# Patient Record
Sex: MALE | Race: WHITE | NOT HISPANIC OR LATINO
[De-identification: names, ages, dates, MRNs, and addresses within clinical notes are randomized per-mention and may not be internally consistent; named-entity substitution may affect disease eponyms.]

---

## 2017-01-13 ENCOUNTER — APPOINTMENT (OUTPATIENT)
Dept: PULMONOLOGY | Facility: CLINIC | Age: 77
End: 2017-01-13

## 2017-01-13 VITALS — HEART RATE: 56 BPM | OXYGEN SATURATION: 98 % | SYSTOLIC BLOOD PRESSURE: 135 MMHG | DIASTOLIC BLOOD PRESSURE: 80 MMHG

## 2017-01-13 VITALS — SYSTOLIC BLOOD PRESSURE: 110 MMHG | DIASTOLIC BLOOD PRESSURE: 80 MMHG

## 2017-07-08 ENCOUNTER — RX RENEWAL (OUTPATIENT)
Age: 77
End: 2017-07-08

## 2017-08-01 ENCOUNTER — RX RENEWAL (OUTPATIENT)
Age: 77
End: 2017-08-01

## 2017-09-05 DIAGNOSIS — L02.32 FURUNCLE OF BUTTOCK: ICD-10-CM

## 2017-09-08 ENCOUNTER — APPOINTMENT (OUTPATIENT)
Dept: PULMONOLOGY | Facility: CLINIC | Age: 77
End: 2017-09-08
Payer: MEDICARE

## 2017-09-08 VITALS
RESPIRATION RATE: 17 BRPM | HEART RATE: 62 BPM | HEIGHT: 68 IN | BODY MASS INDEX: 22.73 KG/M2 | OXYGEN SATURATION: 99 % | WEIGHT: 150 LBS | SYSTOLIC BLOOD PRESSURE: 110 MMHG | DIASTOLIC BLOOD PRESSURE: 80 MMHG

## 2017-09-08 PROCEDURE — 94010 BREATHING CAPACITY TEST: CPT

## 2017-09-08 PROCEDURE — 99214 OFFICE O/P EST MOD 30 MIN: CPT | Mod: 25

## 2017-09-08 RX ORDER — CLINDAMYCIN HYDROCHLORIDE 150 MG/1
150 CAPSULE ORAL EVERY 6 HOURS
Qty: 8 | Refills: 0 | Status: DISCONTINUED | COMMUNITY
Start: 2017-09-05 | End: 2017-09-08

## 2017-09-08 RX ORDER — CLINDAMYCIN HYDROCHLORIDE 300 MG/1
300 CAPSULE ORAL
Qty: 14 | Refills: 0 | Status: DISCONTINUED | COMMUNITY
Start: 2017-05-31

## 2017-09-08 RX ORDER — PHENAZOPYRIDINE HYDROCHLORIDE 200 MG/1
200 TABLET ORAL
Qty: 14 | Refills: 0 | Status: DISCONTINUED | COMMUNITY
Start: 2017-05-31

## 2018-01-01 ENCOUNTER — RX RENEWAL (OUTPATIENT)
Age: 78
End: 2018-01-01

## 2018-03-08 ENCOUNTER — APPOINTMENT (OUTPATIENT)
Dept: PULMONOLOGY | Facility: CLINIC | Age: 78
End: 2018-03-08
Payer: MEDICARE

## 2018-03-08 VITALS
HEART RATE: 68 BPM | WEIGHT: 150 LBS | BODY MASS INDEX: 22.73 KG/M2 | DIASTOLIC BLOOD PRESSURE: 80 MMHG | HEIGHT: 68 IN | SYSTOLIC BLOOD PRESSURE: 120 MMHG | OXYGEN SATURATION: 96 % | RESPIRATION RATE: 17 BRPM

## 2018-03-08 PROCEDURE — 94010 BREATHING CAPACITY TEST: CPT

## 2018-03-08 PROCEDURE — 99214 OFFICE O/P EST MOD 30 MIN: CPT | Mod: 25

## 2018-05-04 ENCOUNTER — RX RENEWAL (OUTPATIENT)
Age: 78
End: 2018-05-04

## 2018-10-25 ENCOUNTER — NON-APPOINTMENT (OUTPATIENT)
Age: 78
End: 2018-10-25

## 2018-10-25 ENCOUNTER — APPOINTMENT (OUTPATIENT)
Dept: PULMONOLOGY | Facility: CLINIC | Age: 78
End: 2018-10-25
Payer: MEDICARE

## 2018-10-25 VITALS
BODY MASS INDEX: 22.73 KG/M2 | OXYGEN SATURATION: 96 % | SYSTOLIC BLOOD PRESSURE: 130 MMHG | HEIGHT: 68 IN | DIASTOLIC BLOOD PRESSURE: 78 MMHG | RESPIRATION RATE: 17 BRPM | WEIGHT: 150 LBS | HEART RATE: 76 BPM

## 2018-10-25 PROCEDURE — 94010 BREATHING CAPACITY TEST: CPT

## 2018-10-25 PROCEDURE — 99214 OFFICE O/P EST MOD 30 MIN: CPT | Mod: 25

## 2018-10-25 NOTE — REVIEW OF SYSTEMS
[Negative] : Sleep Disorder [Ear Disturbance] : ear disturbance [As Noted in HPI] : as noted in HPI [Cough] : cough

## 2018-10-25 NOTE — HISTORY OF PRESENT ILLNESS
[FreeTextEntry1] : Mr. Davenport is a 78 year old male presenting to the office for a follow up visit for LPRD, hoarseness, SOHAIL, postnasal drip and RADs. His chief complaint is a residual cough. \par -He notes he started having chest congestion about 3 weeks ago\par -He states the illness began with "his eyelids sweating" which usually happens before he falls ill\par -He states he was sick with a URI with chills and fever and it lasted a few days, but was suffering with a residual cough\par -He states he has been having ringing in his ear since the illness and it has not resolved\par -He states he has never had the ringing in his ear in the past\par -He notes he has been following up with his GI specialist Dr. Huffman and is now taking Prilosec and Zantac\par -He denies palpitations, shortness of breath, orthopnea, nasal congestion, rhinitis, heartburn, reflux,

## 2018-10-25 NOTE — ADDENDUM
[FreeTextEntry1] : Documented by Concepcion Hernández acting as a scribe for Dr. Turner Sanchez on 10/25/2018.\par \par All medical record entries made by the Scribe were at my, Dr. Turner Sanchez's, direction and personally dictated by me on 10//25/2018. I have reviewed the chart and agree that the record accurately reflects my personal performance of the history, physical exam, assessment and plan. I have also personally directed, reviewed, and agree with the discharge instructions.

## 2018-10-25 NOTE — REASON FOR VISIT
[Follow-Up] : a follow-up visit [FreeTextEntry1] : LPRD, hoarseness, SOHAIL, postnasal drip and RADs

## 2018-10-25 NOTE — PROCEDURE
[FreeTextEntry1] : PFT-spi reveals normal flows with FEV1 of  2.75 ,which is   100% of predicted, normal flow volume loop

## 2018-10-25 NOTE — ASSESSMENT
[FreeTextEntry1] : Mr. Davenport has a history of RADs (flair), allergies, GERD and OSAS.  He is recovering from a URI and currently struggling with tinnitus.\par \par problem 1: RADs/mild intermittent asthma\par -quiet at this point in time\par -no therapy at this point time \par -Asthma is  believed to be caused by inherited (genetic) and environmental factor, but its exact cause is unknown. Asthma may be triggered by allergens, lung infections, or irritants in the air. Asthma triggers are different for each person \par -Inhaler technique reviewed as well as oral hygiene techniques reviewed with patient. Avoidance of cold air, extremes of temperature, rescue inhaler should be used before exercise. Order of medication reviewed with patient. Recommended use of a cool mist humidifier in the bedroom. \par \par problem 2: allergic rhinitis \par -continue to use Qnasl 1 sniff each nostril BID\par -continue to use Claritin OTC\par -Environmental measures for allergies were encouraged including mattress and pillow cover, air purifier, and environmental controls.\par \par problem 3: GERD\par -continue to use Prilosec 20 mg before breakfast, transition to 10mg per day \par -continue to use Zantac 300 mg before bed\par -continue to follow up with Dr. Huffman\par -Things to avoid including overeating, spicy foods, tight clothing, eating within three hours of bed, this list is not all inclusive. \par -For treatment of reflux, possible options discussed including diet control, H2 blockers, PPIs, as well as coating motility agents discussed as treatment options. Timing of meals and proximity of last meal to sleep were discussed. If symptoms persist, a formal gastrointestinal evaluation is needed.\par \par \par problem 4: OSAS\par -he is being recommended to use the CPAP machine  and has been compliant and tolerating it well. \par -he is recommended to use Oxy-Aid by [employer] in the interim\par -Discussed the risks/associations with coronary artery disease, atrial fibrillation, arrhythmia, memory loss, issues with concentration, stroke risk, hypertension, nocturia, chronic reflux/Terrazas’s esophagus some but not all inclusive. Treatment options discussed including CPAP/BiPAP machine, oral appliance, ProVent therapy, Oxy-Aid by [employer], new technologies, or positional sleep.\par \par problem 5: fatigue and use of supplements\par -continue baby aspirin QD\par -continue Co-Q 10 200 mg QD\par -continue L- Carnitine 500 mg BID\par \par problem 6: dysphonia\par -recommended to take Dolce Voce\par \par problem 7: tinnitus\par -recommended to try Quietus\par \par problem 8: health maintenance \par -refused 2018 flu shot \par -recommended strep pneumonia vaccines: Prevnar-13 vaccine (up to date), followed by Pneumo vaccine 23 one year following\par -recommended early intervention for URIs\par -recommended regular osteoporosis evaluations\par -recommended early dermatological evaluations\par -recommended after the age of 50 to the age of 70, colonoscopy every 5 years \par \par \par F/U in 6 months\par He is encouraged to call with any changes, concerns, or questions

## 2018-11-11 ENCOUNTER — EMERGENCY (EMERGENCY)
Facility: HOSPITAL | Age: 78
LOS: 1 days | Discharge: HOME | End: 2018-11-11
Attending: EMERGENCY MEDICINE
Payer: MEDICARE

## 2018-11-11 VITALS
RESPIRATION RATE: 20 BRPM | OXYGEN SATURATION: 98 % | HEIGHT: 68 IN | HEART RATE: 89 BPM | SYSTOLIC BLOOD PRESSURE: 166 MMHG | WEIGHT: 154.98 LBS | DIASTOLIC BLOOD PRESSURE: 95 MMHG | TEMPERATURE: 98 F

## 2018-11-11 PROCEDURE — 99282 EMERGENCY DEPT VISIT SF MDM: CPT

## 2018-11-11 PROCEDURE — 99282 EMERGENCY DEPT VISIT SF MDM: CPT | Mod: GC

## 2018-11-11 NOTE — ED PROVIDER NOTE - MEDICAL DECISION MAKING DETAILS
ddx cerumen impaction vs TM perf. Ear exam shows little cerumen in the L and pt notes the clogged feeling of the L ear has dissipated after the ear exam. Pt to f/u with ENT.

## 2018-11-11 NOTE — ED PROVIDER NOTE - ATTENDING CONTRIBUTION TO CARE
Attending MD Fair: I personally have seen and examined this patient.  Resident note reviewed and agree on plan of care and except where noted.  See below for details.    78M with no reported PMH/PSH presents to the ED with L ear fullness.  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 Dima Bingham.  Reports audiology test was "good" and hearing was "symmetric".  Reports that since that visit has had a marked decreased in tinnitus and now reports it is "almost nothing".  Reports that he came in today because he felt a L ear fullness, reports yesterday felt something "drop" in his ear and thought it was a piece of cerumen left over from his cerumen removal at the time of his ENT visit.  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  Denies fevers, chills, dizziness, weakness. Denies change in vision, headache, neck pain.  Denies facial pain, nasal congestion.  Denies chest pain, shortness of breath, palpitations. Denies abdominal pain, nausea, vomiting, diarrhea, blood in stools. On exam, NAD, CN 2-12 grossly intact, head NCAT, PERRL, TMs clear bilaterally, entire TM visualized, no fluid or blood noted behind the TM, no bulging, minimal cerumen (almost none) bilaterally, FROM at neck, no cervical or auricular LAD, no tenderness to midline palpation, no stepoffs along length of spine, lungs CTAB with good inspiratory effort, +S1S2, no m/r/g, abdomen soft, NT, moving all extremities, ambulating with steady gait; A/P: 78M with L ear fullness, reports felt mildly improved after ear exam here, will follow up with ENT in 24-48hrs. Follow up instructions given, importance of follow up emphasized, return to ED parameters reviewed and patient verbalized understanding.  All questions answered, all concerns addressed.

## 2018-11-11 NOTE — ED PROVIDER NOTE - NS ED ROS FT
ROS: denies HA, weakness, dizziness, fevers/chills, nausea/vomiting, chest pain, SOB, diaphoresis, abdominal pain, diarrhea, joint pain, neuro deficits, dysuria/hematuria, rash    +L ear clogged

## 2018-11-11 NOTE — ED PROVIDER NOTE - PHYSICAL EXAMINATION
Gen: Well appearing, NAD  Head: NCAT  HEENT: PERRL, MMM, normal conjunctiva, anicteric, neck supple  Lung: CTAB, no adventitious sounds  CV: RRR, no murmurs, rubs or gallops  Abd: soft, NTND, no rebound or guarding, no CVAT  MSK: No edema, no visible deformities  Neuro: No focal neurologic deficits. CN II-XII grossly intact. 5/5 strength and normal sensation in all extremities.  Skin: Warm and dry, no evidence of rash  Psych: normal mood and affect

## 2018-11-11 NOTE — ED PROVIDER NOTE - PROGRESS NOTE DETAILS
ear exam - very little cerumen in the L ear, less than the R. Great visualization of TM on both sides. will dc with f/u ENT

## 2018-11-11 NOTE — ED PROVIDER NOTE - OBJECTIVE STATEMENT
77yo M with hx of recurrent clogged L ear presents with L ear clogged. No discomfort but cannot hear as well as he would like to go to the convention tomorrow. denies cp/sob/abd pain/f/c/cough/facial pain.

## 2018-12-01 ENCOUNTER — RX RENEWAL (OUTPATIENT)
Age: 78
End: 2018-12-01

## 2019-01-22 ENCOUNTER — NON-APPOINTMENT (OUTPATIENT)
Age: 79
End: 2019-01-22

## 2019-01-22 ENCOUNTER — APPOINTMENT (OUTPATIENT)
Dept: CARDIOLOGY | Facility: CLINIC | Age: 79
End: 2019-01-22
Payer: MEDICARE

## 2019-01-22 VITALS
DIASTOLIC BLOOD PRESSURE: 85 MMHG | RESPIRATION RATE: 17 BRPM | WEIGHT: 155 LBS | SYSTOLIC BLOOD PRESSURE: 142 MMHG | HEART RATE: 73 BPM | HEIGHT: 68 IN | BODY MASS INDEX: 23.49 KG/M2

## 2019-01-22 PROCEDURE — 93000 ELECTROCARDIOGRAM COMPLETE: CPT

## 2019-01-22 PROCEDURE — 99204 OFFICE O/P NEW MOD 45 MIN: CPT

## 2019-01-28 ENCOUNTER — APPOINTMENT (OUTPATIENT)
Dept: CARDIOLOGY | Facility: CLINIC | Age: 79
End: 2019-01-28
Payer: MEDICARE

## 2019-01-28 VITALS
WEIGHT: 155 LBS | RESPIRATION RATE: 15 BRPM | DIASTOLIC BLOOD PRESSURE: 80 MMHG | HEIGHT: 68 IN | HEART RATE: 68 BPM | BODY MASS INDEX: 23.49 KG/M2 | SYSTOLIC BLOOD PRESSURE: 127 MMHG

## 2019-01-28 PROCEDURE — ZZZZZ: CPT

## 2019-01-28 PROCEDURE — 99213 OFFICE O/P EST LOW 20 MIN: CPT | Mod: 25

## 2019-01-28 PROCEDURE — 93015 CV STRESS TEST SUPVJ I&R: CPT

## 2019-01-28 PROCEDURE — 93306 TTE W/DOPPLER COMPLETE: CPT

## 2019-01-28 NOTE — PHYSICAL EXAM
[General Appearance - Well Developed] : well developed [Normal Appearance] : normal appearance [Well Groomed] : well groomed [General Appearance - Well Nourished] : well nourished [No Deformities] : no deformities [General Appearance - In No Acute Distress] : no acute distress [Normal Conjunctiva] : the conjunctiva exhibited no abnormalities [Normal Oral Mucosa] : normal oral mucosa [Normal Oropharynx] : normal oropharynx [Normal Jugular Venous A Waves Present] : normal jugular venous A waves present [Normal Jugular Venous V Waves Present] : normal jugular venous V waves present [No Jugular Venous Merino A Waves] : no jugular venous merino A waves [Respiration, Rhythm And Depth] : normal respiratory rhythm and effort [Exaggerated Use Of Accessory Muscles For Inspiration] : no accessory muscle use [Auscultation Breath Sounds / Voice Sounds] : lungs were clear to auscultation bilaterally [Bowel Sounds] : normal bowel sounds [Abdomen Soft] : soft [Abnormal Walk] : normal gait [Nail Clubbing] : no clubbing of the fingernails [Cyanosis, Localized] : no localized cyanosis [Skin Color & Pigmentation] : normal skin color and pigmentation [Skin Turgor] : normal skin turgor [] : no rash [Oriented To Time, Place, And Person] : oriented to person, place, and time [Impaired Insight] : insight and judgment were intact [Affect] : the affect was normal [Mood] : the mood was normal [5th Left ICS - MCL] : palpated at the 5th LICS in the midclavicular line [Normal] : normal [No Precordial Heave] : no precordial heave was noted [Normal Rate] : normal [Rhythm Regular] : regular [Normal S1] : normal S1 [Normal S2] : normal S2 [I] : a grade 1 [2+] : left 2+ [No Pitting Edema] : no pitting edema present

## 2019-01-28 NOTE — DISCUSSION/SUMMARY
[FreeTextEntry1] : This is a 78-year-old male with past medical history significant for hypertension, murmur, who comes in for cardiac follow up evaluation. \par The patient had a normal exercise stress test January 28, 2019. He will have an echo Doppler \par examination to evaluate his murmur, left ventricular function, chamber size, and rule out hypertrophy.\par He reports that his palpitations have subsided with his cessation and coffee intake. He will continue to remain off coffee and maintain adequate hydration. He does exercise on a regular basis.\par \par The patient is clear from a cardiac standpoint for colonoscopy pending medical clearance.\par EKG done January 22, 2019 demonstrated normal sinus rhythm rate 73 beats per minute and is otherwise unremarkable. She reports having a normal exercise stress test approximately 6 years ago. I've asked him to get me a copy of those results.\par \par The patient understands that aerobic exercises must be increased to 40 minutes 4 times per week. A detailed discussion of lifestyle modification was done today. The patient has a good understanding of the diagnosis, and treatment plan. Lifestyle modification was also outlined.

## 2019-01-28 NOTE — REASON FOR VISIT
[Follow-Up - Clinic] : a clinic follow-up of [Dyspnea] : dyspnea [Hypertension] : hypertension [Palpitations] : palpitations [FreeTextEntry2] : clearance for colonscopy 01/31/19 [FreeTextEntry1] : murmur

## 2019-01-29 ENCOUNTER — TRANSCRIPTION ENCOUNTER (OUTPATIENT)
Age: 79
End: 2019-01-29

## 2019-02-04 ENCOUNTER — MEDICATION RENEWAL (OUTPATIENT)
Age: 79
End: 2019-02-04

## 2019-04-08 ENCOUNTER — TRANSCRIPTION ENCOUNTER (OUTPATIENT)
Age: 79
End: 2019-04-08

## 2019-04-25 ENCOUNTER — APPOINTMENT (OUTPATIENT)
Dept: PULMONOLOGY | Facility: CLINIC | Age: 79
End: 2019-04-25
Payer: MEDICARE

## 2019-04-25 ENCOUNTER — NON-APPOINTMENT (OUTPATIENT)
Age: 79
End: 2019-04-25

## 2019-04-25 VITALS
HEART RATE: 68 BPM | HEIGHT: 68 IN | WEIGHT: 155 LBS | SYSTOLIC BLOOD PRESSURE: 110 MMHG | BODY MASS INDEX: 23.49 KG/M2 | RESPIRATION RATE: 17 BRPM | OXYGEN SATURATION: 96 % | DIASTOLIC BLOOD PRESSURE: 80 MMHG

## 2019-04-25 PROCEDURE — 94010 BREATHING CAPACITY TEST: CPT

## 2019-04-25 PROCEDURE — 99214 OFFICE O/P EST MOD 30 MIN: CPT | Mod: 25

## 2019-04-25 PROCEDURE — 95012 NITRIC OXIDE EXP GAS DETER: CPT

## 2019-04-25 NOTE — REVIEW OF SYSTEMS
[Negative] : Sleep Disorder [As Noted in HPI] : as noted in HPI [Postnasal Drip] : postnasal drip [FreeTextEntry2] : globus, tinnitus

## 2019-04-25 NOTE — PHYSICAL EXAM
[Well Groomed] : well groomed [General Appearance - Well Developed] : well developed [Normal Appearance] : normal appearance [General Appearance - In No Acute Distress] : no acute distress [General Appearance - Well Nourished] : well nourished [No Deformities] : no deformities [Eyelids - No Xanthelasma] : the eyelids demonstrated no xanthelasmas [Normal Conjunctiva] : the conjunctiva exhibited no abnormalities [II] : II [Normal Oropharynx] : normal oropharynx [Neck Appearance] : the appearance of the neck was normal [Neck Cervical Mass (___cm)] : no neck mass was observed [Jugular Venous Distention Increased] : there was no jugular-venous distention [Thyroid Diffuse Enlargement] : the thyroid was not enlarged [Heart Rate And Rhythm] : heart rate and rhythm were normal [Thyroid Nodule] : there were no palpable thyroid nodules [Heart Sounds] : normal S1 and S2 [Murmurs] : no murmurs present [Respiration, Rhythm And Depth] : normal respiratory rhythm and effort [Exaggerated Use Of Accessory Muscles For Inspiration] : no accessory muscle use [Auscultation Breath Sounds / Voice Sounds] : lungs were clear to auscultation bilaterally [Abdomen Soft] : soft [Abdomen Mass (___ Cm)] : no abdominal mass palpated [Abdomen Tenderness] : non-tender [Gait - Sufficient For Exercise Testing] : the gait was sufficient for exercise testing [Abnormal Walk] : normal gait [Petechial Hemorrhages (___cm)] : no petechial hemorrhages [Nail Clubbing] : no clubbing of the fingernails [Cyanosis, Localized] : no localized cyanosis [Skin Color & Pigmentation] : normal skin color and pigmentation [No Venous Stasis] : no venous stasis [] : no rash [Skin Lesions] : no skin lesions [No Skin Ulcers] : no skin ulcer [No Xanthoma] : no  xanthoma was observed [Sensation] : the sensory exam was normal to light touch and pinprick [Deep Tendon Reflexes (DTR)] : deep tendon reflexes were 2+ and symmetric [No Focal Deficits] : no focal deficits [Affect] : the affect was normal [Impaired Insight] : insight and judgment were intact [Oriented To Time, Place, And Person] : oriented to person, place, and time [FreeTextEntry1] : right lower extremity wound

## 2019-04-25 NOTE — ADDENDUM
[FreeTextEntry1] : Documented by Hossein Carter acting as a scribe for Dr. Turner Sanchez on 04/25/2019 \par \par All medical record entries made by the Scribe were at my, Dr. Turner Sanchez's, direction and personally dictated by me on 04/25/2019  . I have reviewed the chart and agree that the record accurately reflects my personal performance of the history, physical exam, procedure, assessment and plan. I have also personally directed, reviewed, and agree with the discharge instructions. \par \par

## 2019-04-25 NOTE — HISTORY OF PRESENT ILLNESS
[FreeTextEntry1] : Mr. Davenport is a 78 year old male presenting to the office for a follow up visit for LPRD, hoarseness, SOHAIL, postnasal drip and RADs. His chief complaint is post nasal drip / tinnitus \par -he reports he is feeling well generally\par -he notes his energy levels are good overall, rating an 8 out of 10\par -he states he is sleeping well\par -he reports he exercises with a  a few times a week\par -he notes he has a consistent and persistent post nasal drip which is causing a globus sensation\par -he states the tinnitus still persists and is worse upon awaking and improves throughout the day \par -he reports his GERD is controlled with taking Prilosec 20 mg every other day\par -he notes he stopped Omeprazole for a short period of time but his GERD symptoms returned \par -he denies any chest pain, chest pressure, diarrhea, constipation, dysphagia, dizziness, sour taste in the mouth, heartburn, reflux, itchy eyes, itchy ears, leg swelling, leg pain, myalgias or arthralgias\par

## 2019-04-25 NOTE — PROCEDURE
[FreeTextEntry1] : PFT revealed normal flows, with a FEV1 of 2.56  L, which is 94% of predicted, with a normal flow volume loop\par \par FENO was less than 5 ; a normal value being less than 25\par Fractional exhaled nitric oxide (FENO) is regarded as a simple, noninvasive method for assessing eosinophilic airway inflammation. Produced by a variety of cells within the lung, nitric oxide (NO) concentrations are generally low in healthy individuals. However, high concentrations of NO appear to be involved in nonspecific host defense mechanisms and chronic inflammatory diseases such as asthma. The American Thoracic Society (ATS) therefore has recommended using FENO to aid in the diagnosis and monitoring of eosinophilic airway inflammation and asthma, and for identifying steroid responsive individuals whose chronic respiratory symptoms may be caused by airway inflammation. \par  \par Echo (1.28.19) revealed mild to moderate MR. Thickened MV and AV. RVSP was 37.

## 2019-04-25 NOTE — ASSESSMENT
[FreeTextEntry1] : Mr. Davenport has a history of RADs, allergies, GERD and OSAS.  His top issues are mild allergies and tinnitus \par \par problem 1: RADs/mild intermittent asthma\par -quiet at this point in time\par -no therapy at this point time \par -Asthma is  believed to be caused by inherited (genetic) and environmental factor, but its exact cause is unknown. Asthma may be triggered by allergens, lung infections, or irritants in the air. Asthma triggers are different for each person \par -Inhaler technique reviewed as well as oral hygiene techniques reviewed with patient. Avoidance of cold air, extremes of temperature, rescue inhaler should be used before exercise. Order of medication reviewed with patient. Recommended use of a cool mist humidifier in the bedroom. \par \par problem 2: allergic rhinitis \par -continue to use Qnasl or Flonase 1 sniff each nostril BID\par -continue to use Claritin OTC\par -Environmental measures for allergies were encouraged including mattress and pillow cover, air purifier, and environmental controls.\par \par problem 3: GERD\par -continue to use Prilosec 20 mg QOD, transition to 10 mg QD\par -continue to use Zantac 300 mg before bed\par -continue to follow up with Dr. Huffman\par -Things to avoid including overeating, spicy foods, tight clothing, eating within three hours of bed, this list is not all inclusive. \par -For treatment of reflux, possible options discussed including diet control, H2 blockers, PPIs, as well as coating motility agents discussed as treatment options. Timing of meals and proximity of last meal to sleep were discussed. If symptoms persist, a formal gastrointestinal evaluation is needed.\par \par \par problem 4: OSAS\par -he is being recommended to use the CPAP machine  and has been compliant and tolerating it well. \par -he is recommended to use Oxy-Aid by [employer] in the interim\par -Discussed the risks/associations with coronary artery disease, atrial fibrillation, arrhythmia, memory loss, issues with concentration, stroke risk, hypertension, nocturia, chronic reflux/Terrazas’s esophagus some but not all inclusive. Treatment options discussed including CPAP/BiPAP machine, oral appliance, ProVent therapy, Oxy-Aid by [employer], new technologies, or positional sleep.\par \par problem 5: fatigue and use of supplements\par -continue baby aspirin QD\par -continue Co-Q 10 200 mg QD\par -continue L- Carnitine 500 mg BID\par \par problem 6: dysphonia\par -recommended to take Dolce Voce\par \par problem 7: tinnitus\par -recommended to try Quietus\par -recommended Breath-right strips QHS\par \par problem 8: health maintenance \par -recommended Topricin cream for Derm \par -refused 2018 flu shot \par -recommended strep pneumonia vaccines: Prevnar-13 vaccine (up to date), followed by Pneumo vaccine 23 one year following\par -recommended early intervention for URIs\par -recommended regular osteoporosis evaluations\par -recommended early dermatological evaluations\par -recommended after the age of 50 to the age of 70, colonoscopy every 5 years \par \par \par F/U in 6 months\par He is encouraged to call with any changes, concerns, or questions

## 2019-05-06 ENCOUNTER — APPOINTMENT (OUTPATIENT)
Dept: ORTHOPEDIC SURGERY | Facility: CLINIC | Age: 79
End: 2019-05-06
Payer: MEDICARE

## 2019-05-06 VITALS
SYSTOLIC BLOOD PRESSURE: 140 MMHG | DIASTOLIC BLOOD PRESSURE: 84 MMHG | HEART RATE: 69 BPM | WEIGHT: 155 LBS | BODY MASS INDEX: 23.49 KG/M2 | HEIGHT: 68 IN

## 2019-05-06 DIAGNOSIS — Z78.9 OTHER SPECIFIED HEALTH STATUS: ICD-10-CM

## 2019-05-06 DIAGNOSIS — M67.911 UNSPECIFIED DISORDER OF SYNOVIUM AND TENDON, RIGHT SHOULDER: ICD-10-CM

## 2019-05-06 DIAGNOSIS — Z60.2 PROBLEMS RELATED TO LIVING ALONE: ICD-10-CM

## 2019-05-06 PROCEDURE — 99203 OFFICE O/P NEW LOW 30 MIN: CPT

## 2019-05-06 PROCEDURE — 73030 X-RAY EXAM OF SHOULDER: CPT | Mod: 50

## 2019-05-06 SDOH — SOCIAL STABILITY - SOCIAL INSECURITY: PROBLEMS RELATED TO LIVING ALONE: Z60.2

## 2019-05-07 PROBLEM — M67.911 DISORDER OF RIGHT ROTATOR CUFF: Status: ACTIVE | Noted: 2019-05-07

## 2019-05-07 NOTE — DISCUSSION/SUMMARY
[de-identified] : 78-year-old male with bilateral shoulder pain\par \par Patient presents with acute on chronic condition to the right shoulder, also reports some overuse pain to the left shoulder. I discussed that the most likely source of the right shoulder discomfort is the rotator cuff and can be a result of rotator cuff tendon dysfunction (including inflammatory tendonitis vs. internal structural damage), subdeltoid/subacromial bursitis, or impingement of the rotator cuff at the acromion. Other contributing sources of pain may also be the acromioclavicular joint or long head of the biceps tendon. Condition is typically caused by overhead repetitive activity or as a result of minor injury.\par \par The patient's clinical exam is consistent with this diagnosis given weakness on external rotation without significant pain. 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 Nonsurgical treatment is typically first-line therapy that may take weeks to months to resolve symptoms; includes rest from overhead activities, NSAIDs, home exercise program versus physical therapy to restore normal strength/ROM/function of the shoulder, and possible corticosteroid injection. Also discussed role of arthroscopic surgical intervention when nonsurgical treatment does not adequately relieve pain/inflammation.\par \par Recommendations: Conservative modalities as above (overhead activity rest/avoidance, ice, NSAIDs, strengthening and stretching program). Recommend external rotation strengthening and conditioning through his .\par \par Followup: If symptoms persist, potential need for advanced imaging to rule out rotator cuff injury

## 2019-05-07 NOTE — CONSULT LETTER
[Dear  ___] : Dear  [unfilled], [Consult Letter:] : I had the pleasure of evaluating your patient, [unfilled]. [Please see my note below.] : Please see my note below. [Consult Closing:] : Thank you very much for allowing me to participate in the care of this patient.  If you have any questions, please do not hesitate to contact me. [Sincerely,] : Sincerely, [FreeTextEntry3] : Varinder Melton MD\par ______________________________________________\par [facility]: Sports Medicine\par [address], [address], [place] [address]\par (t) [phone]\par (f) [phone]                                                                  \par

## 2019-05-07 NOTE — HISTORY OF PRESENT ILLNESS
[de-identified] : CONSULTATION REPORT\par Referred by Turner Sanchez for evaluation of bilateral shoulder pain; R>L\par \par 78 year old RHD male presents today with bilateral shoulder pain x 2-3  months. He fell off a exercise ball 2 months ago and landed on his right elbow. He has been doing home exercises which has been helpful.  Pain is described to be sharp,  intermittent  brought on when laying on side. Localizes the pain to the posterior aspect of the shoulder at the triceps and posterior joint. Denies significant pain to the left shoulder, reports overuse type discomfort. He is not taking pain medication. Denies numbness or tingling. R>L. \par \par The patient's past medical history, past surgical history, medications and allergies were reviewed by me today with the patient and documented accordingly. In addition, the patient's family and social history, which were noncontributory to this visit, were reviewed also.

## 2019-05-07 NOTE — PHYSICAL EXAM
[de-identified] : Oriented to time, place, person\par Mood: Normal\par Affect: Normal\par Appearance: Healthy, well appearing, no acute distress.\par Gait: Normal\par Assistive Devices: None\par \par Right shoulder exam\par \par Inspection: No malalignment, No defects, No atrophy\par Skin: No masses, No lesions\par Neck: Negative Spurlings, full ROM, no pain with ROM\par AROM: FF to 180, abduction to 90, ER to 70, IR to mid thoracic\par PROM: Full\par Painful arc ROM: None\par Tenderness: No bicipital tenderness, mild tenderness to the greater tuberosity/RTC insertion, no anterior shoulder/lesser tuberosity tenderness\par Strength: 4/5 ER, 5/5 IR in adduction, 5/5 supraspinatus testing, positive O'Briens test\par AC Joint: No ttp/pain with cross arm testing\par Biceps: Speed Negative, Yergusons Negative\par Impingement test: Mild Rebollar, Positive Neer \par Stability: Negative apprehension, negative anterior/posterior load and shift\par Vasc: 2+ radial pulse\par Neuro: AIN, PIN, Ulnar nerve in tact to motor\par Sensation: In tact to light touch throughout\par \par Left shoulder exam\par \par Inspection: No malalignment, No defects, No atrophy\par Skin: No masses, No lesions\par Neck: Negative Spurlings, full ROM, no pain with ROM\par AROM: FF to 180, abduction to 90, ER to 70, IR to mid thoracic\par PROM: Full\par Painful arc ROM: None\par Tenderness: No bicipital tenderness, no tenderness to the greater tuberosity/RTC insertion, no anterior shoulder/lesser tuberosity tenderness\par Strength: 5/5 ER, 5/5 IR in adduction, 5/5 supraspinatus testing, positive O'Briens test\par AC Joint: No ttp/pain with cross arm testing\par Biceps: Speed Negative, Yergusons Negative\par Impingement test: Mild Rebollar, negative Neer \par Stability: Negative apprehension, negative anterior/posterior load and shift\par Vasc: 2+ radial pulse\par Neuro: AIN, PIN, Ulnar nerve in tact to motor\par Sensation: In tact to light touch throughout [de-identified] : \par The following radiographs were ordered and read by me during this patients visit. I reviewed each radiograph in detail with the patient and discussed the findings as highlighted below. \par \par 3 views of the right shoulder were obtained today that show no acute fracture or dislocation. There is no glenohumeral and moderate AC joint degenerative change seen. Type I acromion. There is no significant malalignment. No significant other obvious osseous abnormality, otherwise unremarkable.\par \par 3 views of the left shoulder were obtained today that show no acute fracture or dislocation. There is no glenohumeral and moderate AC joint degenerative change seen. Type I acromion. There is no significant malalignment. No significant other obvious osseous abnormality, otherwise unremarkable.

## 2019-06-10 ENCOUNTER — TRANSCRIPTION ENCOUNTER (OUTPATIENT)
Age: 79
End: 2019-06-10

## 2019-07-29 ENCOUNTER — RX RENEWAL (OUTPATIENT)
Age: 79
End: 2019-07-29

## 2019-08-15 ENCOUNTER — TRANSCRIPTION ENCOUNTER (OUTPATIENT)
Age: 79
End: 2019-08-15

## 2019-09-17 ENCOUNTER — APPOINTMENT (OUTPATIENT)
Dept: CARDIOLOGY | Facility: CLINIC | Age: 79
End: 2019-09-17
Payer: MEDICARE

## 2019-09-17 ENCOUNTER — NON-APPOINTMENT (OUTPATIENT)
Age: 79
End: 2019-09-17

## 2019-09-17 VITALS
DIASTOLIC BLOOD PRESSURE: 82 MMHG | BODY MASS INDEX: 23.34 KG/M2 | HEIGHT: 68 IN | WEIGHT: 154 LBS | RESPIRATION RATE: 16 BRPM | SYSTOLIC BLOOD PRESSURE: 136 MMHG | HEART RATE: 66 BPM

## 2019-09-17 PROCEDURE — 99213 OFFICE O/P EST LOW 20 MIN: CPT

## 2019-09-17 PROCEDURE — 93000 ELECTROCARDIOGRAM COMPLETE: CPT

## 2019-09-18 RX ORDER — BUDESONIDE AND FORMOTEROL FUMARATE DIHYDRATE 160; 4.5 UG/1; UG/1
160-4.5 AEROSOL RESPIRATORY (INHALATION) TWICE DAILY
Qty: 1 | Refills: 3 | Status: DISCONTINUED | COMMUNITY
Start: 2019-06-10 | End: 2019-09-18

## 2019-10-02 PROBLEM — Z60.2 PERSON LIVING ALONE: Status: ACTIVE | Noted: 2019-05-06

## 2019-10-24 ENCOUNTER — NON-APPOINTMENT (OUTPATIENT)
Age: 79
End: 2019-10-24

## 2019-10-24 ENCOUNTER — APPOINTMENT (OUTPATIENT)
Dept: PULMONOLOGY | Facility: CLINIC | Age: 79
End: 2019-10-24
Payer: MEDICARE

## 2019-10-24 VITALS
OXYGEN SATURATION: 96 % | RESPIRATION RATE: 17 BRPM | HEART RATE: 67 BPM | DIASTOLIC BLOOD PRESSURE: 80 MMHG | HEIGHT: 68 IN | WEIGHT: 153 LBS | BODY MASS INDEX: 23.19 KG/M2 | SYSTOLIC BLOOD PRESSURE: 118 MMHG

## 2019-10-24 PROCEDURE — 94010 BREATHING CAPACITY TEST: CPT

## 2019-10-24 PROCEDURE — 95012 NITRIC OXIDE EXP GAS DETER: CPT

## 2019-10-24 PROCEDURE — 99214 OFFICE O/P EST MOD 30 MIN: CPT | Mod: 25

## 2019-10-29 ENCOUNTER — RX CHANGE (OUTPATIENT)
Age: 79
End: 2019-10-29

## 2019-10-30 ENCOUNTER — MEDICATION RENEWAL (OUTPATIENT)
Age: 79
End: 2019-10-30

## 2019-12-05 ENCOUNTER — RX RENEWAL (OUTPATIENT)
Age: 79
End: 2019-12-05

## 2019-12-08 ENCOUNTER — TRANSCRIPTION ENCOUNTER (OUTPATIENT)
Age: 79
End: 2019-12-08

## 2019-12-09 ENCOUNTER — APPOINTMENT (OUTPATIENT)
Dept: ORTHOPEDIC SURGERY | Facility: CLINIC | Age: 79
End: 2019-12-09
Payer: MEDICARE

## 2019-12-09 VITALS
HEIGHT: 68 IN | WEIGHT: 155 LBS | SYSTOLIC BLOOD PRESSURE: 137 MMHG | DIASTOLIC BLOOD PRESSURE: 99 MMHG | BODY MASS INDEX: 23.49 KG/M2 | HEART RATE: 77 BPM

## 2019-12-09 DIAGNOSIS — M65.311 TRIGGER THUMB, RIGHT THUMB: ICD-10-CM

## 2019-12-09 DIAGNOSIS — Z86.79 PERSONAL HISTORY OF OTHER DISEASES OF THE CIRCULATORY SYSTEM: ICD-10-CM

## 2019-12-09 PROCEDURE — 99214 OFFICE O/P EST MOD 30 MIN: CPT | Mod: 25

## 2019-12-09 PROCEDURE — 20550 NJX 1 TENDON SHEATH/LIGAMENT: CPT | Mod: F5

## 2019-12-20 ENCOUNTER — RX RENEWAL (OUTPATIENT)
Age: 79
End: 2019-12-20

## 2019-12-30 ENCOUNTER — APPOINTMENT (OUTPATIENT)
Dept: ORTHOPEDIC SURGERY | Facility: CLINIC | Age: 79
End: 2019-12-30

## 2020-01-01 PROBLEM — M65.311 TRIGGER FINGER OF RIGHT THUMB: Status: ACTIVE | Noted: 2020-01-01

## 2020-01-28 NOTE — PROCEDURE
[FreeTextEntry1] : PFT revealed normal flows, with a FEV1 of 2.48L, which is 92% of predicted, with a normal flow volume loop\par \par FENO was 21; a normal value being less than 25\par Fractional exhaled nitric oxide (FENO) is regarded as a simple, noninvasive method for assessing eosinophilic airway inflammation. Produced by a variety of cells within the lung, nitric oxide (NO) concentrations are generally low in healthy individuals. However, high concentrations of NO appear to be involved in nonspecific host defense mechanisms and chronic inflammatory diseases such as asthma. The American Thoracic Society (ATS) therefore has recommended using FENO to aid in the diagnosis and monitoring of eosinophilic airway inflammation and asthma, and for identifying steroid responsive individuals whose chronic respiratory symptoms may be caused by airway inflammation.

## 2020-01-28 NOTE — ADDENDUM
[FreeTextEntry1] : *******************Amended by Eliseo Balderrama on 1/28/2020*********************\par \par Documented by Eliseo Balderrama acting as a scribe for Dr. Turner Sanchez on 10/24/2019.\par \par All medical record entries made by the Scribe were at my, Dr. Turner Sanchez's, direction and personally dictated by me on 10/24/2019. I have reviewed the chart and agree that the record accurately reflects my personal performance of the history, physical exam, assessment and plan. I have also personally directed, reviewed, and agree with the discharge instructions.

## 2020-01-28 NOTE — PHYSICAL EXAM
[General Appearance - Well Developed] : well developed [Normal Appearance] : normal appearance [Well Groomed] : well groomed [General Appearance - Well Nourished] : well nourished [No Deformities] : no deformities [General Appearance - In No Acute Distress] : no acute distress [Normal Conjunctiva] : the conjunctiva exhibited no abnormalities [Eyelids - No Xanthelasma] : the eyelids demonstrated no xanthelasmas [Normal Oropharynx] : normal oropharynx [Neck Appearance] : the appearance of the neck was normal [Jugular Venous Distention Increased] : there was no jugular-venous distention [Neck Cervical Mass (___cm)] : no neck mass was observed [Heart Rate And Rhythm] : heart rate and rhythm were normal [Thyroid Diffuse Enlargement] : the thyroid was not enlarged [Thyroid Nodule] : there were no palpable thyroid nodules [Heart Sounds] : normal S1 and S2 [Murmurs] : no murmurs present [Respiration, Rhythm And Depth] : normal respiratory rhythm and effort [Auscultation Breath Sounds / Voice Sounds] : lungs were clear to auscultation bilaterally [Exaggerated Use Of Accessory Muscles For Inspiration] : no accessory muscle use [Abdomen Soft] : soft [Abdomen Tenderness] : non-tender [Abdomen Mass (___ Cm)] : no abdominal mass palpated [Abnormal Walk] : normal gait [Gait - Sufficient For Exercise Testing] : the gait was sufficient for exercise testing [Cyanosis, Localized] : no localized cyanosis [Petechial Hemorrhages (___cm)] : no petechial hemorrhages [Nail Clubbing] : no clubbing of the fingernails [Skin Color & Pigmentation] : normal skin color and pigmentation [No Skin Ulcers] : no skin ulcer [Skin Lesions] : no skin lesions [] : no rash [No Venous Stasis] : no venous stasis [No Xanthoma] : no  xanthoma was observed [Deep Tendon Reflexes (DTR)] : deep tendon reflexes were 2+ and symmetric [Sensation] : the sensory exam was normal to light touch and pinprick [Oriented To Time, Place, And Person] : oriented to person, place, and time [No Focal Deficits] : no focal deficits [Impaired Insight] : insight and judgment were intact [Affect] : the affect was normal [III] : III [FreeTextEntry1] : right lower extremity wound

## 2020-01-28 NOTE — REVIEW OF SYSTEMS
[Negative] : Sleep Disorder [As Noted in HPI] : as noted in HPI [Dyspnea] : no dyspnea [Chest Discomfort] : no chest discomfort [Orthopnea] : no orthopnea [Edema] : ~T edema was not present [Constipation] : no constipation [Dysphagia] : no dysphagia [Diarrhea] : no diarrhea [Dizziness] : no dizziness [Difficulty Initiating Sleep] : no difficulty falling asleep [Difficulty Maintaining Sleep] : no difficulty maintaining sleep

## 2020-01-28 NOTE — HISTORY OF PRESENT ILLNESS
[FreeTextEntry1] : Mr. Davenport is a 79 year old male presenting to the office for a follow up visit for LPRD, hoarseness, SOHAIL, postnasal drip and RADs. His chief complaint is aging.\par -he reports feeling generally well\par -he states his energy level is good\par -he reports he exercises with a personal trainers 2x per week\par -he states he is using his CPAP regularly, for about 5-6 hours per night\par -he denies taking any new medications, vitamins, or supplements, except for being taken of some medications\par -he notes his weight is stable\par -he reports having seen Dr. Melton, and gave him exercises for his shoulders, and now his pain has resolved\par -he states his allergies are not active\par -he reports he has reduced his amount of Prilosec, down to 20 mg every other day\par -he states he had an endoscopy\par - Patient is using and benefiting from his CPAP\par -he denies any SOB, orthopnea, chest pain, chest pressure, diarrhea, constipation, dysphagia, dizziness, sour taste in the mouth, leg swelling, leg pain, heartburn, reflux, myalgias or arthralgias.

## 2020-01-28 NOTE — ASSESSMENT
[FreeTextEntry1] : Mr. Davenport has a history of RADs, allergies, GERD and OSAS.  His top issues are mild shoulder issues.\par \par problem 1: RADs/mild intermittent asthma (quiet)\par -quiet at this point in time\par -no therapy at this point time \par -Asthma is  believed to be caused by inherited (genetic) and environmental factor, but its exact cause is unknown. Asthma may be triggered by allergens, lung infections, or irritants in the air. Asthma triggers are different for each person \par -Inhaler technique reviewed as well as oral hygiene techniques reviewed with patient. Avoidance of cold air, extremes of temperature, rescue inhaler should be used before exercise. Order of medication reviewed with patient. Recommended use of a cool mist humidifier in the bedroom. \par \par problem 2: allergic rhinitis \par -continue to use Qnasl or Flonase 1 sniff each nostril BID\par -continue to use Claritin OTC\par -Environmental measures for allergies were encouraged including mattress and pillow cover, air purifier, and environmental controls.\par \par problem 3: GERD\par -continue to use Prilosec 20 mg QOD, transition to 10 mg QD\par -Add Pepcid 40 mg QHS \par -continue to follow up with Dr. Huffman\par -Things to avoid including overeating, spicy foods, tight clothing, eating within three hours of bed, this list is not all inclusive. \par -For treatment of reflux, possible options discussed including diet control, H2 blockers, PPIs, as well as coating motility agents discussed as treatment options. Timing of meals and proximity of last meal to sleep were discussed. If symptoms persist, a formal gastrointestinal evaluation is needed.\par \par \par problem 4: OSAS\par - Patient is using and benefiting from his CPAP\par -he is being recommended to continue use of the CPAP machine  and has been compliant and tolerating it well. \par -he is recommended to use Oxy-Aid by [employer] in the interim\par -Discussed the risks/associations with coronary artery disease, atrial fibrillation, arrhythmia, memory loss, issues with concentration, stroke risk, hypertension, nocturia, chronic reflux/Terrazas’s esophagus some but not all inclusive. Treatment options discussed including CPAP/BiPAP machine, oral appliance, ProVent therapy, Oxy-Aid by [employer], new technologies, or positional sleep.\par \par problem 5: fatigue and use of supplements\par -continue baby aspirin QD\par -continue Co-Q 10 200 mg QD\par -continue L- Carnitine 500 mg BID\par \par problem 6: dysphonia\par -recommended to take Dolce Voce\par \par problem 7: tinnitus\par -recommended to try Quietus\par -recommended Breath-right strips QHS\par \par problem 8: health maintenance \par -recommended Topricin cream for Derm \par -refused 2018 flu shot \par -recommended strep pneumonia vaccines: Prevnar-13 vaccine (up to date), followed by Pneumo vaccine 23 one year following\par -recommended early intervention for URIs\par -recommended regular osteoporosis evaluations\par -recommended early dermatological evaluations\par -recommended after the age of 50 to the age of 70, colonoscopy every 5 years \par \par \par F/U in 6 months\par He is encouraged to call with any changes, concerns, or questions

## 2020-02-12 ENCOUNTER — APPOINTMENT (OUTPATIENT)
Dept: CARDIOLOGY | Facility: CLINIC | Age: 80
End: 2020-02-12
Payer: MEDICARE

## 2020-02-12 VITALS
SYSTOLIC BLOOD PRESSURE: 123 MMHG | DIASTOLIC BLOOD PRESSURE: 82 MMHG | HEART RATE: 70 BPM | BODY MASS INDEX: 23.64 KG/M2 | WEIGHT: 156 LBS | RESPIRATION RATE: 15 BRPM | HEIGHT: 68 IN

## 2020-02-12 PROCEDURE — 93015 CV STRESS TEST SUPVJ I&R: CPT

## 2020-02-12 PROCEDURE — 99213 OFFICE O/P EST LOW 20 MIN: CPT | Mod: 25

## 2020-02-12 RX ORDER — RANITIDINE 300 MG/1
300 TABLET ORAL
Qty: 90 | Refills: 1 | Status: COMPLETED | COMMUNITY
Start: 2019-10-30 | End: 2020-02-12

## 2020-03-18 ENCOUNTER — APPOINTMENT (OUTPATIENT)
Dept: CARDIOLOGY | Facility: CLINIC | Age: 80
End: 2020-03-18
Payer: MEDICARE

## 2020-03-18 PROCEDURE — 93880 EXTRACRANIAL BILAT STUDY: CPT

## 2020-03-18 PROCEDURE — 93306 TTE W/DOPPLER COMPLETE: CPT

## 2020-04-01 ENCOUNTER — TRANSCRIPTION ENCOUNTER (OUTPATIENT)
Age: 80
End: 2020-04-01

## 2020-04-23 ENCOUNTER — APPOINTMENT (OUTPATIENT)
Dept: PULMONOLOGY | Facility: CLINIC | Age: 80
End: 2020-04-23
Payer: MEDICARE

## 2020-04-23 ENCOUNTER — APPOINTMENT (OUTPATIENT)
Dept: ALLERGY | Facility: CLINIC | Age: 80
End: 2020-04-23
Payer: MEDICARE

## 2020-04-23 ENCOUNTER — LABORATORY RESULT (OUTPATIENT)
Age: 80
End: 2020-04-23

## 2020-04-23 VITALS
SYSTOLIC BLOOD PRESSURE: 130 MMHG | HEART RATE: 68 BPM | BODY MASS INDEX: 23.49 KG/M2 | RESPIRATION RATE: 16 BRPM | DIASTOLIC BLOOD PRESSURE: 89 MMHG | WEIGHT: 155 LBS | OXYGEN SATURATION: 95 % | HEIGHT: 68 IN

## 2020-04-23 VITALS
DIASTOLIC BLOOD PRESSURE: 80 MMHG | HEIGHT: 68 IN | RESPIRATION RATE: 17 BRPM | BODY MASS INDEX: 23.49 KG/M2 | OXYGEN SATURATION: 98 % | WEIGHT: 155 LBS | SYSTOLIC BLOOD PRESSURE: 120 MMHG | HEART RATE: 65 BPM | TEMPERATURE: 98.7 F

## 2020-04-23 DIAGNOSIS — L29.9 PRURITUS, UNSPECIFIED: ICD-10-CM

## 2020-04-23 LAB
24R-OH-CALCIDIOL SERPL-MCNC: 31.2 PG/ML
25(OH)D3 SERPL-MCNC: 42.5 NG/ML
A ALTERNATA IGE QN: <0.1 KUA/L
A FUMIGATUS IGE QN: <0.1 KUA/L
BASOPHILS # BLD AUTO: 0.07 K/UL
BASOPHILS NFR BLD AUTO: 1.1 %
C ALBICANS IGE QN: 0.36 KUA/L
C HERBARUM IGE QN: <0.1 KUA/L
CAT DANDER IGE QN: <0.1 KUA/L
CLAM IGE QN: 0.3 KUA/L
CODFISH IGE QN: <0.1 KUA/L
COMMON RAGWEED IGE QN: <0.1 KUA/L
CORN IGE QN: <0.1 KUA/L
COW MILK IGE QN: 0.15 KUA/L
D FARINAE IGE QN: 0.12 KUA/L
D PTERONYSS IGE QN: 0.14 KUA/L
DEPRECATED A ALTERNATA IGE RAST QL: 0
DEPRECATED A FUMIGATUS IGE RAST QL: 0
DEPRECATED C ALBICANS IGE RAST QL: 1
DEPRECATED C HERBARUM IGE RAST QL: 0
DEPRECATED CAT DANDER IGE RAST QL: 0
DEPRECATED CLAM IGE RAST QL: NORMAL
DEPRECATED CODFISH IGE RAST QL: 0
DEPRECATED COMMON RAGWEED IGE RAST QL: 0
DEPRECATED CORN IGE RAST QL: 0
DEPRECATED COW MILK IGE RAST QL: NORMAL
DEPRECATED D FARINAE IGE RAST QL: NORMAL
DEPRECATED D PTERONYSS IGE RAST QL: NORMAL
DEPRECATED DOG DANDER IGE RAST QL: 0
DEPRECATED EGG WHITE IGE RAST QL: 0
DEPRECATED M RACEMOSUS IGE RAST QL: 0
DEPRECATED PEANUT IGE RAST QL: 0
DEPRECATED ROACH IGE RAST QL: 0
DEPRECATED SCALLOP IGE RAST QL: <0.1 KUA/L
DEPRECATED SESAME SEED IGE RAST QL: 0
DEPRECATED SHRIMP IGE RAST QL: 0
DEPRECATED SOYBEAN IGE RAST QL: 0
DEPRECATED TIMOTHY IGE RAST QL: 0
DEPRECATED WALNUT IGE RAST QL: 0
DEPRECATED WHEAT IGE RAST QL: 0
DEPRECATED WHITE OAK IGE RAST QL: 0
DOG DANDER IGE QN: <0.1 KUA/L
EGG WHITE IGE QN: <0.1 KUA/L
EOSINOPHIL # BLD AUTO: 0.19 K/UL
EOSINOPHIL NFR BLD AUTO: 3.1 %
HCT VFR BLD CALC: 48.5 %
HGB BLD-MCNC: 15.7 G/DL
IMM GRANULOCYTES NFR BLD AUTO: 1.1 %
LYMPHOCYTES # BLD AUTO: 1.55 K/UL
LYMPHOCYTES NFR BLD AUTO: 25.1 %
M RACEMOSUS IGE QN: <0.1 KUA/L
MAN DIFF?: NORMAL
MCHC RBC-ENTMCNC: 30.1 PG
MCHC RBC-ENTMCNC: 32.4 GM/DL
MCV RBC AUTO: 92.9 FL
MONOCYTES # BLD AUTO: 0.65 K/UL
MONOCYTES NFR BLD AUTO: 10.5 %
NEUTROPHILS # BLD AUTO: 3.65 K/UL
NEUTROPHILS NFR BLD AUTO: 59.1 %
PEANUT IGE QN: <0.1 KUA/L
PLATELET # BLD AUTO: 220 K/UL
RBC # BLD: 5.22 M/UL
RBC # FLD: 13.6 %
ROACH IGE QN: <0.1 KUA/L
SCALLOP IGE QN: 0
SCALLOP IGE QN: <0.1 KUA/L
SESAME SEED IGE QN: <0.1 KUA/L
SOYBEAN IGE QN: <0.1 KUA/L
TIMOTHY IGE QN: <0.1 KUA/L
TOTAL IGE SMQN RAST: 193 KU/L
WALNUT IGE QN: <0.1 KUA/L
WBC # FLD AUTO: 6.18 K/UL
WHEAT IGE QN: <0.1 KUA/L
WHITE OAK IGE QN: <0.1 KUA/L

## 2020-04-23 PROCEDURE — 99214 OFFICE O/P EST MOD 30 MIN: CPT | Mod: 25

## 2020-04-23 PROCEDURE — 99203 OFFICE O/P NEW LOW 30 MIN: CPT

## 2020-04-23 NOTE — HISTORY OF PRESENT ILLNESS
[FreeTextEntry1] : Mr. Davenport is a 79 year old male presenting to the office for a follow up visit for LPRD, hoarseness, SOHAIL, postnasal drip and RADs. His chief complaint is rash on back. \par - he notes he has had a rash on his back for the last six weeks, he has a sore on his lower right hip. \par - he has been using a cream and prednisone for 5 days. The third day he developed a rash as a result of the prednisone then he completed the 5 days last Saturday night. The rash was cleared in the first two days but then came back the third day. He got prednisone from Urgent care. He has not been to his dermatologist yet since the rash came back. \par - He notes at night he senses a sour taste in his mouth. \par - he notes he was given a 90 day prescription of 40 mg of Pepcid, he does not take 40 though, he takes 80 mg before he goes to bed and every other morning he takes 40 mg of Prilosec\par - he notes occasionally he gets a surface itch on his right eye. \par - He notes hes using the CPAP, he notes the new machine is better than the old. \par - denies taking any new medications, vitamins, or supplements.\par - hes not sure if his detergent or soaps could be affecting his rash. \par - \par -he denies any headaches, nausea, vomiting, fever, chills, sweats, chest pain, chest pressure, diarrhea, constipation, dysphagia, dizziness, leg swelling, leg pain, heartburn, reflux.\par

## 2020-04-23 NOTE — ASSESSMENT
[FreeTextEntry1] : Mr. Davenport has a history of RADs, allergies, GERD and OSAS. He presents to the office today for a follow up.  His top issue is ?dermatologic vs ?allergic \par \par problem 1: RADs/mild intermittent asthma (quiet)\par -quiet at this point in time\par -no therapy at this point time \par -Asthma is  believed to be caused by inherited (genetic) and environmental factor, but its exact cause is unknown. Asthma may be triggered by allergens, lung infections, or irritants in the air. Asthma triggers are different for each person \par -Inhaler technique reviewed as well as oral hygiene techniques reviewed with patient. Avoidance of cold air, extremes of temperature, rescue inhaler should be used before exercise. Order of medication reviewed with patient. Recommended use of a cool mist humidifier in the bedroom. \par \par problem 2: allergic rhinitis \par -continue to use Qnasl or Flonase 1 sniff each nostril BID\par -continue to use Claritin OTC\par -Environmental measures for allergies were encouraged including mattress and pillow cover, air purifier, and environmental controls.\par \par problem 3: GERD\par -continue to use Prilosec 20 mg QOD, transition to 10 mg QD if able \par -continue Pepcid 40 mg QHS \par -continue to follow up with Dr. Huffman\par -Things to avoid including overeating, spicy foods, tight clothing, eating within three hours of bed, this list is not all inclusive. \par -For treatment of reflux, possible options discussed including diet control, H2 blockers, PPIs, as well as coating motility agents discussed as treatment options. Timing of meals and proximity of last meal to sleep were discussed. If symptoms persist, a formal gastrointestinal evaluation is needed.\par \par \par problem 4: OSAS\par - Patient is using and benefiting from his CPAP\par -he is being recommended to continue use of the CPAP machine  and has been compliant and tolerating it well. \par -he is recommended to use Oxy-Aid by [employer] in the interim\par -Discussed the risks/associations with coronary artery disease, atrial fibrillation, arrhythmia, memory loss, issues with concentration, stroke risk, hypertension, nocturia, chronic reflux/Terrazas’s esophagus some but not all inclusive. Treatment options discussed including CPAP/BiPAP machine, oral appliance, ProVent therapy, Oxy-Aid by [employer], new technologies, or positional sleep.\par \par problem 5: fatigue and use of supplements\par -continue baby aspirin QD\par -continue Co-Q 10 200 mg QD\par -continue L- Carnitine 500 mg BID\par \par problem 6: dysphonia\par -recommended to take Dolce Voce\par \par problem 7: tinnitus\par -recommended to try Quietus\par -recommended Breath-right strips QHS\par \par problem 8: health maintenance \par - rash / itch - Allergic profile - Complete blood work to include: IgE level, eosinophil level, vitamin D level, food IgE level, and asthma profile\par - Recommended to see Dermatologist - Dr. Mitch Boxer \par -recommended Topricin cream for Derm \par -refused 2018 flu shot - recommended to get flu shot this year \par -recommended strep pneumonia vaccines: Prevnar-13 vaccine (up to date), followed by Pneumo vaccine 23 one year following\par -recommended early intervention for URIs\par -recommended regular osteoporosis evaluations\par -recommended early dermatological evaluations\par -recommended after the age of 50 to the age of 70, colonoscopy every 5 years \par \par \par F/U in 4 months \par He is encouraged to call with any changes, concerns, or questions

## 2020-04-23 NOTE — ADDENDUM
[FreeTextEntry1] : Documented by Arminda Chang acting as a scribe for Dr. Turner Sanchez on 04/23/2020.\par \par All medical record entries made by the Scribe were at my, Dr. Turner Sanchez's, direction and personally dictated by me on 04/23/2020. I have reviewed the chart and agree that the record accurately reflects my personal performance of the history, physical exam, assessment and plan. I have also personally directed, reviewed, and agree with the discharge instructions.\par

## 2020-04-23 NOTE — PHYSICAL EXAM
[Normal Appearance] : normal appearance [Well Groomed] : well groomed [General Appearance - Well Developed] : well developed [No Deformities] : no deformities [General Appearance - Well Nourished] : well nourished [Normal Conjunctiva] : the conjunctiva exhibited no abnormalities [General Appearance - In No Acute Distress] : no acute distress [Eyelids - No Xanthelasma] : the eyelids demonstrated no xanthelasmas [Normal Oropharynx] : normal oropharynx [Neck Appearance] : the appearance of the neck was normal [Jugular Venous Distention Increased] : there was no jugular-venous distention [Thyroid Diffuse Enlargement] : the thyroid was not enlarged [Neck Cervical Mass (___cm)] : no neck mass was observed [Thyroid Nodule] : there were no palpable thyroid nodules [Heart Rate And Rhythm] : heart rate and rhythm were normal [Heart Sounds] : normal S1 and S2 [Murmurs] : no murmurs present [Respiration, Rhythm And Depth] : normal respiratory rhythm and effort [Exaggerated Use Of Accessory Muscles For Inspiration] : no accessory muscle use [Auscultation Breath Sounds / Voice Sounds] : lungs were clear to auscultation bilaterally [Abdomen Soft] : soft [Abdomen Tenderness] : non-tender [Abdomen Mass (___ Cm)] : no abdominal mass palpated [Abnormal Walk] : normal gait [Gait - Sufficient For Exercise Testing] : the gait was sufficient for exercise testing [Nail Clubbing] : no clubbing of the fingernails [Cyanosis, Localized] : no localized cyanosis [Skin Color & Pigmentation] : normal skin color and pigmentation [Petechial Hemorrhages (___cm)] : no petechial hemorrhages [No Venous Stasis] : no venous stasis [Skin Lesions] : no skin lesions [] : no rash [No Xanthoma] : no  xanthoma was observed [No Skin Ulcers] : no skin ulcer [No Focal Deficits] : no focal deficits [Deep Tendon Reflexes (DTR)] : deep tendon reflexes were 2+ and symmetric [Sensation] : the sensory exam was normal to light touch and pinprick [Oriented To Time, Place, And Person] : oriented to person, place, and time [Impaired Insight] : insight and judgment were intact [Affect] : the affect was normal [II] : II [TextBox_2] : sore on lower right hip [FreeTextEntry1] : right lower extremity wound

## 2020-04-26 ENCOUNTER — RX RENEWAL (OUTPATIENT)
Age: 80
End: 2020-04-26

## 2020-04-27 ENCOUNTER — TRANSCRIPTION ENCOUNTER (OUTPATIENT)
Age: 80
End: 2020-04-27

## 2020-04-27 LAB
ALTERN TENCAPG(M6): 5.7 MCG/ML
ASPER FUMCAPG(M3): 26.3 MCG/ML
AUREOBASCAPG(M12): 3.2 MCG/ML
MICROPOLYCAPG(M22): <2 MCG/ML
PENIC CHRYCAPG(M1): 14.1 MCG/ML
PHOMA BETAE IGG: 6.8 MCG/ML
THERMOCAPG(M23): 27.4 MCG/ML
TRICHODERMA VIRIDE IGG: 2.6 MCG/ML

## 2020-05-04 NOTE — HISTORY OF PRESENT ILLNESS
[Venom Reactions] : venom reactions [Eczematous rashes] : eczematous rashes [Asthma] : asthma [Food Allergies] : food allergies [de-identified] : Patient with rash on back ongoing x 2 months.   He had seen dermatologist for itching of his back and no treatment was given to patient.  He was seen at Urgent Care about 3 weeks ago and he was prescribed triamcinolone and prednisone x 5 days - back improved and he noted a rash on his left upper arm.   The itchy recurred despite prednisone.  He continued to use the topical triamcinolone cream - stopped x 10 days.   This AM he had lab work up from Dr. Sanchez.  \par \par Patient states that itching varies in intensity and moves around.   \par \par Patient switching to famotidine 40 mg x 2 at bedtime.    He also takes omeprazole 20 mg QOD.   Rampril x 11 years and Synthroid x 5 years

## 2020-05-04 NOTE — SOCIAL HISTORY
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

## 2020-05-04 NOTE — PHYSICAL EXAM
[Alert] : alert [Well Nourished] : well nourished [Healthy Appearance] : healthy appearance [No Acute Distress] : no acute distress [Well Developed] : well developed [No Discharge] : no discharge [Sclera Not Icteric] : sclera not icteric [Normal Lips/Tongue] : the lips and tongue were normal [Normal Tonsils] : normal tonsils [No Oral Lesions or Ulcers] : no oral lesions or ulcers [No Neck Mass] : no neck mass was observed [No LAD] : no lymphadenopathy [Normal Rate and Effort] : normal respiratory rhythm and effort [Supple] : the neck was supple [Normal Palpation] : palpation of the chest revealed no abnormalities [No Crackles] : no crackles [Bilateral Audible Breath Sounds] : bilateral audible breath sounds [Normal Rate] : heart rate was normal  [Normal S1, S2] : normal S1 and S2 [No murmur] : no murmur [Regular Rhythm] : with a regular rhythm [Normal Cervical Lymph Nodes] : cervical [No clubbing] : no clubbing [No Joint Swelling or Erythema] : no joint swelling or erythema [No Edema] : no edema [No Cyanosis] : no cyanosis [Normal Mood] : mood was normal [Normal Affect] : affect was normal [Alert, Awake, Oriented as Age-Appropriate] : alert, awake, oriented as age appropriate [de-identified] : mild erythema on back

## 2020-05-04 NOTE — ASSESSMENT
[FreeTextEntry1] : Erythematous patches with pruritus - possible contact dermatitis:\par \par Continue triamcinolone cream BID to TID to back\par Allegra 180 mg QD\par Cerave moisturizer after bathing\par RV prn symptoms.

## 2020-05-04 NOTE — CONSULT LETTER
[Dear  ___] : Dear  [unfilled], [Thank you for referring [unfilled] for consultation for _____] : Thank you for referring [unfilled] for consultation for [unfilled] [Please see my note below.] : Please see my note below. [FreeTextEntry3] : Mitchell B. Boxer, M.D., FAAAAI\par [facility]\par \par Department of Allergy-Immunology\par [facility] \par [address] [place] [address]\par [place] [address]\par Tel:   [phone]\par Fax:  [phone]\par Email: [email or web address]  [Sincerely,] : Sincerely,

## 2020-05-12 ENCOUNTER — APPOINTMENT (OUTPATIENT)
Dept: ALLERGY | Facility: CLINIC | Age: 80
End: 2020-05-12
Payer: MEDICARE

## 2020-05-12 PROCEDURE — 99212 OFFICE O/P EST SF 10 MIN: CPT | Mod: 95

## 2020-05-12 NOTE — ASSESSMENT
[FreeTextEntry1] : Contact allergic reaction has resolved.   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   For his sporadic facial flush he was advised to take Allegra 180 mg QD and to contact me if his symptoms become problematic. \par \par This visit was provided via telehealth using real time 2-way audio visual technology.  The patient, Nitin Davenport, was located at home,at the time of the visit.   The provider, Mitchell Boxer, M.D., was located at the office, [address] [place] [address], at the time of the visit.\par \par The patient, Nitin Davenport and physician, Mitchell Boxer, M.D., participated in the telehealth encounter.\par \par Verbal consent obtained by  from patient.\par \par The majority of time (>50%) was spent on counseling and coordination of care with this patient and/or family member.  The approximate physician face to face time was 10 minutes for the diagnosis of contact pruritus - flushing. \par \par

## 2020-05-12 NOTE — HISTORY OF PRESENT ILLNESS
[Home] : at home, [unfilled] , at the time of the visit. [Medical Office: ([facility])___] : at the medical office located in  [Patient] : the patient [FreeTextEntry2] : Nitin Davenport [Asthma] : asthma [Allergic Rhinitis] : allergic rhinitis [Eczematous rashes] : eczematous rashes [Venom Reactions] : venom reactions [de-identified] : Patient has improved dramatically with triamcinolone cream and Allegra - off medication he noted a slight itch on his back - resolved with Eucerin cream.   He expressed concern about his allergy testing results - minimally reactive ImmunoCAP to mold on allergy testing.   Patient notes an occasional flushed sensation to his face and he expressed concern about etiology.    [Food Allergies] : food allergies

## 2020-05-12 NOTE — REASON FOR VISIT
[Routine Follow-Up] : a routine follow-up visit for [FreeTextEntry3] : patient called desires to review additional lab work by pulmonary MD. and update his rash

## 2020-05-12 NOTE — PHYSICAL EXAM
[Alert] : alert [Well Nourished] : well nourished [Healthy Appearance] : healthy appearance [No Acute Distress] : no acute distress [Well Developed] : well developed [Normal Voice/Communication] : normal voice communication [Wheezing] : no wheezing was heard [Normal Mood] : mood was normal [Normal Affect] : affect was normal [Judgment and Insight Age Appropriate] : judgement and insight is age appropriate [Alert, Awake, Oriented as Age-Appropriate] : alert, awake, oriented as age appropriate [de-identified] : no audible wheeze

## 2020-09-02 ENCOUNTER — APPOINTMENT (OUTPATIENT)
Dept: CARDIOLOGY | Facility: CLINIC | Age: 80
End: 2020-09-02
Payer: MEDICARE

## 2020-09-02 ENCOUNTER — NON-APPOINTMENT (OUTPATIENT)
Age: 80
End: 2020-09-02

## 2020-09-02 VITALS
BODY MASS INDEX: 22.58 KG/M2 | HEART RATE: 62 BPM | SYSTOLIC BLOOD PRESSURE: 118 MMHG | DIASTOLIC BLOOD PRESSURE: 74 MMHG | RESPIRATION RATE: 16 BRPM | WEIGHT: 149 LBS | HEIGHT: 68 IN

## 2020-09-02 PROCEDURE — 99213 OFFICE O/P EST LOW 20 MIN: CPT

## 2020-09-02 PROCEDURE — 93000 ELECTROCARDIOGRAM COMPLETE: CPT

## 2020-09-02 RX ORDER — FAMOTIDINE 40 MG/1
40 TABLET, FILM COATED ORAL TWICE DAILY
Qty: 180 | Refills: 1 | Status: DISCONTINUED | COMMUNITY
Start: 2019-12-20 | End: 2020-09-02

## 2020-09-09 ENCOUNTER — APPOINTMENT (OUTPATIENT)
Dept: PULMONOLOGY | Facility: CLINIC | Age: 80
End: 2020-09-09
Payer: MEDICARE

## 2020-09-09 VITALS
HEART RATE: 69 BPM | OXYGEN SATURATION: 97 % | WEIGHT: 149 LBS | RESPIRATION RATE: 17 BRPM | SYSTOLIC BLOOD PRESSURE: 120 MMHG | HEIGHT: 68 IN | DIASTOLIC BLOOD PRESSURE: 80 MMHG | BODY MASS INDEX: 22.58 KG/M2 | TEMPERATURE: 97.1 F

## 2020-09-09 PROCEDURE — 95012 NITRIC OXIDE EXP GAS DETER: CPT

## 2020-09-09 PROCEDURE — 99214 OFFICE O/P EST MOD 30 MIN: CPT | Mod: 25

## 2020-09-09 NOTE — HISTORY OF PRESENT ILLNESS
[FreeTextEntry1] : Mr. Davenport is a 80 year old male presenting to the office for a follow up visit for LPRD, hoarseness, SOHAIL, postnasal drip and RADs. His chief complaint is \par - he's wonder if he can have other options for treating his acid reflux, he has been taking 80 mg of Pepcid, he reduced it to 60 mg, but he notes it's not controlling it well for him. \par - he notes in the morning he feels like something from his stomach is going up his esophageus\par - periodically he gets flushed, this does not happen at any time of day in particular. It just comes and then goes. \par - he notes a week ago today he saw , he's wondering what his blood test results are. \par - his rash from the last time in he was seen in April, has gone away now with the help of the cream his doctor prescribed him. \par - no wheezing , coughing \par - no interest in flu shot. \par - no SOB \par - he notes he purposely lost about 7 lbs, he has been stretching and exercising \par - He  denies any visual issues, headaches, nausea, vomiting, fever, chills, sweats, chest pains, chest pressure, diarrhea, constipation, dysphagia, myalgia, dizziness, leg swelling, leg pain, itchy eyes, itchy ears, heartburn, reflux, or sour taste in the mouth.

## 2020-09-09 NOTE — ADDENDUM
[FreeTextEntry1] : Documented by Arminda Chang acting as a scribe for Dr. Turner Sanchez on 09/09/2020 \par \par All medical record entries made by the Scribe were at my, Dr. Turner Sanchez's, direction and personally dictated by me on 09/09/2020 . I have reviewed the chart and agree that the record accurately reflects my personal performance of the history, physical exam, assessment and plan. I have also personally directed, reviewed, and agree with the discharge instructions.

## 2020-09-09 NOTE — PHYSICAL EXAM
[General Appearance - Well Developed] : well developed [Normal Appearance] : normal appearance [Well Groomed] : well groomed [No Deformities] : no deformities [General Appearance - Well Nourished] : well nourished [General Appearance - In No Acute Distress] : no acute distress [Normal Conjunctiva] : the conjunctiva exhibited no abnormalities [Normal Oropharynx] : normal oropharynx [Eyelids - No Xanthelasma] : the eyelids demonstrated no xanthelasmas [II] : II [Neck Appearance] : the appearance of the neck was normal [Neck Cervical Mass (___cm)] : no neck mass was observed [Thyroid Diffuse Enlargement] : the thyroid was not enlarged [Jugular Venous Distention Increased] : there was no jugular-venous distention [Murmurs] : no murmurs present [Heart Rate And Rhythm] : heart rate and rhythm were normal [Thyroid Nodule] : there were no palpable thyroid nodules [Heart Sounds] : normal S1 and S2 [Respiration, Rhythm And Depth] : normal respiratory rhythm and effort [Exaggerated Use Of Accessory Muscles For Inspiration] : no accessory muscle use [Abdomen Tenderness] : non-tender [Auscultation Breath Sounds / Voice Sounds] : lungs were clear to auscultation bilaterally [Abdomen Soft] : soft [Abnormal Walk] : normal gait [Gait - Sufficient For Exercise Testing] : the gait was sufficient for exercise testing [Abdomen Mass (___ Cm)] : no abdominal mass palpated [Petechial Hemorrhages (___cm)] : no petechial hemorrhages [Cyanosis, Localized] : no localized cyanosis [Nail Clubbing] : no clubbing of the fingernails [] : no rash [Skin Color & Pigmentation] : normal skin color and pigmentation [Skin Lesions] : no skin lesions [No Xanthoma] : no  xanthoma was observed [No Venous Stasis] : no venous stasis [No Skin Ulcers] : no skin ulcer [Deep Tendon Reflexes (DTR)] : deep tendon reflexes were 2+ and symmetric [Sensation] : the sensory exam was normal to light touch and pinprick [No Focal Deficits] : no focal deficits [Oriented To Time, Place, And Person] : oriented to person, place, and time [Affect] : the affect was normal [Impaired Insight] : insight and judgment were intact [TextBox_2] : sore on lower right hip [FreeTextEntry1] : right lower extremity wound

## 2020-09-09 NOTE — ASSESSMENT
[FreeTextEntry1] : Mr. Davenport is 80 y.o M who has a history of RADs, allergies, GERD and OSAS, BPH.  He presents to the office today for a follow up.  His top issue is ?dermatologic vs ?allergic , + IgE\par \par problem 1: RADs/mild intermittent asthma (quiet)\par -quiet at this point in time\par -no therapy at this point time \par -Asthma is  believed to be caused by inherited (genetic) and environmental factor, but its exact cause is unknown. Asthma may be triggered by allergens, lung infections, or irritants in the air. Asthma triggers are different for each person \par -Inhaler technique reviewed as well as oral hygiene techniques reviewed with patient. Avoidance of cold air, extremes of temperature, rescue inhaler should be used before exercise. Order of medication reviewed with patient. Recommended use of a cool mist humidifier in the bedroom. \par \par Problem 1A: Elevated IgE \par - candidate for Xolair \par -Xolair is a recombinant DNA- derived humanized IgG1K monoclonal antibody that selectively binds ot human immunoglobulin E (IgE). Xolair is produced by a Chinese hamster ovary cell suspension culture in nutrient medium containing the antibiotic gentamicin. Gentamicin is not detectable in the final product. Xolair is a sterile, white, preservative free, lyophilized powder contained in a single use vial that is reconstituted with sterile water for suspension. Side effects include: wheezing, tightness of the chest, trouble breathing, hives, skin rash, feeling anxious or light-headed, fainting, warmth or tingling under skin, or swelling of face, lips, or tongue \par \par problem 2: allergic rhinitis \par -continue to use Qnasl or Flonase 1 sniff each nostril BID\par -continue to use Claritin OTC\par   s/p blood work to include: IgE level (+), eosinophil level (-), vitamin D level (-), food IgE level (+), and asthma profile (+)\par -Environmental measures for allergies were encouraged including mattress and pillow cover, air purifier, and environmental controls.\par \par problem 3: GERD\par -continue to use Prilosec 20 mg QOD, transition to 10 mg QD if able \par -change Pepcid to Tagamet 800 mg (before bed)\par -continue to follow up with Dr. Huffman\par -Things to avoid including overeating, spicy foods, tight clothing, eating within three hours of bed, this list is not all inclusive. \par -For treatment of reflux, possible options discussed including diet control, H2 blockers, PPIs, as well as coating motility agents discussed as treatment options. Timing of meals and proximity of last meal to sleep were discussed. If symptoms persist, a formal gastrointestinal evaluation is needed.\par \par \par problem 4: OSAS\par - Patient is using and benefiting from his CPAP\par -he is being recommended to continue use of the CPAP machine  and has been compliant and tolerating it well. \par -he is recommended to use Oxy-Aid by [employer] in the interim\par -Discussed the risks/associations with coronary artery disease, atrial fibrillation, arrhythmia, memory loss, issues with concentration, stroke risk, hypertension, nocturia, chronic reflux/Terrazas’s esophagus some but not all inclusive. Treatment options discussed including CPAP/BiPAP machine, oral appliance, ProVent therapy, Oxy-Aid by [facility], new technologies, or positional sleep.\par \par problem 5: fatigue and use of supplements\par -continue baby aspirin QD\par -continue Co-Q 10 200 mg QD\par -continue L- Carnitine 500 mg BID\par \par problem 6: dysphonia\par -recommended to take Dolce Voce\par \par problem 7: tinnitus\par -recommended to try Quietus\par -recommended Breath-right strips QHS\par \par Problem 8: Health Maintenance/COVID19 Precautions:\par - Clean your hands often. Wash your hands often with soap and water for at least 20 seconds, especially after blowing your nose, coughing, or sneezing, or having been in a public place.\par - If soap and water are not available, use a hand  that contains at least 60% alcohol.\par - To the extent possible, avoid touching high-touch surfaces in public places - elevator buttons, door handles, handrails, handshaking with people, etc. Use a tissue or your sleeve to cover your hand or finger if you must touch something.\par - Wash your hands after touching surfaces in public places.\par Immune Support Recommendations:\par -OTC Vitamin C 500mg BID \par -OTC Quercetin 250-500mg BID \par -OTC Zinc 75-100mg per day \par -OTC Melatonin 1or 2mg a night \par -OTC Vitamin D 1-4000mg per day \par -OTC Tonic Water 8oz per day\par \par \par problem 9: health maintenance \par - rash / itch - Allergic profile - s/p blood work to include: IgE level, eosinophil level, vitamin D level, food IgE level, and asthma profile\par - Recommended to see Dermatologist - Dr. Mitch Boxer \par -recommended Topricin cream for Derm \par -refused 2018 flu shot - recommended to get flu shot this year \par -recommended strep pneumonia vaccines: Prevnar-13 vaccine (up to date), followed by Pneumo vaccine 23 one year following\par -recommended early intervention for URIs\par -recommended regular osteoporosis evaluations\par -recommended early dermatological evaluations\par -recommended after the age of 50 to the age of 70, colonoscopy every 5 years \par \par \par F/U in 4 months \par He is encouraged to call with any changes, concerns, or questions

## 2020-09-09 NOTE — PROCEDURE
[FreeTextEntry1] :  FENO was 16 ; normal value being less than 25\par Fractional exhaled nitric oxide (FENO) is regarded as a simple, noninvasive method for assessing eosinophilic airway inflammation. Produced by a variety of cells within the lung, nitric oxide (NO) concentrations are generally low in healthy individuals. However, high concentrations of NO appear to be involved in nonspecific host defense mechanisms and chronic inflammatory diseases such as asthma. The American Thor[employer] Society (ATS) therefore has recommended using FENO to aid in the diagnosis and monitoring of eosinophilic airway inflammation and asthma, and for identifying steroid responsive individuals whose chronic respiratory symptoms may be airway inflammation.\par

## 2020-09-11 RX ORDER — FAMOTIDINE 40 MG/1
40 TABLET, FILM COATED ORAL
Qty: 90 | Refills: 1 | Status: DISCONTINUED | COMMUNITY
Start: 2019-10-24 | End: 2020-09-11

## 2020-10-17 ENCOUNTER — RX RENEWAL (OUTPATIENT)
Age: 80
End: 2020-10-17

## 2020-10-19 ENCOUNTER — TRANSCRIPTION ENCOUNTER (OUTPATIENT)
Age: 80
End: 2020-10-19

## 2020-12-15 ENCOUNTER — RX RENEWAL (OUTPATIENT)
Age: 80
End: 2020-12-15

## 2020-12-16 ENCOUNTER — APPOINTMENT (OUTPATIENT)
Dept: CARDIOLOGY | Facility: CLINIC | Age: 80
End: 2020-12-16
Payer: MEDICARE

## 2020-12-16 VITALS
HEART RATE: 88 BPM | SYSTOLIC BLOOD PRESSURE: 124 MMHG | DIASTOLIC BLOOD PRESSURE: 78 MMHG | BODY MASS INDEX: 23.79 KG/M2 | RESPIRATION RATE: 15 BRPM | HEIGHT: 68 IN | WEIGHT: 157 LBS | TEMPERATURE: 97.7 F

## 2020-12-16 PROCEDURE — 99213 OFFICE O/P EST LOW 20 MIN: CPT

## 2020-12-22 ENCOUNTER — APPOINTMENT (OUTPATIENT)
Dept: ORTHOPEDIC SURGERY | Facility: CLINIC | Age: 80
End: 2020-12-22
Payer: MEDICARE

## 2020-12-22 VITALS
DIASTOLIC BLOOD PRESSURE: 93 MMHG | HEART RATE: 79 BPM | HEIGHT: 68 IN | BODY MASS INDEX: 24.1 KG/M2 | WEIGHT: 159 LBS | SYSTOLIC BLOOD PRESSURE: 148 MMHG

## 2020-12-22 VITALS — TEMPERATURE: 97.2 F

## 2020-12-22 DIAGNOSIS — M25.552 PAIN IN RIGHT HIP: ICD-10-CM

## 2020-12-22 DIAGNOSIS — M25.551 PAIN IN RIGHT HIP: ICD-10-CM

## 2020-12-22 PROCEDURE — 73523 X-RAY EXAM HIPS BI 5/> VIEWS: CPT

## 2020-12-22 PROCEDURE — 99214 OFFICE O/P EST MOD 30 MIN: CPT

## 2020-12-22 NOTE — DISCUSSION/SUMMARY
[de-identified] : This patient has mild bilateral hip osteoarthritis.  The patient is not an appropriate candidate for surgical intervention at this time. An extensive discussion was conducted on the natural history of the disease and the variety of surgical and non-surgical options available to the patient including, but not limited to non-steroidal anti-inflammatory medications, steroid injections, physical therapy, maintenance of ideal body weight, and reduction of activity.  He will try a home exercise program as well as take over-the-counter NSAIDs as needed pain.  The patient will schedule an appointment as needed.\par

## 2020-12-22 NOTE — PHYSICAL EXAM
[de-identified] : Patient is well nourished, well-developed, in no acute distress, with appropriate mood and affect. The patient is oriented to time, place, and person. Respirations are even and unlabored. Gait evaluation does not reveal a limp. There is no inguinal adenopathy.  The right limb is well-perfused and showed 2+ dp/pt pulses, without skin lesions, shows a grossly normal motor and sensory examination. Examination of the hip shows no skin lesions. Hip motion is full and painless from 0-90 degrees extension to flexion, 20 degrees adduction and 20 degrees abduction, and 15 degrees internal and 30 degrees external rotation. FADIR is mildly positive and IFRAH is negative. Stinchfield test is negative. The left limb is well-perfused and showed 2+ dp/pt pulses, without skin lesions, shows a grossly normal motor and sensory examination. Examination of the hip shows no skin lesions. Hip motion is full and painless from 0-90 degrees extension to flexion, 20 degrees adduction and 20 degrees abduction, and 15 degrees internal and 30 degrees external rotation. FADIR is positive and IFRAH is positive. Stinchfield test is positive.  Leg lengths are approximately equal. Both hips are stable and muscle strength is normal with good strength with resisted abduction and adduction. Pedal pulses are palpable. [de-identified] : AP and lateral x-rays of the bilateral hip, pelvis, and femur were ordered and taken in the office and demonstrate mild degenerative joint disease of the hip with joint space narrowing, osteophyte formation, and subchondral sclerosis.\par

## 2020-12-22 NOTE — HISTORY OF PRESENT ILLNESS
[de-identified] : This is very nice 80-year-old gentleman experiencing bilateral hip and groin and thigh pain, which is mild in intensity.  The activities are not limited of daily living.  He does only have pain when he aggressively stretches and works out his muscles around the hips.  Walking tolerance is not limited.  Does not take any medications for the pain.  Stretching helps.  Does his own home exercise program and does not do formal physical therapy.  Does not walk with a cane or walker.  The patient denies any radiation of the pain to the feet and it is not associated with numbness, tingling, or weakness.

## 2021-01-01 ENCOUNTER — TRANSCRIPTION ENCOUNTER (OUTPATIENT)
Age: 81
End: 2021-01-01

## 2021-02-02 ENCOUNTER — LABORATORY RESULT (OUTPATIENT)
Age: 81
End: 2021-02-02

## 2021-02-05 ENCOUNTER — APPOINTMENT (OUTPATIENT)
Dept: PULMONOLOGY | Facility: CLINIC | Age: 81
End: 2021-02-05
Payer: MEDICARE

## 2021-02-05 ENCOUNTER — NON-APPOINTMENT (OUTPATIENT)
Age: 81
End: 2021-02-05

## 2021-02-05 VITALS
TEMPERATURE: 97.2 F | HEART RATE: 71 BPM | WEIGHT: 155 LBS | HEIGHT: 68 IN | SYSTOLIC BLOOD PRESSURE: 110 MMHG | OXYGEN SATURATION: 98 % | BODY MASS INDEX: 23.49 KG/M2 | RESPIRATION RATE: 15 BRPM | DIASTOLIC BLOOD PRESSURE: 70 MMHG

## 2021-02-05 PROCEDURE — 95012 NITRIC OXIDE EXP GAS DETER: CPT

## 2021-02-05 PROCEDURE — 94010 BREATHING CAPACITY TEST: CPT

## 2021-02-05 PROCEDURE — 99214 OFFICE O/P EST MOD 30 MIN: CPT | Mod: 25

## 2021-02-05 NOTE — HISTORY OF PRESENT ILLNESS
[FreeTextEntry1] : Mr. Davenport is a 80 year old male presenting to the office for a follow up visit for LPRD, hoarseness, SOHAIL, postnasal drip and RADs. His chief complaint is \par -He notes feeling SOB 2 months ago, which lasted 1 month and has resolved since \par - He notes having issues since Zantac got off the market, but then feeling good 2 weeks when being off of it \par -He notes his weight is stable \par -He notes exercising, working out with a  3x per week \par -He notes taking medication that has Tumeric for a week and feeling good in terms of his muscle aches \par -He note an appointment with Dr. Melton next week \par -He notes having shoulder issues, but is getting better after taking Tumeric \par -He notes his reflux is controlled\par -He notes sour taste in the mouth occasionally \par -He notes now taking Tumeric and Quercitin, otherwise no new medications, vitamins or supplements \par \par - denies any headaches, nausea, vomiting, fever, chills, sweats, chest pain, chest pressure, diarrhea, constipation, dysphagia, dizziness, leg swelling, leg pain, itchy eyes, itchy ears, heartburn, reflux

## 2021-02-05 NOTE — PHYSICAL EXAM
[General Appearance - Well Developed] : well developed [Normal Appearance] : normal appearance [Well Groomed] : well groomed [General Appearance - Well Nourished] : well nourished [No Deformities] : no deformities [General Appearance - In No Acute Distress] : no acute distress [Normal Conjunctiva] : the conjunctiva exhibited no abnormalities [Eyelids - No Xanthelasma] : the eyelids demonstrated no xanthelasmas [Normal Oropharynx] : normal oropharynx [Neck Appearance] : the appearance of the neck was normal [Neck Cervical Mass (___cm)] : no neck mass was observed [Jugular Venous Distention Increased] : there was no jugular-venous distention [Thyroid Diffuse Enlargement] : the thyroid was not enlarged [Thyroid Nodule] : there were no palpable thyroid nodules [Heart Rate And Rhythm] : heart rate and rhythm were normal [Heart Sounds] : normal S1 and S2 [Murmurs] : no murmurs present [Respiration, Rhythm And Depth] : normal respiratory rhythm and effort [Exaggerated Use Of Accessory Muscles For Inspiration] : no accessory muscle use [Auscultation Breath Sounds / Voice Sounds] : lungs were clear to auscultation bilaterally [Abdomen Soft] : soft [Abdomen Tenderness] : non-tender [Abdomen Mass (___ Cm)] : no abdominal mass palpated [Abnormal Walk] : normal gait [Gait - Sufficient For Exercise Testing] : the gait was sufficient for exercise testing [Nail Clubbing] : no clubbing of the fingernails [Cyanosis, Localized] : no localized cyanosis [Petechial Hemorrhages (___cm)] : no petechial hemorrhages [Skin Color & Pigmentation] : normal skin color and pigmentation [] : no rash [No Venous Stasis] : no venous stasis [Skin Lesions] : no skin lesions [No Skin Ulcers] : no skin ulcer [No Xanthoma] : no  xanthoma was observed [Deep Tendon Reflexes (DTR)] : deep tendon reflexes were 2+ and symmetric [Sensation] : the sensory exam was normal to light touch and pinprick [No Focal Deficits] : no focal deficits [Oriented To Time, Place, And Person] : oriented to person, place, and time [Impaired Insight] : insight and judgment were intact [Affect] : the affect was normal [III] : III [TextBox_2] : sore on lower right hip [FreeTextEntry1] : right lower extremity wound

## 2021-02-05 NOTE — ASSESSMENT
[FreeTextEntry1] : Mr. Davenport is 80 y.o M who has a history of RADs, allergies, GERD and OSAS, BPH, (+) IgE, Asthma.  He presents to the office today for a follow up.  His top issue is orthopedic \par \par problem 1: RADs/mild intermittent asthma (quiet)\par -quiet at this point in time\par -no therapy at this point time \par -Asthma is  believed to be caused by inherited (genetic) and environmental factor, but its exact cause is unknown. Asthma may be triggered by allergens, lung infections, or irritants in the air. Asthma triggers are different for each person \par -Inhaler technique reviewed as well as oral hygiene techniques reviewed with patient. Avoidance of cold air, extremes of temperature, rescue inhaler should be used before exercise. Order of medication reviewed with patient. Recommended use of a cool mist humidifier in the bedroom. \par \par Problem 1A: Elevated IgE \par - candidate for Xolair \par -Xolair is a recombinant DNA- derived humanized IgG1K monoclonal antibody that selectively binds ot human immunoglobulin E (IgE). Xolair is produced by a Chinese hamster ovary cell suspension culture in nutrient medium containing the antibiotic gentamicin. Gentamicin is not detectable in the final product. Xolair is a sterile, white, preservative free, lyophilized powder contained in a single use vial that is reconstituted with sterile water for suspension. Side effects include: wheezing, tightness of the chest, trouble breathing, hives, skin rash, feeling anxious or light-headed, fainting, warmth or tingling under skin, or swelling of face, lips, or tongue \par \par problem 2: allergic rhinitis \par -continue to use Qnasl or Flonase 1 sniff each nostril BID\par -continue to use Claritin OTC\par   s/p blood work to include: IgE level (+), eosinophil level (-), vitamin D level (-), food IgE level (+), and asthma profile (+)\par -Environmental measures for allergies were encouraged including mattress and pillow cover, air purifier, and environmental controls.\par \par problem 3: GERD\par -continue to use Prilosec 20 mg QOD, transition to 10 mg QD if able \par -change Pepcid to Tagamet 800 mg (before bed)\par -continue to follow up with Dr. Huffman\par -Things to avoid including overeating, spicy foods, tight clothing, eating within three hours of bed, this list is not all inclusive. \par -For treatment of reflux, possible options discussed including diet control, H2 blockers, PPIs, as well as coating motility agents discussed as treatment options. Timing of meals and proximity of last meal to sleep were discussed. If symptoms persist, a formal gastrointestinal evaluation is needed.\par \par \par problem 4: OSAS\par - Patient is using and benefiting from his CPAP\par -he is being recommended to continue use of the CPAP machine  and has been compliant and tolerating it well. \par -he is recommended to use Oxy-Aid by [facility] in the interim\par -Discussed the risks/associations with coronary artery disease, atrial fibrillation, arrhythmia, memory loss, issues with concentration, stroke risk, hypertension, nocturia, chronic reflux/Terrazas’s esophagus some but not all inclusive. Treatment options discussed including CPAP/BiPAP machine, oral appliance, ProVent therapy, Oxy-Aid by [facility], new technologies, or positional sleep.\par \par problem 5: fatigue and use of supplements\par -continue baby aspirin QD\par -continue Co-Q 10 200 mg QD\par -continue L- Carnitine 500 mg BID\par \par problem 6: dysphonia\par -recommended to take Dolce Voce\par \par problem 7: tinnitus\par -recommended to try Quietus\par -recommended Breath-right strips QHS\par \par Problem 8: Health Maintenance/COVID19 Precautions:\par -Recommended Supplements:\par -SPM\par -Tumeric\par -Topricin\par \par \par - Clean your hands often. Wash your hands often with soap and water for at least 20 seconds, especially after blowing your nose, coughing, or sneezing, or having been in a public place.\par - If soap and water are not available, use a hand  that contains at least 60% alcohol.\par - To the extent possible, avoid touching high-touch surfaces in public places - elevator buttons, door handles, handrails, handshaking with people, etc. Use a tissue or your sleeve to cover your hand or finger if you must touch something.\par - Wash your hands after touching surfaces in public places.\par Immune Support Recommendations:\par -OTC Vitamin C 500mg BID \par -OTC Quercetin 250-500mg BID \par -OTC Zinc 75-100mg per day \par -OTC Melatonin 1or 2mg a night \par -OTC Vitamin D 1-4000mg per day \par -OTC Tonic Water 8oz per day\par \par \par problem 9: health maintenance \par - rash / itch - Allergic profile - s/p blood work to include: IgE level, eosinophil level, vitamin D level, food IgE level, and asthma profile\par - Recommended to see Dermatologist - Dr. Mitch Boxer \par -recommended Topricin cream for Derm \par -refused 2018 flu shot - recommended to get flu shot this year \par -recommended strep pneumonia vaccines: Prevnar-13 vaccine (up to date), followed by Pneumo vaccine 23 one year following\par -recommended early intervention for URIs\par -recommended regular osteoporosis evaluations\par -recommended early dermatological evaluations\par -recommended after the age of 50 to the age of 70, colonoscopy every 5 years \par \par \par F/U in 4 months \par He is encouraged to call with any changes, concerns, or questions

## 2021-02-05 NOTE — PROCEDURE
[FreeTextEntry1] : FENO was 9; a normal value being less than 25\par Fractional exhaled nitric oxide (FENO) is regarded as a simple, noninvasive method for assessing eosinophilic airway inflammation. Produced by a variety of cells within the lung, nitric oxide (NO) concentrations are generally low in healthy individuals. However, high concentrations of NO appear to be involved in nonspecific host defense mechanisms and chronic inflammatory diseases such as asthma. The American Thoracic Society (ATS) therefore has recommended using FENO to aid in the diagnosis and monitoring of eosinophilic airway inflammation and asthma, and for identifying steroid responsive individuals whose chronic respiratory symptoms may be caused by airway inflammation. \par \par PFT revealed normal flows, with a FEV1 of 2.7L, which is 102% of predicted, with a normal flow volume loop

## 2021-02-05 NOTE — ADDENDUM
[FreeTextEntry1] : Documented by Abner Vega acting as a scribe for Dr. Turner Sanchez on (02/05/2021).\par \par All medical record entries made by the Scribe were at my, Dr. Turner Sanchez's, direction and personally dictated by me on (01/21/2021). I have reviewed the chart and agree that the record accurately reflects my personal performance of the history, physical exam, assessment and plan. I have also personally directed, reviewed, and agree with the discharge instructions. \par

## 2021-02-08 ENCOUNTER — APPOINTMENT (OUTPATIENT)
Dept: ORTHOPEDIC SURGERY | Facility: CLINIC | Age: 81
End: 2021-02-08
Payer: MEDICARE

## 2021-02-08 DIAGNOSIS — M25.511 PAIN IN RIGHT SHOULDER: ICD-10-CM

## 2021-02-08 DIAGNOSIS — M25.512 PAIN IN RIGHT SHOULDER: ICD-10-CM

## 2021-02-08 PROCEDURE — 73030 X-RAY EXAM OF SHOULDER: CPT | Mod: LT

## 2021-02-08 PROCEDURE — 99213 OFFICE O/P EST LOW 20 MIN: CPT

## 2021-02-08 NOTE — PHYSICAL EXAM
[de-identified] : Oriented to time, place, person\par Mood: Normal\par Affect: Normal\par Appearance: Healthy, well appearing, no acute distress.\par Gait: Normal\par Assistive Devices: None\par \par Left shoulder exam:\par \par Inspection: No malalignment, No defects, No atrophy\par Skin: No masses, No lesions\par Neck: Negative Spurling, full ROM, no pain with ROM\par AROM: FF to 180, abduction to 90, ER to 70, IR to mid lumbar\par Painful arc ROM: abduction, FF\par Tenderness: No bicipital tenderness, no tenderness to greater tuberosity/RTC insertion, no anterior shoulder/lesser tuberosity tenderness\par Strength: 5/5 ER, 5/5 IR in adduction, 4-/5 supraspinatus testing, negative Tampa's test\par AC joint: No TTP/pain with cross arm testing\par Biceps: Speed Negative, Yergason Negative \par Impingement test: + Rebollar, Negative Neer\par Vasc: 2+ radial pulse \par Stability: Stable \par Neuro: AIN, PIN, Ulnar nerve intact to motor\par Sensation: Intact to light touch throughout  [de-identified] : The following radiographs were ordered and read by me during this patients visit. I reviewed each radiograph in detail with the patient and discussed the findings as highlighted below.\par \par 3 views of left shoulder were obtained today, 02/08/2021, that show no acute fracture or dislocation. There is trace glenohumeral and moderate AC joint degenerative change seen. Type II acromion. There is no significant malalignment. No significant other obvious osseous abnormality, otherwise unremarkable.

## 2021-02-08 NOTE — HISTORY OF PRESENT ILLNESS
[de-identified] : 80 year old RHD male seen before for bilateral shoulder pain presents today with left shoulder pain x 2 months. His shoulder started hurting after stretching at the gym. Acupuncture treatment has helped significantly with the pain. The pain is brought on with  forward flexion. Advil is not helpful.  Bengay and massaging was not helpful. Reports radiation of pain in to his elbow. Denies numbness or tingling. Patient goes to the gym often and weight lifts. Dr. Larose note reviewed from 12.22.20.

## 2021-02-08 NOTE — ADDENDUM
[FreeTextEntry1] : This note was written by Donna Sweeney on 02/08/2021 acting solely as a scribe for Dr. Varinder Melton.\par \par All medical record entries made by the Scribe were at my, Dr. Varinder Melton, direction and personally dictated by me on 02/08/2021. I have personally reviewed the chart and agree that the record accurately reflects my personal performance of the history, physical exam, assessment and plan.

## 2021-02-08 NOTE — DISCUSSION/SUMMARY
[de-identified] : 79 y/o male with left shoulder pain. \par \par Clinically, patient is weak in forward flexion test that may be consistent with rotator cuff dysfunction.  Patient reports an event where he developed pain within the shoulder that may be consistent with mild injury.  I also discussed his age, and that he may have some pre-existing rotator cuff dysfunction.\par \par Discussed short-term and long-term outcomes as well as the goal of treatment to reduce pain and restore function. Nonsurgical treatment is typically first-line therapy that may take weeks to months to resolve symptoms; includes rest from overhead activities, NSAIDs, home exercise program versus physical therapy to restore normal strength/ROM/function of the shoulder, and possible corticosteroid injection. Also discussed the role of arthroscopic surgical intervention when nonsurgical treatment does not adequately relieve pain/inflammation. \par \par Recommendations: HEP given. Conservative modalities as above (overhead activity rest/activity avoidance until less symptomatic, ice, NSAIDs, home exercise strengthening and stretching program). \par \par Followup: If symptoms progress or persist for additional treatment as needed

## 2021-02-24 ENCOUNTER — RX RENEWAL (OUTPATIENT)
Age: 81
End: 2021-02-24

## 2021-04-21 ENCOUNTER — NON-APPOINTMENT (OUTPATIENT)
Age: 81
End: 2021-04-21

## 2021-04-21 ENCOUNTER — APPOINTMENT (OUTPATIENT)
Dept: CARDIOLOGY | Facility: CLINIC | Age: 81
End: 2021-04-21
Payer: MEDICARE

## 2021-04-21 VITALS
HEIGHT: 68 IN | OXYGEN SATURATION: 98 % | BODY MASS INDEX: 23.64 KG/M2 | WEIGHT: 156 LBS | RESPIRATION RATE: 16 BRPM | HEART RATE: 61 BPM | DIASTOLIC BLOOD PRESSURE: 80 MMHG | TEMPERATURE: 98 F | SYSTOLIC BLOOD PRESSURE: 128 MMHG

## 2021-04-21 PROCEDURE — 99214 OFFICE O/P EST MOD 30 MIN: CPT

## 2021-04-21 PROCEDURE — 93000 ELECTROCARDIOGRAM COMPLETE: CPT

## 2021-04-21 NOTE — ASSESSMENT
[FreeTextEntry1] : Pt is a 80-year-old male with PMHx significant for hypertension, murmur, who comes in for cardiac follow up evaluation. Pt is well at the current time. Pt denies chest pain, palpitations, SOB, DEYR, dizziness, and syncope. Pt is compliant with all medications.

## 2021-04-21 NOTE — DISCUSSION/SUMMARY
[FreeTextEntry1] : This is a 80-year-old male with past medical history significant for mitral valve prolapse with moderate mitral valve regurgitation, hypertension, murmur, dyspepsia, reactive airway disease, who comes in for cardiac follow up evaluation.  The patient denies chest pain, palpitations, dizziness or syncope.\par The patient remained stable from a cardiac standpoint.\par Electrocardiogram done April 21, 2021 demonstrated normal sinus rhythm rate 60 bpm is otherwise unremarkable.\par The patient had blood work done March 13, 2021 which demonstrated cholesterol 160, HDL of 56, triglycerides of 37, LDL calculated of 97 mg/dL, hemoglobin A1c of 5.6.\par The patient had an echo Doppler examination done March 18, 2020 which demonstrated bileaflet mitral valve prolapse and moderate mitral valve regurgitation, thickened aortic valve leaflets with mild tricuspid and mild pulmonic valve regurgitation.  There was satisfactory left ventricular function with an ejection fraction 64%.\par Patient will have new blood work done with his primary care physician.  He is currently hemodynamically stable and asymptomatic from a cardiac standpoint.\par He had a normal exercise stress test March 18, 2020.\par \par The patient understands that aerobic exercises must be increased to 40 minutes 4 times per week. A detailed discussion of lifestyle modification was done today. The patient has a good understanding of the diagnosis, and treatment plan. Lifestyle modification was also outlined.

## 2021-04-21 NOTE — REASON FOR VISIT
[Follow-Up - Clinic] : a clinic follow-up of [Dyspnea] : dyspnea [Hypertension] : hypertension [Palpitations] : palpitations [FreeTextEntry1] : This is a 80-year-old male with past medical history significant for hypertension, murmur, dyspepsia, reactive airway disease, who comes in for cardiac follow up evaluation.  The patient denies chest pain, palpitations, dizziness or syncope.\par \par pmh\par He had one episode of shortness of breath in the morning while waking up which was transient in nature.  He had another episode of "shortness of breath" after having a hot cup of coffee.  He has no exertional symptoms.  These 2 episodes are suggestive of dyspepsia/GERD.  I have instructed the patient to contact his gastroenterologist regarding this issue.\par He had a normal exercise stress test March 18, 2020.

## 2021-06-06 LAB — SARS-COV-2 N GENE NPH QL NAA+PROBE: NOT DETECTED

## 2021-06-08 ENCOUNTER — APPOINTMENT (OUTPATIENT)
Dept: PULMONOLOGY | Facility: CLINIC | Age: 81
End: 2021-06-08
Payer: MEDICARE

## 2021-06-08 ENCOUNTER — NON-APPOINTMENT (OUTPATIENT)
Age: 81
End: 2021-06-08

## 2021-06-08 VITALS
WEIGHT: 156 LBS | HEIGHT: 68 IN | TEMPERATURE: 97.5 F | HEART RATE: 43 BPM | OXYGEN SATURATION: 98 % | RESPIRATION RATE: 16 BRPM | DIASTOLIC BLOOD PRESSURE: 78 MMHG | BODY MASS INDEX: 23.64 KG/M2 | SYSTOLIC BLOOD PRESSURE: 118 MMHG

## 2021-06-08 PROCEDURE — 99214 OFFICE O/P EST MOD 30 MIN: CPT | Mod: 25

## 2021-06-08 PROCEDURE — 94010 BREATHING CAPACITY TEST: CPT

## 2021-06-08 PROCEDURE — 95012 NITRIC OXIDE EXP GAS DETER: CPT

## 2021-06-08 NOTE — ADDENDUM
[FreeTextEntry1] : Documented by Abner Vega acting as a scribe for Dr. Turner Sanchez on (06/08/2021).\par \par All medical record entries made by the Scribe were at my, Dr. Turner Sanchez's, direction and personally dictated by me on (06/08/2021). I have reviewed the chart and agree that the record accurately reflects my personal performance of the history, physical exam, assessment and plan. I have also personally directed, reviewed, and agree with the discharge instructions.\par

## 2021-06-08 NOTE — ASSESSMENT
[FreeTextEntry1] : Mr. Davenport is 80 y.o M who has a history of RADs, allergies, GERD and OSAS, BPH, (+) IgE, Asthma.  He presents to the office today for a follow up.  His top issue is throat issues\par \par problem 1: RADs/mild intermittent asthma (quiet)\par -quiet at this point in time\par -no therapy at this point time \par -Asthma is  believed to be caused by inherited (genetic) and environmental factor, but its exact cause is unknown. Asthma may be triggered by allergens, lung infections, or irritants in the air. Asthma triggers are different for each person \par -Inhaler technique reviewed as well as oral hygiene techniques reviewed with patient. Avoidance of cold air, extremes of temperature, rescue inhaler should be used before exercise. Order of medication reviewed with patient. Recommended use of a cool mist humidifier in the bedroom. \par \par Problem 1A: Elevated IgE \par - candidate for Xolair \par -Xolair is a recombinant DNA- derived humanized IgG1K monoclonal antibody that selectively binds ot human immunoglobulin E (IgE). Xolair is produced by a Chinese hamster ovary cell suspension culture in nutrient medium containing the antibiotic gentamicin. Gentamicin is not detectable in the final product. Xolair is a sterile, white, preservative free, lyophilized powder contained in a single use vial that is reconstituted with sterile water for suspension. Side effects include: wheezing, tightness of the chest, trouble breathing, hives, skin rash, feeling anxious or light-headed, fainting, warmth or tingling under skin, or swelling of face, lips, or tongue \par \par problem 2: allergic rhinitis \par -continue to use Qnasl or Flonase 1 sniff each nostril BID\par -continue to use Claritin OTC\par -s/p blood work to include: IgE level (+), eosinophil level (-), vitamin D level (-), food IgE level (+), and asthma profile (+)\par -Environmental measures for allergies were encouraged including mattress and pillow cover, air purifier, and environmental controls.\par \par problem 3: GERD\par -continue to use Prilosec 20 mg QOD, transition to 10 mg QD if able \par - continue Pepcid AC 60 mg QHS\par  -Add DGL pre-meal qHS \par -Recommend Alkalol Gargle \par -continue to follow up with Dr. Huffman\par -Things to avoid including overeating, spicy foods, tight clothing, eating within three hours of bed, this list is not all inclusive. \par -For treatment of reflux, possible options discussed including diet control, H2 blockers, PPIs, as well as coating motility agents discussed as treatment options. Timing of meals and proximity of last meal to sleep were discussed. If symptoms persist, a formal gastrointestinal evaluation is needed.\par \par \par problem 4: OSAS\par - Patient is using and benefiting from his CPAP\par -he is being recommended to continue use of the CPAP machine  and has been compliant and tolerating it well. \par -he is recommended to use Oxy-Aid by [employer] in the interim\par -Discussed the risks/associations with coronary artery disease, atrial fibrillation, arrhythmia, memory loss, issues with concentration, stroke risk, hypertension, nocturia, chronic reflux/Terrazas’s esophagus some but not all inclusive. Treatment options discussed including CPAP/BiPAP machine, oral appliance, ProVent therapy, Oxy-Aid by [employer], new technologies, or positional sleep.\par \par problem 5: fatigue and use of supplements\par -continue baby aspirin QD\par -continue Co-Q 10 200 mg QD\par -continue L- Carnitine 500 mg BID\par \par problem 6: dysphonia\par -recommended to take Dolce Voce\par \par problem 7: tinnitus\par -recommended to try Quietus\par -recommended Breath-right strips QHS\par \par Problem 8: Health Maintenance/COVID19 Precautions:\par -Recommended Supplements:\par -SPM\par -Tumeric\par -Topricin\par \par - s/p covid 19 vaccine (Adalid & Adalid) \par - Clean your hands often. Wash your hands often with soap and water for at least 20 seconds, especially after blowing your nose, coughing, or sneezing, or having been in a public place.\par - If soap and water are not available, use a hand  that contains at least 60% alcohol.\par - To the extent possible, avoid touching high-touch surfaces in public places - elevator buttons, door handles, handrails, handshaking with people, etc. Use a tissue or your sleeve to cover your hand or finger if you must touch something.\par - Wash your hands after touching surfaces in public places.\par Immune Support Recommendations:\par -OTC Vitamin C 500mg BID \par -OTC Quercetin 250-500mg BID \par -OTC Zinc 75-100mg per day \par -OTC Melatonin 1or 2mg a night \par -OTC Vitamin D 1-4000mg per day \par -OTC Tonic Water 8oz per day\par \par \par problem 9: health maintenance \par - rash / itch - Allergic profile - s/p blood work to include: IgE level, eosinophil level, vitamin D level, food IgE level, and asthma profile\par - Recommended to see Dermatologist - Dr. Mitch Boxer \par -recommended Topricin cream for Derm \par -refused 2018 flu shot - recommended to get flu shot this year \par -recommended strep pneumonia vaccines: Prevnar-13 vaccine (up to date), followed by Pneumo vaccine 23 one year following\par -recommended early intervention for URIs\par -recommended regular osteoporosis evaluations\par -recommended early dermatological evaluations\par -recommended after the age of 50 to the age of 70, colonoscopy every 5 years \par \par \par F/U in 4 months \par He is encouraged to call with any changes, concerns, or questions

## 2021-06-08 NOTE — PHYSICAL EXAM
[No Acute Distress] : no acute distress [Normal Oropharynx] : normal oropharynx [Normal Appearance] : normal appearance [No Neck Mass] : no neck mass [Normal Rate/Rhythm] : normal rate/rhythm [Normal S1, S2] : normal s1, s2 [No Murmurs] : no murmurs [No Resp Distress] : no resp distress [Clear to Auscultation Bilaterally] : clear to auscultation bilaterally [No Abnormalities] : no abnormalities [Benign] : benign [Normal Gait] : normal gait [No Clubbing] : no clubbing [No Cyanosis] : no cyanosis [No Edema] : no edema [FROM] : FROM [Normal Color/ Pigmentation] : normal color/ pigmentation [No Focal Deficits] : no focal deficits [Oriented x3] : oriented x3 [Normal Affect] : normal affect [II] : Mallampati Class: II [TextBox_[id number]] : I:E 1:3; Clear

## 2021-06-08 NOTE — PROCEDURE
[FreeTextEntry1] : PFT revealed normal flows, with a FEV1 of 2.54L, which is 96% of predicted, with a normal flow volume loop\par \par Feno was 26; a normal value being less than 25. Fractional exhaled nitric oxide (FENO) is regarded as a simple, noninvasive method for assessing eosinophilic airway inflammation. Produced by a variety of cells within the lung, nitric oxide (NO) concentrations are generally low in healthy individuals. However, high concentrations of NO appear to be involved in nonspecific host defense mechanisms and chronic inflammatory  diseases such as asthma. The American Thoracic Society (ATS) therefore recommended using FENO to aid in the diagnosis and monitoring of eosinophilic airway inflammation and asthma, and for identifying steroid responsive individuals whose chronic respiratory symptoms may be caused by airway inflammation \par \par -Images and procedures reviewed in detail and discussed with patient.

## 2021-06-08 NOTE — HISTORY OF PRESENT ILLNESS
[FreeTextEntry1] : Mr. Davenport is a 80 year old male presenting to the office for a follow up visit for LPRD, hoarseness, SOHAIL, postnasal drip and RADs. His chief complaint is \par -He notes having mucus come up when doing crunches \par -He notes having voice changes for the past 2 months \par -He notes scheduling an appointment today with Dr. Gus Harrington \par -He notes occasional itchy eyes \par -He notes getting enough sleep \par -He notes using his CPAP religiously \par -He notes drinking plenty of water throughout the day \par -He notes feeling reflux and heartburn very rarely\par -He notes having a lump in his throat throat he feels he needs to clear \par -He notes PND \par -He notes chewing gum and cold water resolves the lump in his throat\par -He notes taking 60mg of Pepcid AC before bed and taking Prilosec in the morning \par - s/p covid 19 vaccine (Adalid & Adalid) \par -He notes now eating dates frequently \par \par  - patient denies any headaches, nausea, vomiting, fever, chills, sweats, chest pain, chest pressure, palpitations, coughing, wheezing, fatigue, diarrhea, constipation, dysphagia, myalgias, dizziness, leg swelling, leg pain, itchy ears, heartburn, reflux or sour taste in the mouth

## 2021-06-10 ENCOUNTER — TRANSCRIPTION ENCOUNTER (OUTPATIENT)
Age: 81
End: 2021-06-10

## 2021-06-15 ENCOUNTER — TRANSCRIPTION ENCOUNTER (OUTPATIENT)
Age: 81
End: 2021-06-15

## 2021-06-16 ENCOUNTER — NON-APPOINTMENT (OUTPATIENT)
Age: 81
End: 2021-06-16

## 2021-08-09 ENCOUNTER — APPOINTMENT (OUTPATIENT)
Dept: ORTHOPEDIC SURGERY | Facility: CLINIC | Age: 81
End: 2021-08-09
Payer: MEDICARE

## 2021-08-09 VITALS — BODY MASS INDEX: 23.49 KG/M2 | HEIGHT: 68 IN | WEIGHT: 155 LBS

## 2021-08-09 PROCEDURE — 99214 OFFICE O/P EST MOD 30 MIN: CPT

## 2021-08-09 NOTE — HISTORY OF PRESENT ILLNESS
[de-identified] : This is very nice 80-year-old gentleman experiencing bilateral hip and groin and thigh pain, which is mild in intensity. I have previously diagnosed him with bilateral hip osteoarthritis. The activities are not limited of daily living.  He does only have pain when he aggressively stretches and works out his muscles around the hips.  Walking tolerance is not limited.  Does not take any medications for the pain.  Stretching helps.  Does his own home exercise program and does not do formal physical therapy.  Does not walk with a cane or walker.  The patient denies any radiation of the pain to the feet and it is not associated with numbness, tingling, or weakness.

## 2021-08-09 NOTE — REASON FOR VISIT
[Follow-Up Visit] : a follow-up visit for [Osteoarthritis, Hip] : osteoarthritis, hip [Radiculopathy] : radiculopathy

## 2021-08-09 NOTE — DISCUSSION/SUMMARY
[de-identified] : This patient has mild bilateral hip osteoarthritis.  The patient is not an appropriate candidate for surgical intervention at this time. An extensive discussion was conducted on the natural history of the disease and the variety of surgical and non-surgical options available to the patient including, but not limited to non-steroidal anti-inflammatory medications, steroid injections, physical therapy, maintenance of ideal body weight, and reduction of activity.  He will try a home exercise program as well as take over-the-counter NSAIDs as needed pain. Refused rx pain meds. Suggested formal PT but he refused. Suggested intraaritcular hip cortisone injection. He will consider this option.  The patient will schedule an appointment as needed.\par

## 2021-08-09 NOTE — PHYSICAL EXAM
[de-identified] : Patient is well nourished, well-developed, in no acute distress, with appropriate mood and affect. The patient is oriented to time, place, and person. Respirations are even and unlabored. Gait evaluation does not reveal a limp. There is no inguinal adenopathy.  The right limb is well-perfused and showed 2+ dp/pt pulses, without skin lesions, shows a grossly normal motor and sensory examination. Examination of the hip shows no skin lesions. Hip motion is full and painless from 0-90 degrees extension to flexion, 20 degrees adduction and 20 degrees abduction, and 15 degrees internal and 30 degrees external rotation. FADIR is mildly positive and IFRAH is negative. Stinchfield test is negative. The left limb is well-perfused and showed 2+ dp/pt pulses, without skin lesions, shows a grossly normal motor and sensory examination. Examination of the hip shows no skin lesions. Hip motion is full and painless from 0-90 degrees extension to flexion, 20 degrees adduction and 20 degrees abduction, and 15 degrees internal and 30 degrees external rotation. FADIR is positive and IFRAH is positive. Stinchfield test is positive.  Leg lengths are approximately equal. Both hips are stable and muscle strength is normal with good strength with resisted abduction and adduction. Pedal pulses are palpable. [de-identified] : AP and lateral x-rays of the bilateral hip, pelvis, and femur were brought in byt he patietn which I reviewed and demonstrate mild degenerative joint disease of the hip with joint space narrowing, osteophyte formation, and subchondral sclerosis.\par

## 2021-08-17 ENCOUNTER — APPOINTMENT (OUTPATIENT)
Dept: RADIOLOGY | Facility: CLINIC | Age: 81
End: 2021-08-17
Payer: MEDICARE

## 2021-08-17 ENCOUNTER — RESULT REVIEW (OUTPATIENT)
Age: 81
End: 2021-08-17

## 2021-08-17 ENCOUNTER — OUTPATIENT (OUTPATIENT)
Dept: OUTPATIENT SERVICES | Facility: HOSPITAL | Age: 81
LOS: 1 days | End: 2021-08-17
Payer: MEDICARE

## 2021-08-17 DIAGNOSIS — M16.12 UNILATERAL PRIMARY OSTEOARTHRITIS, LEFT HIP: ICD-10-CM

## 2021-08-17 PROCEDURE — 27093 INJECTION FOR HIP X-RAY: CPT

## 2021-08-17 PROCEDURE — 73525 CONTRAST X-RAY OF HIP: CPT

## 2021-08-17 PROCEDURE — 27093 INJECTION FOR HIP X-RAY: CPT | Mod: LT

## 2021-08-17 PROCEDURE — 73525 CONTRAST X-RAY OF HIP: CPT | Mod: 26,LT

## 2021-08-28 ENCOUNTER — RX RENEWAL (OUTPATIENT)
Age: 81
End: 2021-08-28

## 2021-09-01 ENCOUNTER — APPOINTMENT (OUTPATIENT)
Dept: CARDIOLOGY | Facility: CLINIC | Age: 81
End: 2021-09-01
Payer: MEDICARE

## 2021-09-01 ENCOUNTER — NON-APPOINTMENT (OUTPATIENT)
Age: 81
End: 2021-09-01

## 2021-09-01 VITALS
OXYGEN SATURATION: 98 % | WEIGHT: 156 LBS | BODY MASS INDEX: 23.64 KG/M2 | HEART RATE: 68 BPM | HEIGHT: 68 IN | RESPIRATION RATE: 16 BRPM | SYSTOLIC BLOOD PRESSURE: 126 MMHG | DIASTOLIC BLOOD PRESSURE: 80 MMHG | TEMPERATURE: 98 F

## 2021-09-01 PROCEDURE — 93000 ELECTROCARDIOGRAM COMPLETE: CPT

## 2021-09-01 PROCEDURE — 99214 OFFICE O/P EST MOD 30 MIN: CPT

## 2021-09-01 PROCEDURE — 93306 TTE W/DOPPLER COMPLETE: CPT

## 2021-09-01 PROCEDURE — 93922 UPR/L XTREMITY ART 2 LEVELS: CPT

## 2021-09-01 RX ORDER — CIMETIDINE 800 MG/1
800 TABLET, FILM COATED ORAL
Qty: 90 | Refills: 1 | Status: COMPLETED | COMMUNITY
Start: 2020-09-09 | End: 2021-09-01

## 2021-09-01 NOTE — DISCUSSION/SUMMARY
[FreeTextEntry1] : This is a 81-year-old male with past medical history significant for mitral valve prolapse with moderate mitral valve regurgitation, hypertension, murmur, dyspepsia, reactive airway disease, who comes in for cardiac follow up evaluation.  The patient denies chest pain, palpitations, dizziness or syncope.\par The patient is having trouble with his left hip.  He recently received a cortisone injection and is involved in physical therapy.\par He reports that he may need left hip replacement surgery.\par He will have new blood work done with his primary care physician next month.\par The the patient will have an echo Doppler examination later today to evaluate his valvular disease, left ventricular function, chamber size, and rule out hypertrophy.\par Electrocardiogram done September 1, 2021 demonstrate normal sinus rhythm rate of 68 bpm is otherwise unremarkable. \par Blood work done March 13, 2021 which demonstrated cholesterol 160, HDL of 56, triglycerides of 37, LDL calculated of 97 mg/dL, hemoglobin A1c of 5.\par Echo Doppler examination done March 18, 2020 which demonstrated bileaflet mitral valve prolapse and moderate mitral valve regurgitation, thickened aortic valve leaflets with mild tricuspid and mild pulmonic valve regurgitation.  There was satisfactory left ventricular function with an ejection fraction 64%.\par Patient will have new blood work done with his primary care physician.  He is currently hemodynamically stable and asymptomatic from a cardiac standpoint.\par He had a normal exercise stress test March 18, 2020.\par He is hemodynamically stable and asymptomatic from a cardiac standpoint.\par The patient understands that aerobic exercises must be increased to 40 minutes 4 times per week. A detailed discussion of lifestyle modification was done today. The patient has a good understanding of the diagnosis, and treatment plan. Lifestyle modification was also outlined.

## 2021-09-01 NOTE — PHYSICAL EXAM
[Well Developed] : well developed [Well Nourished] : well nourished [No Acute Distress] : no acute distress [Normal Venous Pressure] : normal venous pressure [No Carotid Bruit] : no carotid bruit [Normal S1, S2] : normal S1, S2 [No Murmur] : no murmur [No Rub] : no rub [No Gallop] : no gallop heard [II] : a grade 2 [No Abnormalities] : the abdominal aorta was not enlarged and no bruit was heard [Clear Lung Fields] : clear lung fields [Good Air Entry] : good air entry [No Respiratory Distress] : no respiratory distress  [Soft] : abdomen soft [Non Tender] : non-tender [No Masses/organomegaly] : no masses/organomegaly [Normal Bowel Sounds] : normal bowel sounds [Normal Gait] : normal gait [No Edema] : no edema [No Cyanosis] : no cyanosis [No Clubbing] : no clubbing [No Varicosities] : no varicosities [No Rash] : no rash [No Skin Lesions] : no skin lesions [Moves all extremities] : moves all extremities [No Focal Deficits] : no focal deficits [Normal Speech] : normal speech [Alert and Oriented] : alert and oriented [Normal memory] : normal memory [General Appearance - Well Developed] : well developed [Normal Appearance] : normal appearance [Well Groomed] : well groomed [General Appearance - Well Nourished] : well nourished [No Deformities] : no deformities [General Appearance - In No Acute Distress] : no acute distress [Normal Conjunctiva] : the conjunctiva exhibited no abnormalities [Normal Oral Mucosa] : normal oral mucosa [Normal Oropharynx] : normal oropharynx [Normal Jugular Venous A Waves Present] : normal jugular venous A waves present [Normal Jugular Venous V Waves Present] : normal jugular venous V waves present [No Jugular Venous Merino A Waves] : no jugular venous merino A waves [Respiration, Rhythm And Depth] : normal respiratory rhythm and effort [Exaggerated Use Of Accessory Muscles For Inspiration] : no accessory muscle use [Auscultation Breath Sounds / Voice Sounds] : lungs were clear to auscultation bilaterally [Abdomen Soft] : soft [Bowel Sounds] : normal bowel sounds [Abnormal Walk] : normal gait [Nail Clubbing] : no clubbing of the fingernails [Skin Color & Pigmentation] : normal skin color and pigmentation [Cyanosis, Localized] : no localized cyanosis [Skin Turgor] : normal skin turgor [] : no rash [Oriented To Time, Place, And Person] : oriented to person, place, and time [Impaired Insight] : insight and judgment were intact [Affect] : the affect was normal [Mood] : the mood was normal [5th Left ICS - MCL] : palpated at the 5th LICS in the midclavicular line [Normal] : normal [No Precordial Heave] : no precordial heave was noted [Normal Rate] : normal [Rhythm Regular] : regular [Normal S1] : normal S1 [Normal S2] : normal S2 [I] : a grade 1 [2+] : left 2+ [No Pitting Edema] : no pitting edema present

## 2021-09-01 NOTE — REASON FOR VISIT
[Follow-Up - Clinic] : a clinic follow-up of [Structural Heart and Valve Disease] : structural heart and valve disease [Hypertension] : hypertension [Mitral Regurgitation] : mitral regurgitation [FreeTextEntry1] : murmur, dyspepsia

## 2021-09-02 RX ORDER — TRIAMCINOLONE ACETONIDE 1 MG/G
0.1 CREAM TOPICAL 3 TIMES DAILY
Qty: 80 | Refills: 0 | Status: COMPLETED | COMMUNITY
Start: 2020-04-23 | End: 2021-09-02

## 2021-09-02 RX ORDER — ASPIRIN 81 MG/1
81 TABLET ORAL DAILY
Refills: 0 | Status: ACTIVE | COMMUNITY
Start: 2020-12-18

## 2021-10-06 ENCOUNTER — APPOINTMENT (OUTPATIENT)
Dept: PULMONOLOGY | Facility: CLINIC | Age: 81
End: 2021-10-06
Payer: MEDICARE

## 2021-10-06 VITALS
HEART RATE: 68 BPM | BODY MASS INDEX: 23.64 KG/M2 | DIASTOLIC BLOOD PRESSURE: 70 MMHG | SYSTOLIC BLOOD PRESSURE: 120 MMHG | HEIGHT: 68 IN | TEMPERATURE: 96.7 F | WEIGHT: 156 LBS | RESPIRATION RATE: 16 BRPM | OXYGEN SATURATION: 98 %

## 2021-10-06 DIAGNOSIS — L50.9 URTICARIA, UNSPECIFIED: ICD-10-CM

## 2021-10-06 DIAGNOSIS — R49.0 DYSPHONIA: ICD-10-CM

## 2021-10-06 PROCEDURE — 99214 OFFICE O/P EST MOD 30 MIN: CPT

## 2021-10-06 NOTE — HISTORY OF PRESENT ILLNESS
[FreeTextEntry1] : Mr. Davenport is a 81 year old male presenting to the office for a follow up visit for LPRD, hoarseness, SOHAIL, postnasal drip and RADs. His chief complaint is \par \par -he notes generally feeling good\par -he notes exercising regularly\par -he notes weight is stable\par -he notes heartburn and reflux improved and resolved with gargling \par -he notes energy level 10/10\par -he notes awaiting CPAP machine exchange \par -he denies palpitation. \par -he notes 6-7 hrs of sleep\par -he notes appetite is stable\par -he notes itch resolved with herbal drink \par -he notes awaiting Ophthalmologist\par -he notes awaiting Dr Frias for hip flexor issues and nerve discomfort from ankle to hip when leaning forward\par -he notes having tight quads\par -he notes issues putting socks on and leaning forward \par -s/p Covid 19 Vaccine JNJ x1 \par \par - He  denies any headaches, nausea, vomiting, fever, chills, sweats, chest pains, chest pressure, diarrhea, constipation, dysphagia, dizziness, leg swelling, leg pain, itchy eyes, itchy ears, heartburn, reflux, or sour taste in the mouth.

## 2021-10-06 NOTE — PROCEDURE
[FreeTextEntry1] : ECHO (9.1.2021) revealed bileaflet mitral valve prolapse. Mild moderate mitral regurgitation. Thickened aortic valve. Normal left ventricular systolic function. No segmental wall motion abnormalities. Mild to moderate tricuspid regurgitation. Mild pulmonic regurgitation. Estimated pulmonary artery systolic pressure of 37 mm HG assuming right atrial pressure equals 8 mm Hg, consistent with borderline pulmonary pressure

## 2021-10-06 NOTE — ASSESSMENT
[FreeTextEntry1] : Mr. Davenport is 81 y.o M who has a history of RADs, allergies, GERD and OSAS, BPH, (+) IgE, Asthma.  He presents to the office today for a follow up.  His top issue is concern over mild PAH on ECHO\par \par problem 1: RADs/mild intermittent asthma (quiet)\par -quiet at this point in time\par -no therapy at this point time \par -Asthma is  believed to be caused by inherited (genetic) and environmental factor, but its exact cause is unknown. Asthma may be triggered by allergens, lung infections, or irritants in the air. Asthma triggers are different for each person \par -Inhaler technique reviewed as well as oral hygiene techniques reviewed with patient. Avoidance of cold air, extremes of temperature, rescue inhaler should be used before exercise. Order of medication reviewed with patient. Recommended use of a cool mist humidifier in the bedroom. \par \par Problem 1A: Elevated IgE \par - candidate for Xolair \par -Xolair is a recombinant DNA- derived humanized IgG1K monoclonal antibody that selectively binds ot human immunoglobulin E (IgE). Xolair is produced by a Chinese hamster ovary cell suspension culture in nutrient medium containing the antibiotic gentamicin. Gentamicin is not detectable in the final product. Xolair is a sterile, white, preservative free, lyophilized powder contained in a single use vial that is reconstituted with sterile water for suspension. Side effects include: wheezing, tightness of the chest, trouble breathing, hives, skin rash, feeling anxious or light-headed, fainting, warmth or tingling under skin, or swelling of face, lips, or tongue \par \par Problem 1B Cardio (PAH) mild\par -complete yearly ECHO next 9/2022\par \par problem 2: allergic rhinitis \par -continue to use Qnasl or Flonase 1 sniff each nostril BID\par -continue to use Claritin OTC\par -s/p blood work to include: IgE level (+), eosinophil level (-), vitamin D level (-), food IgE level (+), and asthma profile (+)\par -Environmental measures for allergies were encouraged including mattress and pillow cover, air purifier, and environmental controls.\par \par problem 3: GERD\par -continue to use Prilosec 20 mg QOD, transition to 10 mg QD if able \par - continue Pepcid AC 60 mg QHS\par  -Add DGL pre-meal qHS \par -Recommend Alkalol Gargle PRN\par -continue to follow up with Dr. Huffman\par -Things to avoid including overeating, spicy foods, tight clothing, eating within three hours of bed, this list is not all inclusive. \par -For treatment of reflux, possible options discussed including diet control, H2 blockers, PPIs, as well as coating motility agents discussed as treatment options. Timing of meals and proximity of last meal to sleep were discussed. If symptoms persist, a formal gastrointestinal evaluation is needed.\par \par \par problem 4: OSAS\par - Patient is using and benefiting from his CPAP\par -he is being recommended to continue use of the CPAP machine  and has been compliant and tolerating it well. \par -he is recommended to use Oxy-Aid by [employer] in the interim\par -Discussed the risks/associations with coronary artery disease, atrial fibrillation, arrhythmia, memory loss, issues with concentration, stroke risk, hypertension, nocturia, chronic reflux/Terrazsa’s esophagus some but not all inclusive. Treatment options discussed including CPAP/BiPAP machine, oral appliance, ProVent therapy, Oxy-Aid by [employer], new technologies, or positional sleep.\par \par problem 5: fatigue and use of supplements\par -continue baby aspirin QD\par -continue Co-Q 10 200 mg QD\par -continue L- Carnitine 500 mg BID\par \par problem 6: dysphonia\par -recommended to take Dolce Voce\par \par problem 7: tinnitus\par -recommended to try Quietus\par -recommended Breath-right strips QHS\par \par Problem 8: Health Maintenance/COVID19 Precautions:\par -Recommended Supplements:\par -SPM\par -Tumeric\par -Topricin\par \par - s/p covid 19 vaccine (Adalid & Adalid) \par -Covid 19 precaution, vaccine and booster discussed at length and recommended \par \par - Clean your hands often. Wash your hands often with soap and water for at least 20 seconds, especially after blowing your nose, coughing, or sneezing, or having been in a public place.\par - If soap and water are not available, use a hand  that contains at least 60% alcohol.\par - To the extent possible, avoid touching high-touch surfaces in public places - elevator buttons, door handles, handrails, handshaking with people, etc. Use a tissue or your sleeve to cover your hand or finger if you must touch something.\par - Wash your hands after touching surfaces in public places.\par Immune Support Recommendations:\par -OTC Vitamin C 500mg BID \par -OTC Quercetin 250-500mg BID \par -OTC Zinc 75-100mg per day \par -OTC Melatonin 1or 2mg a night \par -OTC Vitamin D 1-4000mg per day \par -OTC Tonic Water 8oz per day\par \par \par problem 9: health maintenance \par -ortho adalberto Larose\par - rash / itch - Allergic profile - s/p blood work to include: IgE level, eosinophil level, vitamin D level, food IgE level, and asthma profile ([place])\par - Recommended to see Dermatologist - Dr. Mitch Boxer (past)\par -recommended Topricin cream for Derm \par -refused 2018 flu shot - recommended to get flu shot this year \par -recommended strep pneumonia vaccines: Prevnar-13 vaccine (up to date), followed by Pneumo vaccine 23 one year following\par -recommended early intervention for URIs\par -recommended regular osteoporosis evaluations\par -recommended early dermatological evaluations\par -recommended after the age of 50 to the age of 70, colonoscopy every 5 years \par \par \par F/U in 4 months \par He is encouraged to call with any changes, concerns, or questions 
fever/flank pain

## 2021-10-06 NOTE — ADDENDUM
[FreeTextEntry1] : Documented by Sloan Bucio acting as a scribe for Dr. Turner Sanchez on 10/06/2021 \par \par All medical record entries made by the Scribe were at my, Dr. Turner Sanchez's, direction and personally dictated by me on 10/06/2021 . I have reviewed the chart and agree that the record accurately reflects my personal performance of the history, physical exam, assessment and plan. I have also personally directed, reviewed, and agree with the discharge instructions

## 2021-10-08 ENCOUNTER — APPOINTMENT (OUTPATIENT)
Dept: ORTHOPEDIC SURGERY | Facility: CLINIC | Age: 81
End: 2021-10-08
Payer: MEDICARE

## 2021-10-08 VITALS
HEART RATE: 69 BPM | WEIGHT: 156 LBS | SYSTOLIC BLOOD PRESSURE: 127 MMHG | DIASTOLIC BLOOD PRESSURE: 80 MMHG | BODY MASS INDEX: 23.64 KG/M2 | HEIGHT: 68 IN

## 2021-10-08 DIAGNOSIS — M16.11 UNILATERAL PRIMARY OSTEOARTHRITIS, RIGHT HIP: ICD-10-CM

## 2021-10-08 PROCEDURE — 99214 OFFICE O/P EST MOD 30 MIN: CPT

## 2021-10-09 NOTE — HISTORY OF PRESENT ILLNESS
[de-identified] : This is very nice 81-year-old gentleman experiencing bilateral hip and groin and thigh pain, which is now severe in intensity.  The left hip is more symptomatic than the right.  I have previously diagnosed him with bilateral hip osteoarthritis. The activities are limited for activities of daily living.  Walking tolerance is limited.  He tried bilateral hip intra-articular cortisone injections which only helped for 2 days approximately 1 month ago.  Physical therapy actually made the pain worse.  He separately also has pain that travels from his low back down his left lower extremity to the foot and we discussed that this is a separate problem likely from lumbar stenosis and radiculopathy.

## 2021-10-09 NOTE — DISCUSSION/SUMMARY
[de-identified] : This patient has bilateral hip osteoarthritis.  The left is more symptomatic than the right.  He would like to proceed with a direct anterior approach left total knee arthroplasty.\par \par The patient is an appropriate candidate for consideration of left total hip replacement. An extensive discussion was conducted of the natural history of the disease and the variety of surgical and non-surgical treatment options available to the patient. A risk/benefit analysis was discussed with the patient reviewing the advantages and disadvantages of surgical intervention at this time. Both the level and length of the patient's pain have made additional conservative treatment measures consisting of NSAIDs, physical therapy, and/or corticosteroids contraindicated. A full explanation was given of the nature and the purpose of the procedure and anesthesia, its benefits, possible alternative methods of diagnosis or treatment, the risks involved, the possibility of complications, the foreseeable consequences of the procedure and the possible results of the non-treatment. I reviewed the plan of care as well as used a model of a total hip implant equivalent to the one that will be used for their total hip joint replacement. The ability to secure the implant utilizing cement or cementless (press-fit) was discussed with the patient. The patient agrees with the plan of care, as well as the use of implants for their total hip replacement. \par \par No guarantee or assurance was made as to the results that may be obtained. Specifically, the risks were identified to include, but are not limited to the following: Infection, phlebitis, pulmonary embolism, death, paralysis, dislocation, pain, stiffness, instability, limp, weakness, breakage, leg-length inequality, uncontrolled bleeding, nerve injury, blood vessel injury, pressure sores, anesthetic risks, delayed healing of wound and bone, and wear and loosening. Further discussion was undertaken with the patient about the details of surgical preparation, treatment, and postoperative rehabilitation including medical clearance, autotransfusion, the hospital course, and the postoperative rehabilitation involved. All in all, I feel that this patient is a good candidate for surgical reconstruction.\par \par The patient and I discussed the current SARS-CoV-2 (COVID-19) pandemic which has affected our local hospitals. We discussed that our hospitals treat patients with COVID-19. All efforts will be made to avoid cohorting the patient with diagnosed or suspected COVID-19 patient. They also understand that we will screen them 24-48 hours prior to surgery. Despite our best efforts, there is a potential risk for iatrogenic transmission of COVID-19 to the patient during the perioperative period. Benja COVID-19 during the perioperative period may increase the patient´s risks of an adverse outcome including postoperative pneumonia, difficulty breathing, requirement for a breathing tube (general endotracheal intubation), and death. The patient is understanding of this risk, and is willing to proceed with surgery at this time.

## 2021-10-09 NOTE — PHYSICAL EXAM
[de-identified] : Patient is well nourished, well-developed, in no acute distress, with appropriate mood and affect. The patient is oriented to time, place, and person. Respirations are even and unlabored. Gait evaluation reveals a limp. There is no inguinal adenopathy.  The right limb is well-perfused and showed 2+ dp/pt pulses, without skin lesions, shows a grossly normal motor and sensory examination. Examination of the hip shows no skin lesions. Hip motion is reduced and causes pain. FADIR is positive and IFRAH is positive. Stinchfield test is positive. The left limb is well-perfused and showed 2+ dp/pt pulses, without skin lesions, shows a grossly normal motor and sensory examination. Examination of the hip shows no skin lesions. Hip motion is reduced and causes pain. FADIR is positive and IFRAH is positive. Stinchfield test is positive.  Leg lengths are approximately equal. Both hips are stable and muscle strength is normal with good strength with resisted abduction and adduction. Pedal pulses are palpable. [de-identified] : AP and lateral x-rays of the bilateral hip, pelvis, and femur were brought in by the patient which I reviewed and demonstrate degenerative joint disease of the hip with joint space narrowing, osteophyte formation, and subchondral sclerosis.\par

## 2021-10-17 ENCOUNTER — EMERGENCY (EMERGENCY)
Facility: HOSPITAL | Age: 81
LOS: 1 days | Discharge: HOME | End: 2021-10-17
Attending: EMERGENCY MEDICINE
Payer: MEDICARE

## 2021-10-17 VITALS
TEMPERATURE: 98 F | HEIGHT: 68 IN | DIASTOLIC BLOOD PRESSURE: 87 MMHG | WEIGHT: 156.09 LBS | SYSTOLIC BLOOD PRESSURE: 133 MMHG | RESPIRATION RATE: 20 BRPM | HEART RATE: 80 BPM | OXYGEN SATURATION: 96 %

## 2021-10-17 PROCEDURE — 99283 EMERGENCY DEPT VISIT LOW MDM: CPT

## 2021-10-17 PROCEDURE — 99282 EMERGENCY DEPT VISIT SF MDM: CPT

## 2021-10-17 RX ORDER — CARBAMIDE PEROXIDE 81.86 MG/ML
1 SOLUTION/ DROPS AURICULAR (OTIC) ONCE
Refills: 0 | Status: COMPLETED | OUTPATIENT
Start: 2021-10-17 | End: 2021-10-17

## 2021-10-17 RX ADMIN — CARBAMIDE PEROXIDE 1 DROP(S): 81.86 SOLUTION/ DROPS AURICULAR (OTIC) at 17:25

## 2021-10-17 NOTE — ED PROVIDER NOTE - NSFOLLOWUPINSTRUCTIONS_ED_ALL_ED_FT
Follow up with your Primary Care Physician within the next 2-3 days  Bring a copy of your test results with you to your appointment  Continue your current medication regimen  Return to the Emergency Room if you experience new or worsening symptoms abdominal pain, nausea, vomiting, fever chills, cough, chest pain, shortness of breath, dizziness, slurred speech, weakness, gait abnormality     FOLLOW UP WITH YOUR ENT AS A ROUTINE VISIT WITHIN 1-2 WEEKS

## 2021-10-17 NOTE — ED PROVIDER NOTE - OBJECTIVE STATEMENT
82 y/o male PMHx hypothyroidism, HTN no PSx presented to the ED with left ear fulness x1 week. Patient concerned for cerumen impaction and has routine cerumen cleanings every 3-4 months. Patient was unable to get ENT appointment this week prompting ED eval and did "not want to deal with it anymore". Patient denied headache, preauricular/ post auricular tenderness, N/V/D/ fever chills, cough numbness, slurred speech, dizziness,

## 2021-10-17 NOTE — ED PROVIDER NOTE - ATTENDING CONTRIBUTION TO CARE
Attending MD Maci León:   I personally have seen and examined this patient.  Physician assistant note reviewed and agree on plan of care and except where noted.  See HPI, PE, and MDM for details.

## 2021-10-17 NOTE — ED ADULT NURSE NOTE - OBJECTIVE STATEMENT
81y o M with pmhx HTN, hypothyroid presents with cerumen impaction of the left ear. Pt states he gets routine disimpactions every 2-3 months and has not been able to see his MD. Pt is in NAD. VSS on arrival, no other complaints.

## 2021-10-17 NOTE — ED PROVIDER NOTE - PATIENT PORTAL LINK FT
You can access the FollowMyHealth Patient Portal offered by [facility] by registering at the following website: [email or web address]. By joining [facility]’s FollowMyHealth portal, you will also be able to view your health information using other applications (apps) compatible with our system.

## 2021-10-17 NOTE — ED PROVIDER NOTE - CLINICAL SUMMARY MEDICAL DECISION MAKING FREE TEXT BOX
Attending Maci León: 80 yo male h/o cerumen impaction presenting with request to have cerumen removed. pt denies any hearing changes, sore throats or difficuty swallowing. pt was unable to see his ent doctor until the end of the week and had company coming so requested removal in the ed. no sore throats or fevers. no difficulty swallowing. on exam no auricular ttp, or mastoid ttp. pt with cerumen that is deep wtihin the canal. will give debrox and re-eval. likely follow up with ent. no headaches, vision changes or neuro changes

## 2021-11-09 PROBLEM — I10 ESSENTIAL (PRIMARY) HYPERTENSION: Chronic | Status: ACTIVE | Noted: 2021-10-17

## 2021-11-09 PROBLEM — E03.9 HYPOTHYROIDISM, UNSPECIFIED: Chronic | Status: ACTIVE | Noted: 2021-10-17

## 2021-11-11 ENCOUNTER — NON-APPOINTMENT (OUTPATIENT)
Age: 81
End: 2021-11-11

## 2021-11-12 ENCOUNTER — OUTPATIENT (OUTPATIENT)
Dept: OUTPATIENT SERVICES | Facility: HOSPITAL | Age: 81
LOS: 1 days | End: 2021-11-12
Payer: MEDICARE

## 2021-11-12 VITALS
DIASTOLIC BLOOD PRESSURE: 93 MMHG | HEIGHT: 67 IN | WEIGHT: 156.09 LBS | SYSTOLIC BLOOD PRESSURE: 137 MMHG | TEMPERATURE: 98 F | HEART RATE: 75 BPM | OXYGEN SATURATION: 99 %

## 2021-11-12 DIAGNOSIS — Z01.818 ENCOUNTER FOR OTHER PREPROCEDURAL EXAMINATION: ICD-10-CM

## 2021-11-12 DIAGNOSIS — G47.33 OBSTRUCTIVE SLEEP APNEA (ADULT) (PEDIATRIC): ICD-10-CM

## 2021-11-12 DIAGNOSIS — Z29.9 ENCOUNTER FOR PROPHYLACTIC MEASURES, UNSPECIFIED: ICD-10-CM

## 2021-11-12 DIAGNOSIS — M16.12 UNILATERAL PRIMARY OSTEOARTHRITIS, LEFT HIP: ICD-10-CM

## 2021-11-12 DIAGNOSIS — H26.9 UNSPECIFIED CATARACT: Chronic | ICD-10-CM

## 2021-11-12 DIAGNOSIS — E03.9 HYPOTHYROIDISM, UNSPECIFIED: ICD-10-CM

## 2021-11-12 DIAGNOSIS — Z98.890 OTHER SPECIFIED POSTPROCEDURAL STATES: Chronic | ICD-10-CM

## 2021-11-12 DIAGNOSIS — Z87.828 PERSONAL HISTORY OF OTHER (HEALED) PHYSICAL INJURY AND TRAUMA: Chronic | ICD-10-CM

## 2021-11-12 DIAGNOSIS — M19.90 UNSPECIFIED OSTEOARTHRITIS, UNSPECIFIED SITE: ICD-10-CM

## 2021-11-12 LAB
A1C WITH ESTIMATED AVERAGE GLUCOSE RESULT: 5.6 % — SIGNIFICANT CHANGE UP (ref 4–5.6)
ANION GAP SERPL CALC-SCNC: 13 MMOL/L — SIGNIFICANT CHANGE UP (ref 5–17)
BLD GP AB SCN SERPL QL: NEGATIVE — SIGNIFICANT CHANGE UP
BUN SERPL-MCNC: 17 MG/DL — SIGNIFICANT CHANGE UP (ref 7–23)
CALCIUM SERPL-MCNC: 9.6 MG/DL — SIGNIFICANT CHANGE UP (ref 8.4–10.5)
CHLORIDE SERPL-SCNC: 103 MMOL/L — SIGNIFICANT CHANGE UP (ref 96–108)
CO2 SERPL-SCNC: 24 MMOL/L — SIGNIFICANT CHANGE UP (ref 22–31)
CREAT SERPL-MCNC: 0.96 MG/DL — SIGNIFICANT CHANGE UP (ref 0.5–1.3)
ESTIMATED AVERAGE GLUCOSE: 114 MG/DL — SIGNIFICANT CHANGE UP (ref 68–114)
GLUCOSE SERPL-MCNC: 87 MG/DL — SIGNIFICANT CHANGE UP (ref 70–99)
HCT VFR BLD CALC: 44.5 % — SIGNIFICANT CHANGE UP (ref 39–50)
HGB BLD-MCNC: 14.5 G/DL — SIGNIFICANT CHANGE UP (ref 13–17)
MCHC RBC-ENTMCNC: 28.8 PG — SIGNIFICANT CHANGE UP (ref 27–34)
MCHC RBC-ENTMCNC: 32.6 GM/DL — SIGNIFICANT CHANGE UP (ref 32–36)
MCV RBC AUTO: 88.5 FL — SIGNIFICANT CHANGE UP (ref 80–100)
MRSA PCR RESULT.: SIGNIFICANT CHANGE UP
NRBC # BLD: 0 /100 WBCS — SIGNIFICANT CHANGE UP (ref 0–0)
PLATELET # BLD AUTO: 218 K/UL — SIGNIFICANT CHANGE UP (ref 150–400)
POTASSIUM SERPL-MCNC: 4 MMOL/L — SIGNIFICANT CHANGE UP (ref 3.5–5.3)
POTASSIUM SERPL-SCNC: 4 MMOL/L — SIGNIFICANT CHANGE UP (ref 3.5–5.3)
RBC # BLD: 5.03 M/UL — SIGNIFICANT CHANGE UP (ref 4.2–5.8)
RBC # FLD: 14.4 % — SIGNIFICANT CHANGE UP (ref 10.3–14.5)
RH IG SCN BLD-IMP: POSITIVE — SIGNIFICANT CHANGE UP
S AUREUS DNA NOSE QL NAA+PROBE: SIGNIFICANT CHANGE UP
SODIUM SERPL-SCNC: 140 MMOL/L — SIGNIFICANT CHANGE UP (ref 135–145)
WBC # BLD: 6.32 K/UL — SIGNIFICANT CHANGE UP (ref 3.8–10.5)
WBC # FLD AUTO: 6.32 K/UL — SIGNIFICANT CHANGE UP (ref 3.8–10.5)

## 2021-11-12 PROCEDURE — 86901 BLOOD TYPING SEROLOGIC RH(D): CPT

## 2021-11-12 PROCEDURE — 83036 HEMOGLOBIN GLYCOSYLATED A1C: CPT

## 2021-11-12 PROCEDURE — 36415 COLL VENOUS BLD VENIPUNCTURE: CPT

## 2021-11-12 PROCEDURE — 80048 BASIC METABOLIC PNL TOTAL CA: CPT

## 2021-11-12 PROCEDURE — 86900 BLOOD TYPING SEROLOGIC ABO: CPT

## 2021-11-12 PROCEDURE — 86850 RBC ANTIBODY SCREEN: CPT

## 2021-11-12 PROCEDURE — 85027 COMPLETE CBC AUTOMATED: CPT

## 2021-11-12 PROCEDURE — 87641 MR-STAPH DNA AMP PROBE: CPT

## 2021-11-12 PROCEDURE — G0463: CPT

## 2021-11-12 PROCEDURE — 87640 STAPH A DNA AMP PROBE: CPT

## 2021-11-12 NOTE — H&P PST ADULT - NSCAFFEINETYPE_GEN_ALL_CORE_SD
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    Anesthesia Type:  General    Note:     Postop Pain Control: Uneventful            Sign Out: Well controlled pain   PONV: No   Neuro/Psych: Uneventful            Sign Out: Acceptable/Baseline neuro status   Airway/Respiratory: Uneventful            Sign Out: Acceptable/Baseline resp. status   CV/Hemodynamics: Uneventful            Sign Out: Acceptable CV status   Other NRE: NONE   DID A NON-ROUTINE EVENT OCCUR? No         Last vitals:  Vitals:    03/02/21 1115 03/02/21 1130 03/02/21 1152   BP: 111/70 115/77 110/76   Pulse: 74 71    Resp: 13 18 12   Temp: 35.8  C (96.4  F) 35.8  C (96.4  F)    SpO2: 100% 100% 100%       Last vitals prior to Anesthesia Care Transfer:  CRNA VITALS  3/2/2021 0947 - 3/2/2021 1047      3/2/2021             Pulse:  87    SpO2:  100 %    Resp Rate (observed):  15          Electronically Signed By: Julia Moody MD  March 2, 2021  5:21 PM   coffee

## 2021-11-12 NOTE — H&P PST ADULT - PROBLEM SELECTOR PLAN 2
Scheduled for LEFT total hip arthroplasty, direct anterior approach on 11/22/21 with Dr. Larose  Preop instructions and chlorhexidine soap  Labs CBC BMP A1c, T&S MRSA performed in PST  Covid swab on 11/19/21

## 2021-11-12 NOTE — H&P PST ADULT - NSICDXPASTMEDICALHX_GEN_ALL_CORE_FT
PAST MEDICAL HISTORY:  History of BPH     HTN (hypertension)     Hypothyroidism     SOHAIL (obstructive sleep apnea)

## 2021-11-12 NOTE — H&P PST ADULT - WEIGHT IN LBS
Pt reports being 10w pregnant with abdominal cramping onset today. Has hx of 3 miscarriages.  +light pink discharge x2 today.  Pt has seen her OB and had an ultrasound at 6 weeks  
156

## 2021-11-12 NOTE — H&P PST ADULT - LAST ECHOCARDIOGRAM
Sept 2021- Sept 2021- EF 66%, mild moderate mitral regurgitation, mild- moderate tricupsid regurg, mild pulmonic regur, borderline pHTN

## 2021-11-12 NOTE — H&P PST ADULT - HISTORY OF PRESENT ILLNESS
This is an 81 year old male with past medical history of HTN, Murmur, HLD, hypothyroidism, SOHAIL (uses CPAP @ [facility]), BPH      Reports having left sided achy/dull hip pain has tried non- surgical managment with physical therapy and received cortisone injection in August 2021. States pain is now interfering with daily activities  Denies any assistive devices during ambulation. Presenting to PST for scheduled left total hip arthroplasty, direct anterior approach on 11/22/21 with DR. Larose    ** Covid test on 11/19/21  ** Denies any history of COVID 19 infection  ** Covid Vaccinated - [facility] 5/2021 This is an 81 year old male with past medical history of HTN, Murmur, HLD, hypothyroidism, SOHAIL (uses CPAP @ [facility]), and BPH      Reports having left sided achy/dull hip pain has tried non- surgical managment with physical therapy and received cortisone injection in August 2021. States pain is now interfering with daily activities  Denies any assistive devices during ambulation. Presenting to PST for scheduled left total hip arthroplasty, direct anterior approach on 11/22/21 with Dr. Larose    ** Covid test on 11/19/21  ** Denies any history of COVID 19 infection  ** Covid Vaccinated - [facility] 5/2021

## 2021-11-12 NOTE — H&P PST ADULT - ASSESSMENT

## 2021-11-12 NOTE — H&P PST ADULT - NSICDXPASTSURGICALHX_GEN_ALL_CORE_FT
PAST SURGICAL HISTORY:  History of torn meniscus of knee 20 years ago    Right cataract     S/P urological surgery URO lift

## 2021-11-15 ENCOUNTER — APPOINTMENT (OUTPATIENT)
Dept: CARDIOLOGY | Facility: CLINIC | Age: 81
End: 2021-11-15
Payer: MEDICARE

## 2021-11-15 ENCOUNTER — NON-APPOINTMENT (OUTPATIENT)
Age: 81
End: 2021-11-15

## 2021-11-15 VITALS
HEART RATE: 86 BPM | BODY MASS INDEX: 22.88 KG/M2 | OXYGEN SATURATION: 99 % | SYSTOLIC BLOOD PRESSURE: 128 MMHG | WEIGHT: 151 LBS | RESPIRATION RATE: 16 BRPM | DIASTOLIC BLOOD PRESSURE: 82 MMHG | TEMPERATURE: 98.4 F | HEIGHT: 68 IN

## 2021-11-15 DIAGNOSIS — I73.9 PERIPHERAL VASCULAR DISEASE, UNSPECIFIED: ICD-10-CM

## 2021-11-15 PROBLEM — Z87.438 PERSONAL HISTORY OF OTHER DISEASES OF MALE GENITAL ORGANS: Chronic | Status: ACTIVE | Noted: 2021-11-12

## 2021-11-15 PROBLEM — G47.33 OBSTRUCTIVE SLEEP APNEA (ADULT) (PEDIATRIC): Chronic | Status: ACTIVE | Noted: 2021-11-12

## 2021-11-15 PROCEDURE — ZZZZZ: CPT

## 2021-11-15 PROCEDURE — 99214 OFFICE O/P EST MOD 30 MIN: CPT

## 2021-11-15 PROCEDURE — 93000 ELECTROCARDIOGRAM COMPLETE: CPT | Mod: NC

## 2021-11-15 RX ORDER — DOXAZOSIN 2 MG/1
2 TABLET ORAL
Refills: 0 | Status: COMPLETED | COMMUNITY
End: 2021-11-15

## 2021-11-15 NOTE — PHYSICAL EXAM
[Well Developed] : well developed [Well Nourished] : well nourished [No Acute Distress] : no acute distress [Normal Venous Pressure] : normal venous pressure [No Carotid Bruit] : no carotid bruit [Normal S1, S2] : normal S1, S2 [No Murmur] : no murmur [No Rub] : no rub [No Gallop] : no gallop heard [II] : a grade 2 [No Abnormalities] : the abdominal aorta was not enlarged and no bruit was heard [Clear Lung Fields] : clear lung fields [Good Air Entry] : good air entry [No Respiratory Distress] : no respiratory distress  [Soft] : abdomen soft [Non Tender] : non-tender [No Masses/organomegaly] : no masses/organomegaly [Normal Bowel Sounds] : normal bowel sounds [Normal Gait] : normal gait [No Edema] : no edema [No Cyanosis] : no cyanosis [No Clubbing] : no clubbing [No Varicosities] : no varicosities [No Rash] : no rash [No Skin Lesions] : no skin lesions [Moves all extremities] : moves all extremities [No Focal Deficits] : no focal deficits [Normal Speech] : normal speech [Alert and Oriented] : alert and oriented [Normal memory] : normal memory [General Appearance - Well Developed] : well developed [Normal Appearance] : normal appearance [Well Groomed] : well groomed [General Appearance - Well Nourished] : well nourished [No Deformities] : no deformities [General Appearance - In No Acute Distress] : no acute distress [Normal Conjunctiva] : the conjunctiva exhibited no abnormalities [Normal Oral Mucosa] : normal oral mucosa [Normal Oropharynx] : normal oropharynx [Normal Jugular Venous A Waves Present] : normal jugular venous A waves present [Normal Jugular Venous V Waves Present] : normal jugular venous V waves present [No Jugular Venous Merino A Waves] : no jugular venous merino A waves [Respiration, Rhythm And Depth] : normal respiratory rhythm and effort [Exaggerated Use Of Accessory Muscles For Inspiration] : no accessory muscle use [Auscultation Breath Sounds / Voice Sounds] : lungs were clear to auscultation bilaterally [Bowel Sounds] : normal bowel sounds [Abdomen Soft] : soft [Abnormal Walk] : normal gait [Nail Clubbing] : no clubbing of the fingernails [Cyanosis, Localized] : no localized cyanosis [Skin Color & Pigmentation] : normal skin color and pigmentation [Skin Turgor] : normal skin turgor [] : no rash [Oriented To Time, Place, And Person] : oriented to person, place, and time [Impaired Insight] : insight and judgment were intact [Affect] : the affect was normal [Mood] : the mood was normal [5th Left ICS - MCL] : palpated at the 5th LICS in the midclavicular line [Normal] : normal [No Precordial Heave] : no precordial heave was noted [Normal Rate] : normal [Rhythm Regular] : regular [Normal S1] : normal S1 [Normal S2] : normal S2 [I] : a grade 1 [2+] : left 2+ [No Pitting Edema] : no pitting edema present

## 2021-11-15 NOTE — REASON FOR VISIT
[CV Risk Factors and Non-Cardiac Disease] : CV risk factors and non-cardiac disease [Structural Heart and Valve Disease] : structural heart and valve disease [Hyperlipidemia] : hyperlipidemia [Follow-Up - Clinic] : a clinic follow-up of [Hypertension] : hypertension [Mitral Regurgitation] : mitral regurgitation [FreeTextEntry1] : murmur, dyspepsia

## 2021-11-16 PROBLEM — I73.9 PERIPHERAL VASCULAR DISEASE: Status: ACTIVE | Noted: 2021-11-15

## 2021-11-16 PROBLEM — I73.9 INTERMITTENT CLAUDICATION: Status: ACTIVE | Noted: 2021-09-02

## 2021-11-19 ENCOUNTER — OUTPATIENT (OUTPATIENT)
Dept: OUTPATIENT SERVICES | Facility: HOSPITAL | Age: 81
LOS: 1 days | End: 2021-11-19

## 2021-11-19 DIAGNOSIS — Z87.828 PERSONAL HISTORY OF OTHER (HEALED) PHYSICAL INJURY AND TRAUMA: Chronic | ICD-10-CM

## 2021-11-19 DIAGNOSIS — Z98.890 OTHER SPECIFIED POSTPROCEDURAL STATES: Chronic | ICD-10-CM

## 2021-11-19 DIAGNOSIS — H26.9 UNSPECIFIED CATARACT: Chronic | ICD-10-CM

## 2021-11-19 DIAGNOSIS — Z11.52 ENCOUNTER FOR SCREENING FOR COVID-19: ICD-10-CM

## 2021-11-19 LAB — SARS-COV-2 RNA SPEC QL NAA+PROBE: SIGNIFICANT CHANGE UP

## 2021-11-21 ENCOUNTER — FORM ENCOUNTER (OUTPATIENT)
Age: 81
End: 2021-11-21

## 2021-11-22 ENCOUNTER — INPATIENT (INPATIENT)
Facility: HOSPITAL | Age: 81
LOS: 1 days | Discharge: HOME | DRG: 470 | End: 2021-11-24
Attending: ORTHOPAEDIC SURGERY | Admitting: ORTHOPAEDIC SURGERY
Payer: MEDICARE

## 2021-11-22 ENCOUNTER — APPOINTMENT (OUTPATIENT)
Dept: ORTHOPEDIC SURGERY | Facility: HOSPITAL | Age: 81
End: 2021-11-22

## 2021-11-22 VITALS
DIASTOLIC BLOOD PRESSURE: 84 MMHG | HEART RATE: 85 BPM | RESPIRATION RATE: 18 BRPM | TEMPERATURE: 98 F | HEIGHT: 66.93 IN | OXYGEN SATURATION: 97 % | WEIGHT: 156.09 LBS | SYSTOLIC BLOOD PRESSURE: 133 MMHG

## 2021-11-22 DIAGNOSIS — Z98.890 OTHER SPECIFIED POSTPROCEDURAL STATES: Chronic | ICD-10-CM

## 2021-11-22 DIAGNOSIS — H26.9 UNSPECIFIED CATARACT: Chronic | ICD-10-CM

## 2021-11-22 DIAGNOSIS — Z87.828 PERSONAL HISTORY OF OTHER (HEALED) PHYSICAL INJURY AND TRAUMA: Chronic | ICD-10-CM

## 2021-11-22 DIAGNOSIS — M16.12 UNILATERAL PRIMARY OSTEOARTHRITIS, LEFT HIP: ICD-10-CM

## 2021-11-22 LAB — RH IG SCN BLD-IMP: POSITIVE — SIGNIFICANT CHANGE UP

## 2021-11-22 PROCEDURE — 72170 X-RAY EXAM OF PELVIS: CPT | Mod: 26

## 2021-11-22 PROCEDURE — 27130 TOTAL HIP ARTHROPLASTY: CPT | Mod: LT

## 2021-11-22 RX ORDER — PANTOPRAZOLE SODIUM 20 MG/1
40 TABLET, DELAYED RELEASE ORAL ONCE
Refills: 0 | Status: COMPLETED | OUTPATIENT
Start: 2021-11-22 | End: 2021-11-22

## 2021-11-22 RX ORDER — ACETAMINOPHEN 500 MG
975 TABLET ORAL EVERY 8 HOURS
Refills: 0 | Status: DISCONTINUED | OUTPATIENT
Start: 2021-11-23 | End: 2021-11-24

## 2021-11-22 RX ORDER — VANCOMYCIN HCL 1 G
1000 VIAL (EA) INTRAVENOUS ONCE
Refills: 0 | Status: COMPLETED | OUTPATIENT
Start: 2021-11-22 | End: 2021-11-22

## 2021-11-22 RX ORDER — SENNA PLUS 8.6 MG/1
2 TABLET ORAL AT BEDTIME
Refills: 0 | Status: DISCONTINUED | OUTPATIENT
Start: 2021-11-22 | End: 2021-11-24

## 2021-11-22 RX ORDER — DOXAZOSIN MESYLATE 4 MG
4 TABLET ORAL AT BEDTIME
Refills: 0 | Status: DISCONTINUED | OUTPATIENT
Start: 2021-11-22 | End: 2021-11-24

## 2021-11-22 RX ORDER — DOXAZOSIN MESYLATE 4 MG
1 TABLET ORAL
Qty: 0 | Refills: 0 | DISCHARGE

## 2021-11-22 RX ORDER — SODIUM CHLORIDE 9 MG/ML
500 INJECTION, SOLUTION INTRAVENOUS ONCE
Refills: 0 | Status: COMPLETED | OUTPATIENT
Start: 2021-11-22 | End: 2021-11-22

## 2021-11-22 RX ORDER — GENTAMICIN SULFATE 40 MG/ML
80 VIAL (ML) INJECTION ONCE
Refills: 0 | Status: DISCONTINUED | OUTPATIENT
Start: 2021-11-22 | End: 2021-11-22

## 2021-11-22 RX ORDER — ONDANSETRON 8 MG/1
4 TABLET, FILM COATED ORAL EVERY 6 HOURS
Refills: 0 | Status: DISCONTINUED | OUTPATIENT
Start: 2021-11-22 | End: 2021-11-24

## 2021-11-22 RX ORDER — LISINOPRIL 2.5 MG/1
20 TABLET ORAL DAILY
Refills: 0 | Status: DISCONTINUED | OUTPATIENT
Start: 2021-11-22 | End: 2021-11-24

## 2021-11-22 RX ORDER — POLYETHYLENE GLYCOL 3350 17 G/17G
17 POWDER, FOR SOLUTION ORAL AT BEDTIME
Refills: 0 | Status: DISCONTINUED | OUTPATIENT
Start: 2021-11-22 | End: 2021-11-24

## 2021-11-22 RX ORDER — ACETAMINOPHEN 500 MG
1000 TABLET ORAL ONCE
Refills: 0 | Status: COMPLETED | OUTPATIENT
Start: 2021-11-22 | End: 2021-11-22

## 2021-11-22 RX ORDER — SODIUM CHLORIDE 9 MG/ML
100 INJECTION, SOLUTION INTRAVENOUS
Refills: 0 | Status: DISCONTINUED | OUTPATIENT
Start: 2021-11-22 | End: 2021-11-24

## 2021-11-22 RX ORDER — TRAMADOL HYDROCHLORIDE 50 MG/1
50 TABLET ORAL EVERY 4 HOURS
Refills: 0 | Status: DISCONTINUED | OUTPATIENT
Start: 2021-11-22 | End: 2021-11-24

## 2021-11-22 RX ORDER — ACETAMINOPHEN 500 MG
1000 TABLET ORAL ONCE
Refills: 0 | Status: DISCONTINUED | OUTPATIENT
Start: 2021-11-22 | End: 2021-11-22

## 2021-11-22 RX ORDER — RAMIPRIL 5 MG
0 CAPSULE ORAL
Qty: 0 | Refills: 0 | DISCHARGE

## 2021-11-22 RX ORDER — SODIUM CHLORIDE 9 MG/ML
1000 INJECTION, SOLUTION INTRAVENOUS
Refills: 0 | Status: DISCONTINUED | OUTPATIENT
Start: 2021-11-22 | End: 2021-11-22

## 2021-11-22 RX ORDER — SODIUM CHLORIDE 9 MG/ML
3 INJECTION INTRAMUSCULAR; INTRAVENOUS; SUBCUTANEOUS EVERY 8 HOURS
Refills: 0 | Status: DISCONTINUED | OUTPATIENT
Start: 2021-11-22 | End: 2021-11-22

## 2021-11-22 RX ORDER — OMEGA-3 ACID ETHYL ESTERS 1 G
1 CAPSULE ORAL
Qty: 0 | Refills: 0 | DISCHARGE

## 2021-11-22 RX ORDER — ACETAMINOPHEN 500 MG
975 TABLET ORAL EVERY 8 HOURS
Refills: 0 | Status: DISCONTINUED | OUTPATIENT
Start: 2021-11-22 | End: 2021-11-22

## 2021-11-22 RX ORDER — SODIUM CHLORIDE 9 MG/ML
500 INJECTION, SOLUTION INTRAVENOUS ONCE
Refills: 0 | Status: COMPLETED | OUTPATIENT
Start: 2021-11-22 | End: 2021-11-23

## 2021-11-22 RX ORDER — LEVOTHYROXINE SODIUM 125 MCG
1 TABLET ORAL
Qty: 0 | Refills: 0 | DISCHARGE

## 2021-11-22 RX ORDER — DEXAMETHASONE 0.5 MG/5ML
8 ELIXIR ORAL ONCE
Refills: 0 | Status: DISCONTINUED | OUTPATIENT
Start: 2021-11-23 | End: 2021-11-23

## 2021-11-22 RX ORDER — OXYCODONE HYDROCHLORIDE 5 MG/1
5 TABLET ORAL EVERY 4 HOURS
Refills: 0 | Status: DISCONTINUED | OUTPATIENT
Start: 2021-11-22 | End: 2021-11-24

## 2021-11-22 RX ORDER — OXYCODONE HYDROCHLORIDE 5 MG/1
5 TABLET ORAL ONCE
Refills: 0 | Status: DISCONTINUED | OUTPATIENT
Start: 2021-11-22 | End: 2021-11-22

## 2021-11-22 RX ORDER — TRAMADOL HYDROCHLORIDE 50 MG/1
100 TABLET ORAL EVERY 4 HOURS
Refills: 0 | Status: DISCONTINUED | OUTPATIENT
Start: 2021-11-22 | End: 2021-11-24

## 2021-11-22 RX ORDER — LIDOCAINE HCL 20 MG/ML
0.2 VIAL (ML) INJECTION ONCE
Refills: 0 | Status: DISCONTINUED | OUTPATIENT
Start: 2021-11-22 | End: 2021-11-22

## 2021-11-22 RX ORDER — KETOROLAC TROMETHAMINE 30 MG/ML
15 SYRINGE (ML) INJECTION EVERY 6 HOURS
Refills: 0 | Status: DISCONTINUED | OUTPATIENT
Start: 2021-11-22 | End: 2021-11-23

## 2021-11-22 RX ORDER — PANTOPRAZOLE SODIUM 20 MG/1
40 TABLET, DELAYED RELEASE ORAL
Refills: 0 | Status: DISCONTINUED | OUTPATIENT
Start: 2021-11-22 | End: 2021-11-24

## 2021-11-22 RX ORDER — CEFAZOLIN SODIUM 1 G
2000 VIAL (EA) INJECTION EVERY 8 HOURS
Refills: 0 | Status: DISCONTINUED | OUTPATIENT
Start: 2021-11-22 | End: 2021-11-22

## 2021-11-22 RX ORDER — CEFAZOLIN SODIUM 1 G
2000 VIAL (EA) INJECTION EVERY 8 HOURS
Refills: 0 | Status: COMPLETED | OUTPATIENT
Start: 2021-11-22 | End: 2021-11-22

## 2021-11-22 RX ORDER — TRAMADOL HYDROCHLORIDE 50 MG/1
50 TABLET ORAL ONCE
Refills: 0 | Status: DISCONTINUED | OUTPATIENT
Start: 2021-11-22 | End: 2021-11-22

## 2021-11-22 RX ORDER — HYDROMORPHONE HYDROCHLORIDE 2 MG/ML
0.25 INJECTION INTRAMUSCULAR; INTRAVENOUS; SUBCUTANEOUS
Refills: 0 | Status: DISCONTINUED | OUTPATIENT
Start: 2021-11-22 | End: 2021-11-22

## 2021-11-22 RX ORDER — CHLORHEXIDINE GLUCONATE 213 G/1000ML
1 SOLUTION TOPICAL ONCE
Refills: 0 | Status: DISCONTINUED | OUTPATIENT
Start: 2021-11-22 | End: 2021-11-22

## 2021-11-22 RX ORDER — CELECOXIB 200 MG/1
100 CAPSULE ORAL EVERY 12 HOURS
Refills: 0 | Status: DISCONTINUED | OUTPATIENT
Start: 2021-11-23 | End: 2021-11-24

## 2021-11-22 RX ORDER — INFLUENZA VIRUS VACCINE 15; 15; 15; 15 UG/.5ML; UG/.5ML; UG/.5ML; UG/.5ML
0.7 SUSPENSION INTRAMUSCULAR ONCE
Refills: 0 | Status: DISCONTINUED | OUTPATIENT
Start: 2021-11-22 | End: 2021-11-24

## 2021-11-22 RX ORDER — LEVOTHYROXINE SODIUM 125 MCG
75 TABLET ORAL DAILY
Refills: 0 | Status: DISCONTINUED | OUTPATIENT
Start: 2021-11-22 | End: 2021-11-24

## 2021-11-22 RX ORDER — CHOLECALCIFEROL (VITAMIN D3) 125 MCG
1 CAPSULE ORAL
Qty: 0 | Refills: 0 | DISCHARGE

## 2021-11-22 RX ORDER — ASPIRIN/CALCIUM CARB/MAGNESIUM 324 MG
81 TABLET ORAL
Refills: 0 | Status: DISCONTINUED | OUTPATIENT
Start: 2021-11-22 | End: 2021-11-24

## 2021-11-22 RX ADMIN — Medication 81 MILLIGRAM(S): at 17:52

## 2021-11-22 RX ADMIN — Medication 15 MILLIGRAM(S): at 22:20

## 2021-11-22 RX ADMIN — Medication 1000 MILLIGRAM(S): at 22:20

## 2021-11-22 RX ADMIN — Medication 15 MILLIGRAM(S): at 16:45

## 2021-11-22 RX ADMIN — Medication 400 MILLIGRAM(S): at 17:52

## 2021-11-22 RX ADMIN — Medication 1000 MILLIGRAM(S): at 18:05

## 2021-11-22 RX ADMIN — Medication 100 MILLIGRAM(S): at 23:53

## 2021-11-22 RX ADMIN — Medication 250 MILLIGRAM(S): at 07:36

## 2021-11-22 RX ADMIN — SODIUM CHLORIDE 500 MILLILITER(S): 9 INJECTION, SOLUTION INTRAVENOUS at 11:15

## 2021-11-22 RX ADMIN — Medication 4 MILLIGRAM(S): at 22:12

## 2021-11-22 RX ADMIN — Medication 15 MILLIGRAM(S): at 16:28

## 2021-11-22 RX ADMIN — SODIUM CHLORIDE 150 MILLILITER(S): 9 INJECTION, SOLUTION INTRAVENOUS at 11:15

## 2021-11-22 RX ADMIN — TRAMADOL HYDROCHLORIDE 50 MILLIGRAM(S): 50 TABLET ORAL at 07:36

## 2021-11-22 RX ADMIN — Medication 400 MILLIGRAM(S): at 22:12

## 2021-11-22 RX ADMIN — SODIUM CHLORIDE 500 MILLILITER(S): 9 INJECTION, SOLUTION INTRAVENOUS at 14:49

## 2021-11-22 RX ADMIN — SODIUM CHLORIDE 150 MILLILITER(S): 9 INJECTION, SOLUTION INTRAVENOUS at 14:51

## 2021-11-22 RX ADMIN — PANTOPRAZOLE SODIUM 40 MILLIGRAM(S): 20 TABLET, DELAYED RELEASE ORAL at 07:37

## 2021-11-22 RX ADMIN — Medication 150 MILLIGRAM(S): at 07:36

## 2021-11-22 RX ADMIN — Medication 100 MILLIGRAM(S): at 16:26

## 2021-11-22 RX ADMIN — Medication 15 MILLIGRAM(S): at 22:12

## 2021-11-22 NOTE — PRE-ANESTHESIA EVALUATION ADULT - LAST ECHOCARDIOGRAM
Sept 2021- EF 66%, mild moderate mitral regurgitation, mild- moderate tricupsid regurg, mild pulmonic regur, borderline pHTN

## 2021-11-22 NOTE — CHART NOTE - NSCHARTNOTEFT_GEN_A_CORE
POC    81 year old male s/p Left Total Hip replacement. Patient was seen at bedside lying comfortably and in no apparent distress. Patient has appropriate incisional site discomfort and states sensation has not returned to his left LE due to anesthesia block.    ICU Vital Signs Last 24 Hrs  T(C): 36.4 (22 Nov 2021 14:10), Max: 36.6 (22 Nov 2021 06:59)  T(F): 97.6 (22 Nov 2021 14:10), Max: 97.9 (22 Nov 2021 06:59)  HR: 78 (22 Nov 2021 14:10) (61 - 85)  BP: 103/64 (22 Nov 2021 14:10) (96/57 - 133/84)  BP(mean): 79 (22 Nov 2021 13:45) (75 - 85)  RR: 18 (22 Nov 2021 14:10) (16 - 18)  SpO2: 98% (22 Nov 2021 14:10) (93% - 99%)      PE:  General: NAD, alert and oriented   Cardio: +S1, +S2, RRR, no murmurs, gallops, rubs appreciated   Pulm: Symmetric breath sounds bilaterally, CTAB anterior fields  Abdomen: soft, +bowel sounds x4, NDNT, no guarding/ rebound tenderness  Ext: LLE   Surgical site incision covered with Aquacel C/D/I  decreased sensation/movement in LLE secondary to anesthesia block  +2 DP/PT  digits warm, dry, well perfused  no edema noted      from: Xray Pelvis AP only (11.22.21 @ 10:44)    Impression: There is an interval left total hip arthroplasty with press-fit components in the usual position. There is soft tissue swelling and air consistent with recent operative change. There is mild right hip arthrosis. Surgical clips project over the pelvis.      Assessment:    81 year old male s/p Left Total Hip replacement    Plan:  -PT: LLE WBAT  -serial Neurovascular checks  -f/u AM labs: CBC/ BMP  -Pain Control: continue Tylenol 100mg IV bolus x2  continue Tylenol 975mg oral every 8 hours  continue Toradol 15mg injectable for 24 hours  continue tramadol 50mg oral every 4 hours PRN for mild pain   continue tramadol 100mg oral every 4 hours PRN for moderate pain  continue oxycodone 5mg oral every 4 hours PRN for severe pain  start celecoxib 100mg oral every 12 hours POD#2   -DVT Prophylaxis: continue aspirin 81mg BID  -Continue IVF  -monitor VS  -Dispo: anticipate d/c home        Shauna Fuller PA-S  Pager: [phone]

## 2021-11-22 NOTE — PHYSICAL THERAPY INITIAL EVALUATION ADULT - PLANNED THERAPY INTERVENTIONS, PT EVAL
GOAL: pt will negotiate 1 flight of steps +UHR independently in 2 weeks./balance training/bed mobility training/gait training/strengthening/transfer training

## 2021-11-22 NOTE — PHYSICAL THERAPY INITIAL EVALUATION ADULT - PERTINENT HX OF CURRENT PROBLEM, REHAB EVAL
This is an 81 year old male with past medical history of HTN, Murmur, HLD, hypothyroidism, SOHAIL (uses CPAP @ HS), and BPH. Reports having left sided achy/dull hip pain has tried non- surgical managment with physical therapy and received cortisone injection in August 2021. States pain is now interfering with daily activities  Denies any assistive devices during ambulation. Pt is now s/p L RAMOS

## 2021-11-22 NOTE — PHYSICAL THERAPY INITIAL EVALUATION ADULT - ADDITIONAL COMMENTS
PTA pt was independent with functional mobility and ADLs. Pt lives in a  alone, however states he intially planned to stay with his brother following surgery where he'd have to negotiate 13 steps however pt stating now brother is away until friday.

## 2021-11-23 ENCOUNTER — TRANSCRIPTION ENCOUNTER (OUTPATIENT)
Age: 81
End: 2021-11-23

## 2021-11-23 LAB
ANION GAP SERPL CALC-SCNC: 12 MMOL/L — SIGNIFICANT CHANGE UP (ref 5–17)
BUN SERPL-MCNC: 17 MG/DL — SIGNIFICANT CHANGE UP (ref 7–23)
CALCIUM SERPL-MCNC: 9 MG/DL — SIGNIFICANT CHANGE UP (ref 8.4–10.5)
CHLORIDE SERPL-SCNC: 105 MMOL/L — SIGNIFICANT CHANGE UP (ref 96–108)
CO2 SERPL-SCNC: 24 MMOL/L — SIGNIFICANT CHANGE UP (ref 22–31)
CREAT SERPL-MCNC: 0.94 MG/DL — SIGNIFICANT CHANGE UP (ref 0.5–1.3)
GLUCOSE SERPL-MCNC: 94 MG/DL — SIGNIFICANT CHANGE UP (ref 70–99)
HCT VFR BLD CALC: 39.4 % — SIGNIFICANT CHANGE UP (ref 39–50)
HGB BLD-MCNC: 12.7 G/DL — LOW (ref 13–17)
MCHC RBC-ENTMCNC: 28.7 PG — SIGNIFICANT CHANGE UP (ref 27–34)
MCHC RBC-ENTMCNC: 32.2 GM/DL — SIGNIFICANT CHANGE UP (ref 32–36)
MCV RBC AUTO: 89.1 FL — SIGNIFICANT CHANGE UP (ref 80–100)
NRBC # BLD: 0 /100 WBCS — SIGNIFICANT CHANGE UP (ref 0–0)
PLATELET # BLD AUTO: 196 K/UL — SIGNIFICANT CHANGE UP (ref 150–400)
POTASSIUM SERPL-MCNC: 3.9 MMOL/L — SIGNIFICANT CHANGE UP (ref 3.5–5.3)
POTASSIUM SERPL-SCNC: 3.9 MMOL/L — SIGNIFICANT CHANGE UP (ref 3.5–5.3)
RBC # BLD: 4.42 M/UL — SIGNIFICANT CHANGE UP (ref 4.2–5.8)
RBC # FLD: 13.9 % — SIGNIFICANT CHANGE UP (ref 10.3–14.5)
SODIUM SERPL-SCNC: 141 MMOL/L — SIGNIFICANT CHANGE UP (ref 135–145)
WBC # BLD: 11.09 K/UL — HIGH (ref 3.8–10.5)
WBC # FLD AUTO: 11.09 K/UL — HIGH (ref 3.8–10.5)

## 2021-11-23 RX ORDER — DEXAMETHASONE 0.5 MG/5ML
8 ELIXIR ORAL ONCE
Refills: 0 | Status: COMPLETED | OUTPATIENT
Start: 2021-11-23 | End: 2021-11-23

## 2021-11-23 RX ADMIN — Medication 975 MILLIGRAM(S): at 22:14

## 2021-11-23 RX ADMIN — CELECOXIB 100 MILLIGRAM(S): 200 CAPSULE ORAL at 17:31

## 2021-11-23 RX ADMIN — Medication 15 MILLIGRAM(S): at 11:24

## 2021-11-23 RX ADMIN — Medication 81 MILLIGRAM(S): at 17:31

## 2021-11-23 RX ADMIN — Medication 975 MILLIGRAM(S): at 05:23

## 2021-11-23 RX ADMIN — LISINOPRIL 20 MILLIGRAM(S): 2.5 TABLET ORAL at 05:24

## 2021-11-23 RX ADMIN — Medication 4 MILLIGRAM(S): at 22:15

## 2021-11-23 RX ADMIN — Medication 15 MILLIGRAM(S): at 04:42

## 2021-11-23 RX ADMIN — Medication 101.6 MILLIGRAM(S): at 05:24

## 2021-11-23 RX ADMIN — Medication 75 MICROGRAM(S): at 05:23

## 2021-11-23 RX ADMIN — PANTOPRAZOLE SODIUM 40 MILLIGRAM(S): 20 TABLET, DELAYED RELEASE ORAL at 05:24

## 2021-11-23 RX ADMIN — Medication 15 MILLIGRAM(S): at 10:54

## 2021-11-23 RX ADMIN — Medication 975 MILLIGRAM(S): at 14:30

## 2021-11-23 RX ADMIN — Medication 975 MILLIGRAM(S): at 14:00

## 2021-11-23 RX ADMIN — Medication 975 MILLIGRAM(S): at 22:44

## 2021-11-23 RX ADMIN — Medication 15 MILLIGRAM(S): at 04:49

## 2021-11-23 RX ADMIN — SODIUM CHLORIDE 500 MILLILITER(S): 9 INJECTION, SOLUTION INTRAVENOUS at 06:04

## 2021-11-23 RX ADMIN — CELECOXIB 100 MILLIGRAM(S): 200 CAPSULE ORAL at 18:01

## 2021-11-23 RX ADMIN — Medication 81 MILLIGRAM(S): at 05:23

## 2021-11-23 NOTE — DISCHARGE NOTE PROVIDER - NSDCFUADDINST_GEN_ALL_CORE_FT
Keep dressing clean and dry.  Dressing to be removed as per instructions on dressing if no instructions then it will be removed at follow up visit.    Physical therapy for ambulation WBAT    Ecotrin 81mg po 2x/day x 4 weeks to prevent clots

## 2021-11-23 NOTE — PROGRESS NOTE ADULT - ASSESSMENT
S/P L THR    Plan    ck labs  OOB/PT/WBAT  Ecotrin for DVT prophylaxis       Loni Fitzgerald PA-C   Beeper    [phone]

## 2021-11-23 NOTE — DISCHARGE NOTE PROVIDER - NSDCMRMEDTOKEN_GEN_ALL_CORE_FT
aspirin 81 mg oral tablet, chewable: 1 tab(s) orally once a day  doxazosin 4 mg oral tablet: 1 tab(s) orally once a day (at bedtime)  Fish Oil 1000 mg oral capsule: 1 cap(s) orally once a day  PriLOSEC OTC 20 mg oral delayed release tablet: 1 tab(s) orally once a day  ramipril 5 mg oral tablet: orally once a day  Synthroid 75 mcg (0.075 mg) oral tablet: 1 tab(s) orally once a day  Vitamin D3 50 mcg (2000 intl units) oral tablet: 1 tab(s) orally once a day   acetaminophen 325 mg oral tablet: 3 tabs orally every 6 hours X 5 days, then as needed for mild pain  celecoxib 100 mg oral capsule: 1 cap(s) orally every 12 hours  doxazosin 4 mg oral tablet: 1 tab(s) orally once a day (at bedtime)  Fish Oil 1000 mg oral capsule: 1 cap(s) orally once a day  oxyCODONE 5 mg oral tablet: 1 tab(s) orally every 4 hours, As needed, Severe Pain (7 - 10)  PriLOSEC OTC 20 mg oral delayed release tablet: 1 tab(s) orally once a day  ramipril 5 mg oral tablet: orally once a day  senna oral tablet: 2 tab(s) orally once a day (at bedtime)  Synthroid 75 mcg (0.075 mg) oral tablet: 1 tab(s) orally once a day  traMADol 50 mg oral tablet: 1 tab(s) orally every 4 hours, As needed, Mild Pain (1 - 3)  Vitamin D3 50 mcg (2000 intl units) oral tablet: 1 tab(s) orally once a day   acetaminophen 325 mg oral tablet: 3 tabs orally every 6 hours X 5 days, then as needed for mild pain  doxazosin 4 mg oral tablet: 1 tab(s) orally once a day (at bedtime)  Fish Oil 1000 mg oral capsule: 1 cap(s) orally once a day  oxyCODONE 5 mg oral tablet: 1 tab(s) orally every 4 hours, As needed, Severe Pain (7 - 10)  ramipril 5 mg oral tablet: orally once a day  Synthroid 75 mcg (0.075 mg) oral tablet: 1 tab(s) orally once a day  Vitamin D3 50 mcg (2000 intl units) oral tablet: 1 tab(s) orally once a day   acetaminophen 325 mg oral tablet: 3 tabs orally every 6 hours X 5 days, then as needed for mild pain  doxazosin 4 mg oral tablet: 1 tab(s) orally once a day (at bedtime)  Fish Oil 1000 mg oral capsule: 1 cap(s) orally once a day  ramipril 5 mg oral tablet: orally once a day  Synthroid 75 mcg (0.075 mg) oral tablet: 1 tab(s) orally once a day  Vitamin D3 50 mcg (2000 intl units) oral tablet: 1 tab(s) orally once a day

## 2021-11-23 NOTE — DISCHARGE NOTE PROVIDER - CARE PROVIDER_API CALL
Eliseo Larose)  Orthopaedic Surgery  [address], [address]  [place] [address]  Phone: [phone]  Fax: [phone]  Follow Up Time:

## 2021-11-23 NOTE — PROGRESS NOTE ADULT - SUBJECTIVE AND OBJECTIVE BOX
Patient is a 81y old  Male who presents with a chief complaint of " I'm having a hip replacement"  Goal: "To have no pain" (12 Nov 2021 15:08)      POST OPERATIVE DAY #: 1  Patient comfortable  No complaints    VS:  T(C): 37 (11-23-21 @ 04:10), Max: 37 (11-23-21 @ 04:10)  T(F): 98.6 (11-23-21 @ 04:10), Max: 98.6 (11-23-21 @ 04:10)  HR: 62 (11-23-21 @ 04:10) (61 - 86)  BP: 107/64 (11-23-21 @ 04:10) (96/57 - 133/84)  RR: 18 (11-23-21 @ 04:10) (16 - 18)  SpO2: 94% (11-23-21 @ 04:10) (93% - 99%)  Wt(kg): --      PHYSICAL EXAM:  NAD, Alert  EXT:   Lt Hip: Aquacel Dressing C/D/I  No Calf Tenderness  (+) DF/PF; (+) Distal Pulses;  Sensation: No gross deficits noted  Pulses 2+   B/L, PAS

## 2021-11-23 NOTE — OCCUPATIONAL THERAPY INITIAL EVALUATION ADULT - LIVES WITH, PROFILE
pt lives alone, independent at baseline, pt plans to d/c to his brothers house where there is a tub and standard toilets.

## 2021-11-23 NOTE — CONSULT NOTE ADULT - ATTENDING COMMENTS
81 year old male with past medical history of HTN, Murmur, HLD, hypothyroidism, SOHAIL (uses CPAP @ HS), and BPH    s/p L THR 11/22  care per ortho team  Pain mgmt  Bowel regimen  Incentive spirometry   PT/OT eval  dvt ppx    will update DR Lopez- [facility]      Dr Ray will be covering  starting 11/24/21  please call [facility] @ [phone] for questions or concerns

## 2021-11-23 NOTE — OCCUPATIONAL THERAPY INITIAL EVALUATION ADULT - RANGE OF MOTION EXAMINATION, LOWER EXTREMITY
Left LE Active ROM was WFL (within functional limits)/Right LE Active ROM was WNL(within normal limits)

## 2021-11-23 NOTE — CONSULT NOTE ADULT - SUBJECTIVE AND OBJECTIVE BOX
Patient is a 81y old  Male who presents with a chief complaint of " I'm having a hip replacement"  Goal: "To have no pain" (12 Nov 2021 15:08)      HPI:  This is an 81 year old male with past medical history of HTN, Murmur, HLD, hypothyroidism, SOHAIL (uses CPAP @ HS), and BPH      Reports having left sided achy/dull hip pain has tried non- surgical managment with physical therapy and received cortisone injection in August 2021. States pain is now interfering with daily activities  Denies any assistive devices during ambulation. [facility]ing to [facility] for scheduled left total hip arthroplasty, direct anterior approach on 11/22/21 with Dr. Larose    ** Covid test on 11/19/21  ** Denies any history of COVID 19 infection  ** Covid Vaccinated - Barry 5/2021 (12 Nov 2021 15:08)      PAST MEDICAL & SURGICAL HISTORY:  Hypothyroidism    HTN (hypertension)    SOHAIL (obstructive sleep apnea)    History of BPH    S/P urological surgery  URO lift    Right cataract    History of torn meniscus of knee  20 years ago        FAMILY HISTORY:      SOCIAL HISTORY:    Allergies    penicillin (Rash; Urticaria)  tetracycline (Angioedema)    Intolerances          REVIEW OF SYSTEMS:  General: no weakness, no fever/chills, no weight loss/gain  Skin/Breast: no rash, no jaundice  Ophthalmologic: no vision changes, no dry eyes   Respiratory and Thorax: no cough, no wheezing, no hemoptysis, no dyspnea  Cardiovascular: no chest pain, no shortness of breath, no orthopnea  Gastrointestinal: no n/v/d, no abdominal pain, no dysphagia   Genitourinary: no dysuria, no frequency, no nocturia, no hematuria  Musculoskeletal: no trauma, no sprain/strain, no myalgias, no arthralgias, no fracture  Neurological: no HA, no dizziness, no weakness, no numbness  Psychiatric: no depression, no SI/HI  Hematology/Lymphatics: no easy bruising  Endocrine: no heat or cold intolerance. no weight gain or loss  Allergic/Immunologic: no allergy or recent reaction     SUBJECTIVE / OVERNIGHT EVENTS:    s/p L THR  no events overnight  patient seen and examined  OOB in chair   no pain when working with PT/OT  complains of left quad pain when lifting leg up    Vital Signs Last 24 Hrs  T(C): 36.6 (23 Nov 2021 09:04), Max: 37 (23 Nov 2021 04:10)  T(F): 97.8 (23 Nov 2021 09:04), Max: 98.6 (23 Nov 2021 04:10)  HR: 67 (23 Nov 2021 09:04) (61 - 86)  BP: 123/69 (23 Nov 2021 09:04) (96/57 - 123/69)  BP(mean): 79 (22 Nov 2021 13:45) (75 - 85)  RR: 18 (23 Nov 2021 09:04) (16 - 18)  SpO2: 95% (23 Nov 2021 09:04) (93% - 99%)  I&O's Summary    22 Nov 2021 07:01  -  23 Nov 2021 07:00  --------------------------------------------------------  IN: 5130 mL / OUT: 1050 mL / NET: 4080 mL    23 Nov 2021 07:01  -  23 Nov 2021 09:34  --------------------------------------------------------  IN: 0 mL / OUT: 200 mL / NET: -200 mL        PHYSICAL EXAM:  GENERAL: NAD, Comfortable  HEAD:  Atraumatic, Normocephalic  EYES: EOMI, PERRLA, conjunctiva and sclera clear  NECK: Supple, No JVD  CHEST/LUNG: Clear to auscultation bilaterally; No wheeze  HEART: Regular rate and rhythm; No murmurs, rubs, or gallops  ABDOMEN: Soft, Nontender, Nondistended; Bowel sounds present  NEURO: AAOx3, no focal deficit, 5/5 b/l extremities  EXTREMITIES:  2+ Peripheral Pulses, No clubbing, cyanosis, or edema, left hip Aquacel dsg c/d/i  SKIN: No rashes or lesions    LABS:                        12.7   11.09 )-----------( 196      ( 23 Nov 2021 06:41 )             39.4     11-23    141  |  105  |  17  ----------------------------<  94  3.9   |  24  |  0.94    Ca    9.0      23 Nov 2021 06:39        CAPILLARY BLOOD GLUCOSE                RADIOLOGY & ADDITIONAL TESTS:    Imaging Personally Reviewed:  [x] YES  [ ] NO    Consultant(s) Notes Reviewed:  [x] YES  [ ] NO      MEDICATIONS  (STANDING):  acetaminophen     Tablet .. 975 milliGRAM(s) Oral every 8 hours  aspirin 81 milliGRAM(s) Oral two times a day  celecoxib 100 milliGRAM(s) Oral every 12 hours  doxazosin 4 milliGRAM(s) Oral at bedtime  influenza  Vaccine (HIGH DOSE) 0.7 milliLiter(s) IntraMuscular once  ketorolac   Injectable 15 milliGRAM(s) IV Push every 6 hours  lactated ringers. 100 milliLiter(s) (150 mL/Hr) IV Continuous <Continuous>  levothyroxine 75 MICROGram(s) Oral daily  lisinopril 20 milliGRAM(s) Oral daily  pantoprazole    Tablet 40 milliGRAM(s) Oral before breakfast  polyethylene glycol 3350 17 Gram(s) Oral at bedtime  senna 2 Tablet(s) Oral at bedtime    MEDICATIONS  (PRN):  ondansetron Injectable 4 milliGRAM(s) IV Push every 6 hours PRN Nausea and/or Vomiting  oxyCODONE    IR 5 milliGRAM(s) Oral every 4 hours PRN Severe Pain (7 - 10)  traMADol 50 milliGRAM(s) Oral every 4 hours PRN Mild Pain (1 - 3)  traMADol 100 milliGRAM(s) Oral every 4 hours PRN Moderate Pain (4 - 6)      Care Discussed with Consultants/Other Providers [x] YES  [ ] NO

## 2021-11-23 NOTE — CONSULT NOTE ADULT - ASSESSMENT
Patient is a 81y old  Male who presents with a chief complaint of " I'm having a hip replacement"  Goal: "To have no pain" (12 Nov 2021 15:08)      HPI:  This is an 81 year old male with past medical history of HTN, Murmur, HLD, hypothyroidism, SOHAIL (uses CPAP @ HS), and BPH    s/p L THR:  Incision care per ortho  Pain mgmt  Bowel regimen  Incentive spirometry  PT/OT ongoing  Neuro checks  Celebrex  Advance diet as tolerated  Care per Ortho    BPH:  Cont Cardura    HLD/HTN:   No active chest pain   Cont home CV meds    Hypothyroidism  Cont Synthroid     GI ppx:  Protonix     DVT ppx:  SCD  ASA BID    [facility]  [phone]

## 2021-11-23 NOTE — DISCHARGE NOTE PROVIDER - HOSPITAL COURSE
History of Present Illness    This is an 81 year old male with past medical history of HTN, Murmur, HLD, hypothyroidism, SOHAIL (uses CPAP @ HS), and BPH      Reports having left sided achy/dull hip pain has tried non- surgical managment with physical therapy and received cortisone injection in August 2021. States pain is now interfering with daily activities  Denies any assistive devices during ambulation. Presenting to [facility] for scheduled left total hip arthroplasty, direct anterior approach on 11/22/21 with Dr. Larose    Admitted for elective surgery on 11/22/21.  S/P L THR.  Patient tolerated procedure well.  Physical therapy for ambulation WBAT.  PT recommended home with home PT.  Will discharge when cleared. History of Present Illness    This is an 81 year old male with past medical history of HTN, Murmur, HLD, hypothyroidism, SOHAIL (uses CPAP @ HS), and BPH      Reports having left sided achy/dull hip pain has tried non- surgical managment with physical therapy and received cortisone injection in August 2021. States pain is now interfering with daily activities  Denies any assistive devices during ambulation. Presenting to [facility] for scheduled left total hip arthroplasty, direct anterior approach on 11/22/21 with Dr. Larose    Hospital Course:  Admitted for elective surgery on 11/22/21.  S/P L THR.  Patient tolerated procedure well.  Physical therapy for ambulation WBAT.  PT recommended home with home PT.  Will discharge when cleared by physical therapy.

## 2021-11-24 ENCOUNTER — NON-APPOINTMENT (OUTPATIENT)
Age: 81
End: 2021-11-24

## 2021-11-24 ENCOUNTER — TRANSCRIPTION ENCOUNTER (OUTPATIENT)
Age: 81
End: 2021-11-24

## 2021-11-24 VITALS
OXYGEN SATURATION: 98 % | RESPIRATION RATE: 18 BRPM | DIASTOLIC BLOOD PRESSURE: 71 MMHG | SYSTOLIC BLOOD PRESSURE: 111 MMHG | TEMPERATURE: 98 F | HEART RATE: 83 BPM

## 2021-11-24 PROCEDURE — 36415 COLL VENOUS BLD VENIPUNCTURE: CPT

## 2021-11-24 PROCEDURE — 82962 GLUCOSE BLOOD TEST: CPT

## 2021-11-24 PROCEDURE — C1776: CPT

## 2021-11-24 PROCEDURE — 97530 THERAPEUTIC ACTIVITIES: CPT

## 2021-11-24 PROCEDURE — 97535 SELF CARE MNGMENT TRAINING: CPT

## 2021-11-24 PROCEDURE — U0003: CPT

## 2021-11-24 PROCEDURE — 97166 OT EVAL MOD COMPLEX 45 MIN: CPT

## 2021-11-24 PROCEDURE — 80048 BASIC METABOLIC PNL TOTAL CA: CPT

## 2021-11-24 PROCEDURE — 72170 X-RAY EXAM OF PELVIS: CPT

## 2021-11-24 PROCEDURE — 97116 GAIT TRAINING THERAPY: CPT

## 2021-11-24 PROCEDURE — 85027 COMPLETE CBC AUTOMATED: CPT

## 2021-11-24 PROCEDURE — U0005: CPT

## 2021-11-24 PROCEDURE — 97161 PT EVAL LOW COMPLEX 20 MIN: CPT

## 2021-11-24 PROCEDURE — C9803: CPT

## 2021-11-24 RX ORDER — OXYCODONE HYDROCHLORIDE 5 MG/1
1 TABLET ORAL
Qty: 28 | Refills: 0
Start: 2021-11-24 | End: 2021-11-30

## 2021-11-24 RX ORDER — ACETAMINOPHEN 500 MG
3 TABLET ORAL
Qty: 0 | Refills: 0 | DISCHARGE
Start: 2021-11-24

## 2021-11-24 RX ORDER — ASPIRIN/CALCIUM CARB/MAGNESIUM 324 MG
1 TABLET ORAL
Qty: 60 | Refills: 0
Start: 2021-11-24 | End: 2021-12-23

## 2021-11-24 RX ORDER — TRAMADOL HYDROCHLORIDE 50 MG/1
2 TABLET ORAL
Qty: 56 | Refills: 0
Start: 2021-11-24 | End: 2021-11-30

## 2021-11-24 RX ORDER — SENNA PLUS 8.6 MG/1
2 TABLET ORAL
Qty: 28 | Refills: 0
Start: 2021-11-24 | End: 2021-12-07

## 2021-11-24 RX ORDER — ASPIRIN/CALCIUM CARB/MAGNESIUM 324 MG
1 TABLET ORAL
Qty: 80 | Refills: 0
Start: 2021-11-24 | End: 2022-01-02

## 2021-11-24 RX ORDER — TRAMADOL HYDROCHLORIDE 50 MG/1
1 TABLET ORAL
Qty: 0 | Refills: 0 | DISCHARGE
Start: 2021-11-24

## 2021-11-24 RX ORDER — ACETAMINOPHEN 500 MG
3 TABLET ORAL
Qty: 270 | Refills: 0
Start: 2021-11-24 | End: 2021-12-23

## 2021-11-24 RX ORDER — OMEPRAZOLE 10 MG/1
1 CAPSULE, DELAYED RELEASE ORAL
Qty: 0 | Refills: 0 | DISCHARGE

## 2021-11-24 RX ORDER — CELECOXIB 200 MG/1
1 CAPSULE ORAL
Qty: 0 | Refills: 0 | DISCHARGE
Start: 2021-11-24

## 2021-11-24 RX ORDER — TRAMADOL HYDROCHLORIDE 50 MG/1
1 TABLET ORAL
Qty: 28 | Refills: 0
Start: 2021-11-24 | End: 2021-11-30

## 2021-11-24 RX ORDER — OXYCODONE HYDROCHLORIDE 5 MG/1
1 TABLET ORAL
Qty: 42 | Refills: 0
Start: 2021-11-24 | End: 2021-11-30

## 2021-11-24 RX ORDER — OMEPRAZOLE 10 MG/1
1 CAPSULE, DELAYED RELEASE ORAL
Qty: 30 | Refills: 0
Start: 2021-11-24 | End: 2021-12-23

## 2021-11-24 RX ORDER — SENNA PLUS 8.6 MG/1
2 TABLET ORAL
Qty: 0 | Refills: 0 | DISCHARGE
Start: 2021-11-24

## 2021-11-24 RX ORDER — CELECOXIB 200 MG/1
1 CAPSULE ORAL
Qty: 28 | Refills: 0
Start: 2021-11-24 | End: 2021-12-07

## 2021-11-24 RX ORDER — OXYCODONE HYDROCHLORIDE 5 MG/1
1 TABLET ORAL
Qty: 0 | Refills: 0 | DISCHARGE
Start: 2021-11-24

## 2021-11-24 RX ORDER — ASPIRIN/CALCIUM CARB/MAGNESIUM 324 MG
1 TABLET ORAL
Qty: 0 | Refills: 0 | DISCHARGE

## 2021-11-24 RX ADMIN — Medication 975 MILLIGRAM(S): at 06:50

## 2021-11-24 RX ADMIN — LISINOPRIL 20 MILLIGRAM(S): 2.5 TABLET ORAL at 06:21

## 2021-11-24 RX ADMIN — Medication 975 MILLIGRAM(S): at 06:23

## 2021-11-24 RX ADMIN — Medication 975 MILLIGRAM(S): at 13:18

## 2021-11-24 RX ADMIN — CELECOXIB 100 MILLIGRAM(S): 200 CAPSULE ORAL at 06:50

## 2021-11-24 RX ADMIN — CELECOXIB 100 MILLIGRAM(S): 200 CAPSULE ORAL at 06:21

## 2021-11-24 RX ADMIN — PANTOPRAZOLE SODIUM 40 MILLIGRAM(S): 20 TABLET, DELAYED RELEASE ORAL at 06:20

## 2021-11-24 RX ADMIN — Medication 75 MICROGRAM(S): at 06:21

## 2021-11-24 RX ADMIN — Medication 81 MILLIGRAM(S): at 06:21

## 2021-11-24 RX ADMIN — Medication 975 MILLIGRAM(S): at 13:48

## 2021-11-24 NOTE — DISCHARGE NOTE NURSING/CASE MANAGEMENT/SOCIAL WORK - PATIENT PORTAL LINK FT
You can access the FollowMyHealth Patient Portal offered by [facility] by registering at the following website: [email or web address]. By joining [facility]’s FollowMyHealth portal, you will also be able to view your health information using other applications (apps) compatible with our system.

## 2021-11-24 NOTE — PROGRESS NOTE ADULT - ASSESSMENT
Patient is a 81y old  Male who presents with a chief complaint of " I'm having a hip replacement"  Goal: "To have no pain" (12 Nov 2021 15:08)      HPI:  This is an 81 year old male with past medical history of HTN, Murmur, HLD, hypothyroidism, SOHAIL (uses CPAP @ HS), and BPH    s/p L THR:  Incision care per ortho  Pain mgmt  Bowel regimen  Incentive spirometry  PT/OT ongoing - WBAT  Neuro checks  Celebrex  Advance diet as tolerated  Care per Ortho    BPH:  Cont Cardura    HLD/HTN:   No active chest pain   Cont home CV meds    Hypothyroidism  Cont Synthroid     GI ppx:  Protonix     DVT ppx:  SCD  ASA BID    [facility]  [phone]       81 year old male with past medical history of HTN, Murmur, HLD, hypothyroidism, SOHAIL (uses CPAP @ HS), and BPH    s/p L THR:  Incision care per ortho  Pain mgmt  Bowel regimen  Incentive spirometry  PT/OT ongoing - WBAT  Neuro checks  Celebrex  Advance diet as tolerated  Care per Ortho    BPH:  Cont Cardura    HLD/HTN:   No active chest pain   Cont home CV meds    Hypothyroidism  Cont Synthroid     GI ppx:  Protonix     DVT ppx:  SCD  ASA BID    [facility]  [phone]

## 2021-11-24 NOTE — PROGRESS NOTE ADULT - SUBJECTIVE AND OBJECTIVE BOX
SUBJECTIVE / OVERNIGHT EVENTS:    no events overnight  patient seen and examined  denies cp/sob  no n/v/d    --------------------------------------------------------------------------------------------  LABS:                        12.7   11.09 )-----------( 196      ( 23 Nov 2021 06:41 )             39.4     11-23    141  |  105  |  17  ----------------------------<  94  3.9   |  24  |  0.94    Ca    9.0      23 Nov 2021 06:39        CAPILLARY BLOOD GLUCOSE                RADIOLOGY & ADDITIONAL TESTS:    Imaging Personally Reviewed:  [x] YES  [ ] NO    Consultant(s) Notes Reviewed:  [x] YES  [ ] NO    MEDICATIONS  (STANDING):  acetaminophen     Tablet .. 975 milliGRAM(s) Oral every 8 hours  aspirin 81 milliGRAM(s) Oral two times a day  celecoxib 100 milliGRAM(s) Oral every 12 hours  doxazosin 4 milliGRAM(s) Oral at bedtime  influenza  Vaccine (HIGH DOSE) 0.7 milliLiter(s) IntraMuscular once  lactated ringers. 100 milliLiter(s) (150 mL/Hr) IV Continuous <Continuous>  levothyroxine 75 MICROGram(s) Oral daily  lisinopril 20 milliGRAM(s) Oral daily  pantoprazole    Tablet 40 milliGRAM(s) Oral before breakfast  polyethylene glycol 3350 17 Gram(s) Oral at bedtime  senna 2 Tablet(s) Oral at bedtime    MEDICATIONS  (PRN):  ondansetron Injectable 4 milliGRAM(s) IV Push every 6 hours PRN Nausea and/or Vomiting  oxyCODONE    IR 5 milliGRAM(s) Oral every 4 hours PRN Severe Pain (7 - 10)  traMADol 50 milliGRAM(s) Oral every 4 hours PRN Mild Pain (1 - 3)  traMADol 100 milliGRAM(s) Oral every 4 hours PRN Moderate Pain (4 - 6)      Care Discussed with Consultants/Other Providers [x] YES  [ ] NO    Vital Signs Last 24 Hrs  T(C): 36.5 (24 Nov 2021 09:00), Max: 36.9 (24 Nov 2021 05:02)  T(F): 97.7 (24 Nov 2021 09:00), Max: 98.5 (24 Nov 2021 05:02)  HR: 71 (24 Nov 2021 09:00) (71 - 86)  BP: 128/79 (24 Nov 2021 09:00) (109/63 - 128/79)  BP(mean): --  RR: 18 (24 Nov 2021 09:00) (18 - 18)  SpO2: 98% (24 Nov 2021 09:00) (95% - 98%)  I&O's Summary    23 Nov 2021 07:01  -  24 Nov 2021 07:00  --------------------------------------------------------  IN: 2700 mL / OUT: 1450 mL / NET: 1250 mL    PHYSICAL EXAM:  GENERAL: NAD, Comfortable  HEAD:  Atraumatic, Normocephalic  EYES: EOMI, PERRLA, conjunctiva and sclera clear  NECK: Supple, No JVD  CHEST/LUNG: Clear to auscultation bilaterally; No wheeze  HEART: Regular rate and rhythm; No murmurs, rubs, or gallops  ABDOMEN: Soft, Nontender, Nondistended; Bowel sounds present  NEURO: AAOx3, no focal deficit, 5/5 b/l extremities  EXTREMITIES:  2+ Peripheral Pulses, No clubbing, cyanosis, or edema, left hip Aquacel dsg c/d/i  SKIN: No rashes or lesions         SUBJECTIVE / OVERNIGHT EVENTS:    no events overnight  patient seen and examined  denies cp/sob  no n/v/d  ambulating    --------------------------------------------------------------------------------------------  LABS:                        12.7   11.09 )-----------( 196      ( 23 Nov 2021 06:41 )             39.4     11-23    141  |  105  |  17  ----------------------------<  94  3.9   |  24  |  0.94    Ca    9.0      23 Nov 2021 06:39        CAPILLARY BLOOD GLUCOSE                RADIOLOGY & ADDITIONAL TESTS:    Imaging Personally Reviewed:  [x] YES  [ ] NO    Consultant(s) Notes Reviewed:  [x] YES  [ ] NO    MEDICATIONS  (STANDING):  acetaminophen     Tablet .. 975 milliGRAM(s) Oral every 8 hours  aspirin 81 milliGRAM(s) Oral two times a day  celecoxib 100 milliGRAM(s) Oral every 12 hours  doxazosin 4 milliGRAM(s) Oral at bedtime  influenza  Vaccine (HIGH DOSE) 0.7 milliLiter(s) IntraMuscular once  lactated ringers. 100 milliLiter(s) (150 mL/Hr) IV Continuous <Continuous>  levothyroxine 75 MICROGram(s) Oral daily  lisinopril 20 milliGRAM(s) Oral daily  pantoprazole    Tablet 40 milliGRAM(s) Oral before breakfast  polyethylene glycol 3350 17 Gram(s) Oral at bedtime  senna 2 Tablet(s) Oral at bedtime    MEDICATIONS  (PRN):  ondansetron Injectable 4 milliGRAM(s) IV Push every 6 hours PRN Nausea and/or Vomiting  oxyCODONE    IR 5 milliGRAM(s) Oral every 4 hours PRN Severe Pain (7 - 10)  traMADol 50 milliGRAM(s) Oral every 4 hours PRN Mild Pain (1 - 3)  traMADol 100 milliGRAM(s) Oral every 4 hours PRN Moderate Pain (4 - 6)      Care Discussed with Consultants/Other Providers [x] YES  [ ] NO    Vital Signs Last 24 Hrs  T(C): 36.5 (24 Nov 2021 09:00), Max: 36.9 (24 Nov 2021 05:02)  T(F): 97.7 (24 Nov 2021 09:00), Max: 98.5 (24 Nov 2021 05:02)  HR: 71 (24 Nov 2021 09:00) (71 - 86)  BP: 128/79 (24 Nov 2021 09:00) (109/63 - 128/79)  BP(mean): --  RR: 18 (24 Nov 2021 09:00) (18 - 18)  SpO2: 98% (24 Nov 2021 09:00) (95% - 98%)  I&O's Summary    23 Nov 2021 07:01  -  24 Nov 2021 07:00  --------------------------------------------------------  IN: 2700 mL / OUT: 1450 mL / NET: 1250 mL    PHYSICAL EXAM:  GENERAL: NAD, Comfortable  HEAD:  Atraumatic, Normocephalic  CHEST/LUNG: Clear to auscultation bilaterally; No wheeze  HEART: Regular rate and rhythm; No murmurs, rubs, or gallops  ABDOMEN: Soft, Nontender, Nondistended; Bowel sounds present  NEURO: AAOx3, no focal deficit  EXTREMITIES:  2+ Peripheral Pulses, No clubbing, cyanosis, or edema, left hip Aquacel dsg c/d/i  SKIN: No rashes or lesions

## 2021-11-24 NOTE — PROGRESS NOTE ADULT - ATTENDING COMMENTS
80yo M pmhx HTN, HLD, hypothyroidism, SOHAIL (uses CPAP @ HS), and BPH here for L THR. Vitals stable. labs reviewed. doing well post op. pain controlled. PT OOB. dc planning per primary team.
I agree with the above note and have personally seen and examined this patient. All pertinent films have been reviewed. Please refer to clinical documentation of the history, physical examinations, data summary, and both assessment and plan as documented above and with which I agree.    I spoke with patient preop, postop day 0, 1, 2 and he is refusing to leave. he is walking around the unit unsupported with a cane only, able to clear pt and go up and down stairs. he wants to stay for the thanksgiving holiday until friday. I explained to him that he is cleared to discharge home and he is refusing to discharge home today. he was notified of this yesterday and today.    Eliseo Larose MD  Attending Orthopedic Surgeon

## 2021-11-24 NOTE — PROGRESS NOTE ADULT - SUBJECTIVE AND OBJECTIVE BOX
Post op Day [ 2]    Patient resting without complaints.  No chest pain, SOB, N/V.    T(C): 36.9 (11-24-21 @ 05:02), Max: 36.9 (11-24-21 @ 05:02)  HR: 73 (11-24-21 @ 05:02) (67 - 86)  BP: 119/71 (11-24-21 @ 05:02) (109/63 - 123/75)  RR: 18 (11-24-21 @ 05:02) (18 - 18)  SpO2: 95% (11-24-21 @ 05:02) (95% - 98%)  Wt(kg): --    Exam:  Alert and Oriented, No Acute Distress  L Hip aquacel c/d/i with soft/compressible compartments  Calves Soft, Non-tender bilaterally  +PF/DF/EHL/FHL  SILT  +DP Pulse                        12.7   11.09 )-----------( 196      ( 23 Nov 2021 06:41 )             39.4    11-23    141  |  105  |  17  ----------------------------<  94  3.9   |  24  |  0.94    Ca    9.0      23 Nov 2021 06:39

## 2021-11-24 NOTE — PROGRESS NOTE ADULT - PROBLEM SELECTOR PLAN 1
PT/OT-WBAT  IS  DVT PPx  Pain Control  Continue Current Tx.  Discharge planning    Carson Pradhan PA-C  Team Pager: #[phone]

## 2021-11-29 ENCOUNTER — NON-APPOINTMENT (OUTPATIENT)
Age: 81
End: 2021-11-29

## 2021-12-06 ENCOUNTER — APPOINTMENT (OUTPATIENT)
Dept: ORTHOPEDIC SURGERY | Facility: CLINIC | Age: 81
End: 2021-12-06
Payer: MEDICARE

## 2021-12-06 VITALS — BODY MASS INDEX: 22.88 KG/M2 | WEIGHT: 151 LBS | HEIGHT: 68 IN

## 2021-12-06 PROCEDURE — 99024 POSTOP FOLLOW-UP VISIT: CPT

## 2021-12-06 PROCEDURE — 73502 X-RAY EXAM HIP UNI 2-3 VIEWS: CPT

## 2021-12-06 NOTE — HISTORY OF PRESENT ILLNESS
[de-identified] : Status-post left total hip  arthroplasty here for initial postoperative evaluation. Excellent progress is noted in terms of pain and restoration of function. Pain is well controlled with oral medications. There has been no change in medical health since discharge. The patient does require assistive devices.

## 2021-12-06 NOTE — PHYSICAL EXAM
[de-identified] : Well developed, well nourished in no apparent distress, awake, alert and orientated to person, place and time with appropriate mood and affect\par Respirations are even and unlabored. Gait evaluation does not reveal a limp. There is no inguinal adenopathy. The affected limb is well-perfused with palpable pedal pulse, without skin lesions, shows a grossly normal motor and sensory examination. Incision is healed Hip motion is full and painless throughout ROM. Leg lengths are approximately equal  [de-identified] : AP pelvis, AP hip, and lateral x-rays of the left hip were ordered and obtained in the office and demonstrate satisfactory position and alignment of the components are present. No signs of loosening are seen.

## 2021-12-06 NOTE — DISCUSSION/SUMMARY
[de-identified] : The patient is doing well after joint replacement surgery. Written infectious precautions were reviewed. The patient will progress with physical therapy at this time and they will work on transitioning from requiring assistive devices for ambulation. Aspirin therapy will be discontinued at 1 month post surgery for the purpose of orthopedic thromboembolism prophylaxis. Return around the 6 week anniversary from surgery for follow-up evaluation.

## 2022-01-05 ENCOUNTER — APPOINTMENT (OUTPATIENT)
Dept: ORTHOPEDIC SURGERY | Facility: CLINIC | Age: 82
End: 2022-01-05
Payer: MEDICARE

## 2022-01-05 PROCEDURE — 99024 POSTOP FOLLOW-UP VISIT: CPT

## 2022-01-05 NOTE — HISTORY OF PRESENT ILLNESS
[de-identified] : This is very nice 81 year male  here for interim evaluation of left total hip arthroplasty. The patient reports good pain relief since surgery in the replaced joint and satisfactory restoration of function in terms of activities of daily living. The patient no longer requires an assistive device for ambulation, does not require pain medication, and completed postoperative physical therapy. They report unlimited activities of daily living and unlimited walking tolerance. The patient is thrilled with their progress from surgery and ultimate outcome.

## 2022-01-05 NOTE — PHYSICAL EXAM
[de-identified] : Well developed, well nourished in no apparent distress, awake, alert and orientated to person, place and time with appropriate mood and affect\par Respirations are even and unlabored. Gait evaluation does not reveal a limp. There is no inguinal adenopathy. The affected limb is well-perfused with palpable pedal pulse, without skin lesions, shows a grossly normal motor and sensory examination. Incision is healed Hip motion is full and painless throughout ROM. Leg lengths are approximately equal

## 2022-01-05 NOTE — DISCUSSION/SUMMARY
[de-identified] : The patient is doing well after joint replacement surgery. Continue to be weight bearing as tolerated without restriction. Follow up is recommended at the 6 month anniversary from surgery.

## 2022-01-12 ENCOUNTER — APPOINTMENT (OUTPATIENT)
Dept: DERMATOLOGY | Facility: CLINIC | Age: 82
End: 2022-01-12
Payer: MEDICARE

## 2022-01-12 ENCOUNTER — APPOINTMENT (OUTPATIENT)
Dept: PLASTIC SURGERY | Facility: CLINIC | Age: 82
End: 2022-01-12
Payer: MEDICARE

## 2022-01-12 VITALS — HEIGHT: 68 IN | BODY MASS INDEX: 23.64 KG/M2 | WEIGHT: 156 LBS

## 2022-01-12 VITALS — WEIGHT: 156 LBS | HEIGHT: 68 IN | BODY MASS INDEX: 23.64 KG/M2

## 2022-01-12 DIAGNOSIS — R23.8 OTHER SKIN CHANGES: ICD-10-CM

## 2022-01-12 PROCEDURE — 99203 OFFICE O/P NEW LOW 30 MIN: CPT

## 2022-01-13 NOTE — HISTORY OF PRESENT ILLNESS
[FreeTextEntry1] : 81 yeas old patient present in the office for \par Problem:  mass\par Location:  neck\par Duration:  about 5 years\par Seen by Dermatology:  yes\par No previous treatments\par Any itching: no         bleeding: no                  Drainage: no \par No imaging/Biopsy\par size has been stable\par Denies infection or inflammation\par Denies pain or discomfort\par Had BCC removed on right leg\par \par \par

## 2022-01-13 NOTE — REVIEW OF SYSTEMS
[As noted in HPI] : as noted in HPI [As Noted in HPI] : as noted in HPI [Negative] : Heme/Lymph [FreeTextEntry5] : MVR, MVP [FreeTextEntry6] : asthma [FreeTextEntry8] : GERD [FreeTextEntry9] : OA

## 2022-01-20 ENCOUNTER — LABORATORY RESULT (OUTPATIENT)
Age: 82
End: 2022-01-20

## 2022-01-20 ENCOUNTER — APPOINTMENT (OUTPATIENT)
Dept: PLASTIC SURGERY | Facility: CLINIC | Age: 82
End: 2022-01-20
Payer: MEDICARE

## 2022-01-20 DIAGNOSIS — R22.9 LOCALIZED SWELLING, MASS AND LUMP, UNSPECIFIED: ICD-10-CM

## 2022-01-20 PROCEDURE — 21555 EXC NECK LES SC < 3 CM: CPT

## 2022-01-20 PROCEDURE — 13131 CMPLX RPR F/C/C/M/N/AX/G/H/F: CPT | Mod: 58

## 2022-01-20 NOTE — PROCEDURE
[FreeTextEntry6] : Preopdx:  neck  cyst \par Procedure: excisional biopsy  1.1 cm, and complex closure 1.1 cm neck\par Anesthesia: local 1% w/epi\par Specimens: to path on formalin\par No complications\par \par Summary:\par IC obtained.  Lesion demarcated with marking pen.  1%lido with epinephrine injected.  15 blade used to incise full thickness.    1.1Cm  lesion excised in subcutaneous plane.   Hemostasis obtained with cautery.  Skin edges widely undermined and closed for a complex closure of  1.1 cm.  bacitracin and steristrips placed.  \par \par

## 2022-01-26 ENCOUNTER — APPOINTMENT (OUTPATIENT)
Dept: PLASTIC SURGERY | Facility: CLINIC | Age: 82
End: 2022-01-26
Payer: MEDICARE

## 2022-01-26 DIAGNOSIS — L72.0 EPIDERMAL CYST: ICD-10-CM

## 2022-01-26 PROCEDURE — 99024 POSTOP FOLLOW-UP VISIT: CPT

## 2022-01-26 NOTE — HISTORY OF PRESENT ILLNESS
[FreeTextEntry1] : Dop - 01/20/22\par S/P - Excisional Biopsy of neck mass. \par Path - epidermal inclusion cyst. \par  No excessive bleeding. No fevers. No odor. No purulent discharge. No excessive pain.\par

## 2022-02-13 LAB — SARS-COV-2 N GENE NPH QL NAA+PROBE: NOT DETECTED

## 2022-02-16 ENCOUNTER — APPOINTMENT (OUTPATIENT)
Dept: PULMONOLOGY | Facility: CLINIC | Age: 82
End: 2022-02-16
Payer: MEDICARE

## 2022-02-16 VITALS
HEIGHT: 68 IN | WEIGHT: 156 LBS | SYSTOLIC BLOOD PRESSURE: 110 MMHG | HEART RATE: 76 BPM | DIASTOLIC BLOOD PRESSURE: 80 MMHG | RESPIRATION RATE: 17 BRPM | TEMPERATURE: 97.2 F | OXYGEN SATURATION: 98 % | BODY MASS INDEX: 23.64 KG/M2

## 2022-02-16 PROCEDURE — 94010 BREATHING CAPACITY TEST: CPT

## 2022-02-16 PROCEDURE — 94727 GAS DIL/WSHOT DETER LNG VOL: CPT

## 2022-02-16 PROCEDURE — 95012 NITRIC OXIDE EXP GAS DETER: CPT

## 2022-02-16 PROCEDURE — ZZZZZ: CPT

## 2022-02-16 PROCEDURE — 94729 DIFFUSING CAPACITY: CPT

## 2022-02-16 PROCEDURE — 99214 OFFICE O/P EST MOD 30 MIN: CPT | Mod: 25

## 2022-02-16 NOTE — HISTORY OF PRESENT ILLNESS
[FreeTextEntry1] : Mr. Davenport is a 81 year old male presenting to the office for a follow up visit for LPRD, hoarseness, SOHAIL, postnasal drip and RADs. His chief complaint is \par - Nov 22nd he had the left hip replacement with  \par - he has been feeling good \par - no heart burn / reflux at the moment\par - occasionally has sour taste in the mouth \par - no swallowing issues\par - bowels are regular \par - sense of smell / taste is alright \par - he notes he has some ankle swelling right now \par - he notes his left leg is still not back to itself \par - he notes his sense is off a bit \par - no snoring \par - energy level is okay \par - he notes he has been working and he notes it keeps him healthy. \par - he occasionally wakes up at night to urinate\par - he notes he has BPH\par - no new meds \par - he notes he had not gotten the COVID booster, only J&J once. \par - he notes his tinnitus is still there, he ignores it. The only time hes affected by it is in the mornings. \par patient denies any headaches, nausea, vomiting, fever, chills, sweats, chest pain, chest pressure, palpitations, coughing, wheezing, fatigue, diarrhea, constipation, dysphagia, myalgias, dizziness, leg swelling, leg pain, itchy eyes, itchy ears, heartburn, reflux or sour taste in the mouth

## 2022-02-16 NOTE — ADDENDUM
[FreeTextEntry1] : Documented by Arminda Chang acting as a scribe for Dr. Turner Sanchez on (02/16/2022).\par \par All medical record entries made by the Scribe were at my, Dr. Turner Sanchez's, direction and personally dictated by me on (02/16/2022). I have reviewed the chart and agree that the record accurately reflects my personal performance of the history, physical exam, assessment and plan. I have also personally directed, reviewed, and agree with the discharge instructions.\par

## 2022-02-16 NOTE — ASSESSMENT
[FreeTextEntry1] : Mr. Davenport is 81 y.o M who has a history of RADs, allergies, GERD and OSAS, BPH, (+) IgE, Asthma, mild PAH on echo 2021.  He presents to the office today for a follow up.  He is vaccinated once (J&J) only. \par \par problem 1: RADs/mild intermittent asthma (quiet)\par -quiet at this point in time\par -no therapy at this point time \par -Asthma is  believed to be caused by inherited (genetic) and environmental factor, but its exact cause is unknown. Asthma may be triggered by allergens, lung infections, or irritants in the air. Asthma triggers are different for each person \par -Inhaler technique reviewed as well as oral hygiene techniques reviewed with patient. Avoidance of cold air, extremes of temperature, rescue inhaler should be used before exercise. Order of medication reviewed with patient. Recommended use of a cool mist humidifier in the bedroom. \par \par Problem 1A: Elevated IgE \par - candidate for Xolair \par -Xolair is a recombinant DNA- derived humanized IgG1K monoclonal antibody that selectively binds ot human immunoglobulin E (IgE). Xolair is produced by a Chinese hamster ovary cell suspension culture in nutrient medium containing the antibiotic gentamicin. Gentamicin is not detectable in the final product. Xolair is a sterile, white, preservative free, lyophilized powder contained in a single use vial that is reconstituted with sterile water for suspension. Side effects include: wheezing, tightness of the chest, trouble breathing, hives, skin rash, feeling anxious or light-headed, fainting, warmth or tingling under skin, or swelling of face, lips, or tongue \par \par Problem 1B Cardio (PAH) mild\par -complete yearly ECHO next 9/2022\par \par problem 2: allergic rhinitis \par -continue to use Qnasl or Flonase 1 sniff each nostril BID\par -continue to use Claritin OTC\par -s/p blood work to include: IgE level (+), eosinophil level (-), vitamin D level (-), food IgE level (+), and asthma profile (+)\par -Environmental measures for allergies were encouraged including mattress and pillow cover, air purifier, and environmental controls.\par \par problem 3: GERD\par -continue to use Prilosec 20 mg QOD, transition to 10 mg QD if able \par - continue Pepcid AC 60 mg QHS\par  -Add DGL pre-meal qHS \par -Recommend Alkalol Gargle PRN\par -continue to follow up with Dr. Huffman\par -Things to avoid including overeating, spicy foods, tight clothing, eating within three hours of bed, this list is not all inclusive. \par -For treatment of reflux, possible options discussed including diet control, H2 blockers, PPIs, as well as coating motility agents discussed as treatment options. Timing of meals and proximity of last meal to sleep were discussed. If symptoms persist, a formal gastrointestinal evaluation is needed.\par \par \par problem 4: OSAS\par - Patient is using and benefiting from his CPAP\par -he is being recommended to continue use of the CPAP machine  and has been compliant and tolerating it well. \par -he is recommended to use Oxy-Aid by [employer] in the interim\par -Discussed the risks/associations with coronary artery disease, atrial fibrillation, arrhythmia, memory loss, issues with concentration, stroke risk, hypertension, nocturia, chronic reflux/Terrazas’s esophagus some but not all inclusive. Treatment options discussed including CPAP/BiPAP machine, oral appliance, ProVent therapy, Oxy-Aid by [employer], new technologies, or positional sleep.\par \par problem 5: fatigue and use of supplements\par -continue baby aspirin QD\par -continue Co-Q 10 200 mg QD\par -continue L- Carnitine 500 mg BID\par \par problem 6: dysphonia\par -recommended to take Dolce Voce\par \par problem 7: tinnitus\par -recommended to try Quietus\par -recommended Breath-right strips QHS\par \par Problem 8: Health Maintenance/COVID19 Precautions:\par -Recommended Supplements:\par -SPM\par -Tumeric\par -Topricin\par \par - s/p covid 19 vaccine (Adalid & Adalid) (Refused Booster)\par -Covid 19 precaution, vaccine and booster discussed at length and recommended \par \par - Clean your hands often. Wash your hands often with soap and water for at least 20 seconds, especially after blowing your nose, coughing, or sneezing, or having been in a public place.\par - If soap and water are not available, use a hand  that contains at least 60% alcohol.\par - To the extent possible, avoid touching high-touch surfaces in public places - elevator buttons, door handles, handrails, handshaking with people, etc. Use a tissue or your sleeve to cover your hand or finger if you must touch something.\par - Wash your hands after touching surfaces in public places.\par Immune Support Recommendations:\par -OTC Vitamin C 500mg BID \par -OTC Quercetin 250-500mg BID \par -OTC Zinc 75-100mg per day \par -OTC Melatonin 1or 2mg a night \par -OTC Vitamin D 1-4000mg per day \par -OTC Tonic Water 8oz per day\par \par \par problem 9: health maintenance \par - rash / itch - Allergic profile - s/p blood work to include: IgE level, eosinophil level, vitamin D level, food IgE level, and asthma profile (NC)\par - Recommended to see Dermatologist - Dr. Mitch Boxer (past)\par -recommended Topricin cream for Derm \par -refused 2018 flu shot - recommended to get flu shot this year \par -recommended strep pneumonia vaccines: Prevnar-13 vaccine (up to date), followed by Pneumo vaccine 23 one year following (completed)\par -recommended early intervention for URIs\par -recommended regular osteoporosis evaluations\par -recommended early dermatological evaluations\par -recommended after the age of 50 to the age of 70, colonoscopy every 5 years \par \par \par F/U in 4 months \par He is encouraged to call with any changes, concerns, or questions

## 2022-02-16 NOTE — PROCEDURE
[FreeTextEntry1] : Feno was 28 ; a normal value being less than 25. Fractional exhaled nitric oxide (FENO) is regarded as a simple, noninvasive method for assessing eosinophilic airway inflammation. Produced by a variety of cells within the lung, nitric oxide (NO) concentrations are generally low in healthy individuals. However, high concentrations of NO appear to be involved in nonspecific host defense mechanisms and chronic inflammatory  diseases such as asthma. The American Thoracic Society (ATS) therefore recommended using FENO to aid in the diagnosis and monitoring of eosinophilic airway inflammation and asthma, and for identifying steroid responsive individuals whose chronic respiratory symptoms may be caused by airway inflammation \par \par Full PFT revealed normal flows, with a FEV1 of 2.61L,which is 99% of predicted,  normal lung volumes, and a diffusion of  21.1, which is 129% of predicted with a normal flow volume loop\par \par

## 2022-03-09 ENCOUNTER — APPOINTMENT (OUTPATIENT)
Dept: CARDIOLOGY | Facility: CLINIC | Age: 82
End: 2022-03-09
Payer: MEDICARE

## 2022-03-09 ENCOUNTER — NON-APPOINTMENT (OUTPATIENT)
Age: 82
End: 2022-03-09

## 2022-03-09 VITALS
TEMPERATURE: 98 F | SYSTOLIC BLOOD PRESSURE: 125 MMHG | DIASTOLIC BLOOD PRESSURE: 78 MMHG | BODY MASS INDEX: 23.34 KG/M2 | WEIGHT: 154 LBS | HEIGHT: 68 IN | OXYGEN SATURATION: 98 % | RESPIRATION RATE: 15 BRPM | HEART RATE: 66 BPM

## 2022-03-09 PROCEDURE — 93000 ELECTROCARDIOGRAM COMPLETE: CPT

## 2022-03-09 PROCEDURE — 99214 OFFICE O/P EST MOD 30 MIN: CPT

## 2022-03-09 NOTE — REASON FOR VISIT
[CV Risk Factors and Non-Cardiac Disease] : CV risk factors and non-cardiac disease [Hyperlipidemia] : hyperlipidemia [Structural Heart and Valve Disease] : structural heart and valve disease [Follow-Up - Clinic] : a clinic follow-up of [Hypertension] : hypertension [Mitral Regurgitation] : mitral regurgitation [FreeTextEntry1] : murmur, dyspepsia

## 2022-03-09 NOTE — PHYSICAL EXAM
[Well Developed] : well developed [Well Nourished] : well nourished [No Acute Distress] : no acute distress [Normal Venous Pressure] : normal venous pressure [No Carotid Bruit] : no carotid bruit [Normal S1, S2] : normal S1, S2 [No Murmur] : no murmur [No Rub] : no rub [No Gallop] : no gallop heard [II] : a grade 2 [No Abnormalities] : the abdominal aorta was not enlarged and no bruit was heard [Clear Lung Fields] : clear lung fields [Good Air Entry] : good air entry [No Respiratory Distress] : no respiratory distress  [Soft] : abdomen soft [Non Tender] : non-tender [No Masses/organomegaly] : no masses/organomegaly [Normal Bowel Sounds] : normal bowel sounds [Normal Gait] : normal gait [No Edema] : no edema [No Cyanosis] : no cyanosis [No Clubbing] : no clubbing [No Varicosities] : no varicosities [No Rash] : no rash [No Skin Lesions] : no skin lesions [Moves all extremities] : moves all extremities [No Focal Deficits] : no focal deficits [Normal Speech] : normal speech [Alert and Oriented] : alert and oriented [Normal memory] : normal memory [General Appearance - Well Developed] : well developed [Well Groomed] : well groomed [Normal Appearance] : normal appearance [General Appearance - Well Nourished] : well nourished [No Deformities] : no deformities [General Appearance - In No Acute Distress] : no acute distress [Normal Conjunctiva] : the conjunctiva exhibited no abnormalities [Normal Oral Mucosa] : normal oral mucosa [Normal Oropharynx] : normal oropharynx [Normal Jugular Venous A Waves Present] : normal jugular venous A waves present [Normal Jugular Venous V Waves Present] : normal jugular venous V waves present [No Jugular Venous Merino A Waves] : no jugular venous merino A waves [Respiration, Rhythm And Depth] : normal respiratory rhythm and effort [Auscultation Breath Sounds / Voice Sounds] : lungs were clear to auscultation bilaterally [Exaggerated Use Of Accessory Muscles For Inspiration] : no accessory muscle use [Bowel Sounds] : normal bowel sounds [Abdomen Soft] : soft [Nail Clubbing] : no clubbing of the fingernails [Abnormal Walk] : normal gait [Cyanosis, Localized] : no localized cyanosis [Skin Color & Pigmentation] : normal skin color and pigmentation [Skin Turgor] : normal skin turgor [] : no rash [Impaired Insight] : insight and judgment were intact [Oriented To Time, Place, And Person] : oriented to person, place, and time [Affect] : the affect was normal [Mood] : the mood was normal [5th Left ICS - MCL] : palpated at the 5th LICS in the midclavicular line [Normal] : normal [No Precordial Heave] : no precordial heave was noted [Normal Rate] : normal [Rhythm Regular] : regular [Normal S1] : normal S1 [Normal S2] : normal S2 [I] : a grade 1 [2+] : left 2+ [No Pitting Edema] : no pitting edema present

## 2022-03-09 NOTE — DISCUSSION/SUMMARY
[FreeTextEntry1] : This is a 81-year-old male with past medical history significant for mitral valve prolapse with moderate mitral valve regurgitation, hypertension, murmur, status post left hip replacement surgery, dyspepsia, reactive airway disease, who comes in for preoperative cardiac clearance.  He denies chest pain, shortness of breath, dizziness or syncope.\par Electrocardiogram done March 9, 2022 demonstrate normal sinus rhythm rate 66 bpm and is otherwise unremarkable.\par The patient had blood work done with his primary care physician February 12/2022 which demonstrated cholesterol 155, HDL 55, triglycerides of 46, and LDL calculated 91 mg/dL.\par The patient will continue on his current dose of ramipril 5 mg daily his blood pressure seems to be under good control.  He will follow-up with his primary care physician.  He is currently hemodynamically stable and asymptomatic from a cardiac standpoint.\par Echo Doppler examination done September 1, 2021 demonstrated bileaflet mitral valve prolapse and mild to moderate mitral valve regurgitation, mild to moderate tricuspid valve regurgitation, mild pulmonic valve regurgitation, satisfactory left ventricular function with ejection fraction of 65%.\par \par Lipid panel done October 19, 2021 demonstrated cholesterol 178, HDL of 52, triglycerides of 54, non-HDL cholesterol 126, and LDL calculated 115 mg/dL with hemoglobin A1c of 5.8.\par He had a normal exercise stress test March 18, 2020.\par Echo Doppler examination done September 1, 2021 demonstrated mitral valve prolapse with mild to moderate mitral valve regurgitation, mild to moderate tricuspid valve regurgitation, mild pulmonic valve regurgitation with normal left ventricular ejection fraction 65%.\par Electrocardiogram done November 15, 2021 demonstrate normal sinus rhythm at a rate of 86 bpm is otherwise unremarkable.\par \par The patient understands that aerobic exercises must be increased to 40 minutes 4 times per week. A detailed discussion of lifestyle modification was done today. The patient has a good understanding of the diagnosis, and treatment plan. Lifestyle modification was also outlined.

## 2022-03-15 ENCOUNTER — APPOINTMENT (OUTPATIENT)
Dept: ALLERGY | Facility: CLINIC | Age: 82
End: 2022-03-15
Payer: MEDICARE

## 2022-03-15 VITALS
HEART RATE: 63 BPM | SYSTOLIC BLOOD PRESSURE: 125 MMHG | RESPIRATION RATE: 15 BRPM | TEMPERATURE: 98 F | DIASTOLIC BLOOD PRESSURE: 78 MMHG | OXYGEN SATURATION: 95 %

## 2022-03-15 PROCEDURE — 95018 ALL TSTG PERQ&IQ DRUGS/BIOL: CPT

## 2022-03-15 PROCEDURE — 95004 PERQ TESTS W/ALRGNC XTRCS: CPT

## 2022-03-15 PROCEDURE — 99214 OFFICE O/P EST MOD 30 MIN: CPT | Mod: 25

## 2022-03-15 NOTE — PHYSICAL EXAM
[Alert] : alert [Well Nourished] : well nourished [Healthy Appearance] : healthy appearance [No Acute Distress] : no acute distress [Well Developed] : well developed [Normal Voice/Communication] : normal voice communication [Normal Nasal Mucosa] : the nasal mucosa was normal [Normal Lips/Tongue] : the lips and tongue were normal [Normal Tonsils] : normal tonsils [No Neck Mass] : no neck mass was observed [No LAD] : no lymphadenopathy [Normal Rate and Effort] : normal respiratory rhythm and effort [No Crackles] : no crackles [No Retractions] : no retractions [Bilateral Audible Breath Sounds] : bilateral audible breath sounds [Normal Rate] : heart rate was normal  [Normal S1, S2] : normal S1 and S2 [No murmur] : no murmur [Regular Rhythm] : with a regular rhythm [Normal Cervical Lymph Nodes] : cervical [Normal Mood] : mood was normal [Normal Affect] : affect was normal [Judgment and Insight Age Appropriate] : judgement and insight is age appropriate [Alert, Awake, Oriented as Age-Appropriate] : alert, awake, oriented as age appropriate [Wheezing] : no wheezing was heard [de-identified] : xerosis

## 2022-03-15 NOTE — REVIEW OF SYSTEMS
[Heartburn] : heartburn [Pruritus] : pruritus [Nl] : Genitourinary [FreeTextEntry9] : hip replacement

## 2022-03-15 NOTE — HISTORY OF PRESENT ILLNESS
[Asthma] : asthma [Allergic Rhinitis] : allergic rhinitis [Eczematous rashes] : eczematous rashes [Venom Reactions] : venom reactions [Food Allergies] : food allergies [de-identified] : Patient had surgery 11/22/21 - left hip replacement - the itching started around that time - lower back - extending up to his shoulder blades - with periodic tingling thru his body - he has been using Cerave cream - Eucerin on top of this and he has improved about 75% - for the past 7 years - chronic PND - constant throat clearing - ENT - GERD - GI - GERD - taking Pepcid 40 mg QHS - omeprazole QOD 20 mg

## 2022-03-15 NOTE — SOCIAL HISTORY
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

## 2022-03-15 NOTE — ASSESSMENT
[FreeTextEntry1] : Xerosis with pruritus - continue Cerave and Eucerin topical moisturizers\par \par Throat clearing secondary to GERD:\par \par Increase Pepcid to 40 mg BID as trial \par Follow up with GI

## 2022-03-22 ENCOUNTER — APPOINTMENT (OUTPATIENT)
Dept: DERMATOLOGY | Facility: CLINIC | Age: 82
End: 2022-03-22

## 2022-04-27 ENCOUNTER — APPOINTMENT (OUTPATIENT)
Dept: GASTROENTEROLOGY | Facility: CLINIC | Age: 82
End: 2022-04-27
Payer: MEDICARE

## 2022-04-27 VITALS
WEIGHT: 154 LBS | DIASTOLIC BLOOD PRESSURE: 78 MMHG | TEMPERATURE: 97.7 F | HEIGHT: 68 IN | SYSTOLIC BLOOD PRESSURE: 122 MMHG | HEART RATE: 89 BPM | BODY MASS INDEX: 23.34 KG/M2

## 2022-04-27 DIAGNOSIS — R10.13 EPIGASTRIC PAIN: ICD-10-CM

## 2022-04-27 PROCEDURE — 99204 OFFICE O/P NEW MOD 45 MIN: CPT

## 2022-04-27 NOTE — REVIEW OF SYSTEMS
[As Noted in HPI] : as noted in HPI [Fever] : no fever [Eyesight Problems] : no eyesight problems [Discharge From Eyes] : no purulent discharge from the eyes [Nosebleeds] : no nosebleeds [Nasal Discharge] : no nasal discharge [Chest Pain] : no chest pain [Palpitations] : no palpitations [Hesitancy] : no urinary hesitancy [Nocturia] : no nocturia [Limb Pain] : no limb pain [Skin Lesions] : no skin lesions [Dizziness] : no dizziness [Fainting] : no fainting [Anxiety] : no anxiety [Depression] : no depression [Muscle Weakness] : no muscle weakness [Easy Bleeding] : no tendency for easy bleeding [Easy Bruising] : no tendency for easy bruising [Swollen Glands] : no swollen glands [Swollen Glands In The Neck] : no swollen glands in the neck

## 2022-04-27 NOTE — ASSESSMENT
[FreeTextEntry1] : . gerd , with c/o postnasal drip and hoarseness on ppi and h2 blocker chronic, s/p egd in 2018\par \par plan BRAVO capsule\par        esophageal manometry\par           continue prilosec 20mg qam\par           continue famotidine 40mg bid in afternoon and qhs\par             antireflux diet

## 2022-04-27 NOTE — PHYSICAL EXAM
[General Appearance - Alert] : alert [General Appearance - In No Acute Distress] : in no acute distress [General Appearance - Well Nourished] : well nourished [General Appearance - Well Developed] : well developed [Sclera] : the sclera and conjunctiva were normal [Hearing Threshold Finger Rub Not [place]] : hearing was normal [Respiration, Rhythm And Depth] : normal respiratory rhythm and effort [Auscultation Breath Sounds / Voice Sounds] : lungs were clear to auscultation bilaterally [Heart Sounds] : normal S1 and S2 [Abnormal Walk] : normal gait [] : no rash [Skin Lesions] : no skin lesions [No Focal Deficits] : no focal deficits [Oriented To Time, Place, And Person] : oriented to person, place, and time

## 2022-04-27 NOTE — HISTORY OF PRESENT ILLNESS
[FreeTextEntry1] : 80 yo  male with h/o gerd, saw EnT  had LERD, globus sensattion. Patient saw allergist, told he has no allergist.\par Patient currently on famotidine  40mg qhs and patient on prilosec 20mg qam and famotidine 4j0mg  daily. patient reports symptoms improved on current regimen. no dysphagia, no weight loss.\par \par \par s/p egd 09/2018 unremrkable\par s/p colonoscopy in 2019 \par colonoscopy 02/2016 no polyps

## 2022-04-28 ENCOUNTER — APPOINTMENT (OUTPATIENT)
Dept: GASTROENTEROLOGY | Facility: CLINIC | Age: 82
End: 2022-04-28
Payer: MEDICARE

## 2022-04-28 ENCOUNTER — NON-APPOINTMENT (OUTPATIENT)
Age: 82
End: 2022-04-28

## 2022-04-28 DIAGNOSIS — R49.0 DYSPHONIA: ICD-10-CM

## 2022-04-28 DIAGNOSIS — R09.89 OTHER SPECIFIED SYMPTOMS AND SIGNS INVOLVING THE CIRCULATORY AND RESPIRATORY SYSTEMS: ICD-10-CM

## 2022-04-28 PROCEDURE — 99443: CPT | Mod: 95

## 2022-04-28 NOTE — ASSESSMENT
[FreeTextEntry1] : 81 year old male referred by Dr. Brandon Frankel to Dr. Loni Bullard for esophageal manometry and pH testing due to hoarsness and throat clearing.\par \par Discussed the two options for pH testing - EGD/BRAVO and 24 hour pH testing. Patient underwent EGD in 2018. To avoid anesthesia risk in this elderly patient and given patient's symptoms are more typical for LPR, we agreed to proceed with 24 hour pH testing. Patient is aware he will leave the endoscopy suite with a transnasal catheter in placed x 24 hours. Patient currently takes omeprazole 20mg daily and famotidine 40mg BID. He was instructed to discontinue omeprazole 7 days prior to procedure and famotidine 3 days prior to procedure. \par \par The risks, benefits and alternatives of the Esophageal Manometry and 24 hour pH procedures were discussed in detail with the patient. Risks includes nasal irritation, bleeding, coughing, sore throat and sinusitis. Patient advised that an escort is not required as no anesthesia is used in this procedure.  I instructed the patient that the study is done off therapy and instructed to stop PPI 7 days prior to the test, H2 blocker 3 days prior to test, and antacids 24 hours prior to test. Patient was also instructed to bring the recorder back to the  at endo suite once the study is completed. Patient was made aware that the procedure is performed by an NP/PA and interpreted at a later date by the physician. Patient was advised a COVID-19 PCR test is required within 3 days of the procedure. All questions were answered. The patient expressed understanding of these risks and is agreeable to proceed.

## 2022-04-28 NOTE — HISTORY OF PRESENT ILLNESS
[de-identified] : 81 year old male referred by Dr. Brandon Frankel to Dr. Loni Bullard for esophageal manometry and pH testing. Spoke with patient to discuss referral and procedures. Patient reports chronic hoarseness and throat clearing. He denies any heartburn, regurgitation, N/V. Patient reports a medication allergy to penicillin and tetracycline. He denies taking any anticoagulation. He denies the presence of a PPM/AICD/cardiac stents. He reports a normal endoscopy in 2018 and last colonoscopy in 2019, without any polyps.

## 2022-05-04 ENCOUNTER — APPOINTMENT (OUTPATIENT)
Dept: ORTHOPEDIC SURGERY | Facility: CLINIC | Age: 82
End: 2022-05-04
Payer: MEDICARE

## 2022-05-04 ENCOUNTER — NON-APPOINTMENT (OUTPATIENT)
Age: 82
End: 2022-05-04

## 2022-05-04 DIAGNOSIS — Z01.818 ENCOUNTER FOR OTHER PREPROCEDURAL EXAMINATION: ICD-10-CM

## 2022-05-04 DIAGNOSIS — M51.36 OTHER INTERVERTEBRAL DISC DEGENERATION, LUMBAR REGION: ICD-10-CM

## 2022-05-04 DIAGNOSIS — M16.12 UNILATERAL PRIMARY OSTEOARTHRITIS, LEFT HIP: ICD-10-CM

## 2022-05-04 DIAGNOSIS — Z01.812 ENCOUNTER FOR PREPROCEDURAL LABORATORY EXAMINATION: ICD-10-CM

## 2022-05-04 DIAGNOSIS — Z01.810 ENCOUNTER FOR PREPROCEDURAL CARDIOVASCULAR EXAMINATION: ICD-10-CM

## 2022-05-04 PROCEDURE — 73502 X-RAY EXAM HIP UNI 2-3 VIEWS: CPT

## 2022-05-04 PROCEDURE — 99214 OFFICE O/P EST MOD 30 MIN: CPT

## 2022-05-04 PROCEDURE — 72100 X-RAY EXAM L-S SPINE 2/3 VWS: CPT

## 2022-05-04 NOTE — REASON FOR VISIT
[Follow-Up Visit] : a follow-up visit for [Artificial Hip Joint] : artificial hip joint [Radiculopathy] : radiculopathy

## 2022-05-04 NOTE — PHYSICAL EXAM
[de-identified] : Patient is well nourished, well-developed, in no acute distress, with appropriate mood and affect. The patient is oriented to time, place, and person. Respirations are even and unlabored. Gait evaluation does not reveal a limp. There is no inguinal adenopathy. Examination of the contralateral hip shows normal range of motion, strength, no tenderness, and intact skin. The affected limb is well-perfused and showed 2+ dp/pt pulses, without skin lesions, shows a grossly normal motor and sensory examination. Examination of the hip shows a well healed surgical scar. Hip motion is full and painless from [0-90] degrees extension to flexion, [20] degrees adduction and [20] degrees abduction, and [15] degrees internal and [30] degrees external rotation. Leg lengths are approximately [equal]. Both hips are stable and muscle strength is normal with good strength with resisted abduction and adduction. Pedal pulses are palpable.  Examination of the lumbar spine reveals full range of motion without pain. There is no tenderness to palpation of the osseous structures or paravertebral soft tissues. There is no muscle spasm. Straight leg raise is negative bilaterally. [de-identified] : AP pelvis, AP hip, and lateral x-rays of the left hip were ordered and obtained in the office and demonstrate satisfactory position and alignment of the components are present. No signs of loosening are seen.\par \par AP and lateral radiographs of the lumbar spine were ordered and obtained in the office and demonstrate significant degenerative disc disease, as well as a grade 1 spondylolisthesis at the L5-S1 level but no or spondylolysis.

## 2022-05-04 NOTE — DISCUSSION/SUMMARY
[de-identified] : The patient is doing well after joint replacement surgery. Continue to be weight bearing as tolerated without restriction.  Daily home exercise program recommended for this.  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  The patient is not an appropriate candidate for surgical intervention at this time. An extensive discussion was conducted on the natural history of the disease and the variety of surgical and non-surgical options available to the patient including, but not limited to non-steroidal anti-inflammatory medications, steroid injections, physical therapy, maintenance of ideal body weight, and reduction of activity.  Recommended seeing either physiatry or spine surgery but he wants to try physical therapy first which was prescribed.  I offered prescription for NSAIDs as well.  He would like to try over-the-counter NSAIDs as needed for pain before he tries new prescription meloxicam\par The patient will schedule an appointment as needed.\par

## 2022-05-04 NOTE — HISTORY OF PRESENT ILLNESS
[de-identified] : This is very nice 81 year male  here for interim evaluation of left total hip arthroplasty. The patient reports good pain relief since surgery in the replaced joint and satisfactory restoration of function in terms of activities of daily living. The patient no longer requires an assistive device for ambulation, does not require pain medication, and completed postoperative physical therapy. They report unlimited activities of daily living and unlimited walking tolerance. The patient is thrilled with their progress from surgery and ultimate outcome. \par Also complaining of new complaint of left lower extremity pain sometimes right lower extremity pain.  Starts in his low back and radiate down the leg to the foot.  This is not associated with numbness or tingling or weakness.  No bowel or bladder incontinence.  Worse when he lies on the left side at night

## 2022-05-10 ENCOUNTER — APPOINTMENT (OUTPATIENT)
Dept: GASTROENTEROLOGY | Facility: HOSPITAL | Age: 82
End: 2022-05-10

## 2022-06-06 ENCOUNTER — RX RENEWAL (OUTPATIENT)
Age: 82
End: 2022-06-06

## 2022-06-15 ENCOUNTER — APPOINTMENT (OUTPATIENT)
Dept: PULMONOLOGY | Facility: CLINIC | Age: 82
End: 2022-06-15
Payer: MEDICARE

## 2022-06-15 ENCOUNTER — NON-APPOINTMENT (OUTPATIENT)
Age: 82
End: 2022-06-15

## 2022-06-15 VITALS
SYSTOLIC BLOOD PRESSURE: 110 MMHG | RESPIRATION RATE: 16 BRPM | BODY MASS INDEX: 22.73 KG/M2 | HEART RATE: 71 BPM | WEIGHT: 150 LBS | OXYGEN SATURATION: 98 % | DIASTOLIC BLOOD PRESSURE: 80 MMHG | TEMPERATURE: 96.3 F | HEIGHT: 68 IN

## 2022-06-15 PROCEDURE — 94010 BREATHING CAPACITY TEST: CPT

## 2022-06-15 PROCEDURE — 95012 NITRIC OXIDE EXP GAS DETER: CPT

## 2022-06-15 PROCEDURE — 99214 OFFICE O/P EST MOD 30 MIN: CPT | Mod: 25

## 2022-06-15 NOTE — HISTORY OF PRESENT ILLNESS
[FreeTextEntry1] : Mr. Davenport is a 81 year old male presenting to the office for a follow up visit for LPRD, hoarseness, SOHAIL, postnasal drip and RADs. His chief complaint is \par -s/p prostate surgery and hip replacement\par -he notes that he is going for a procedure for GERD with Loni Peralta\par -he notes that he has been exercising his core more\par -he notes feeling great\par -he denies SOB\par -he notes clearing his throat\par -he notes his bowels are regular \par -he notes his sense of smell and taste are normal \par -he notes using 20 mg Prilosec every other day as well as Pepcid AC 40 mg\par \par -patient denies any headaches, nausea, vomiting, fever, chills, sweats, chest pain, chest pressure, palpitations, coughing, wheezing, fatigue, diarrhea, constipation, dysphagia, myalgias, dizziness, leg swelling, leg pain, itchy eyes, itchy ears, heartburn, reflux or sour taste in the mouth\par

## 2022-06-15 NOTE — ADDENDUM
[FreeTextEntry1] : Documented by Aristeo Rothman acting as a scribe for Dr. Turner Sanchez on 06/15/2022.\par \par All medical record entries made by the Scribe were at my, Dr. Turner Sanchez's, direction and personally dictated by me on 06/15/2022. I have reviewed the chart and agree that the record accurately reflects my personal performance of the history, physical exam, assessment and plan. I have also personally directed, reviewed, and agree with the discharge instructions.

## 2022-06-15 NOTE — PROCEDURE
[FreeTextEntry1] : Feno was 11; a normal value being less than 25. Fractional exhaled nitric oxide (FENO) is regarded as a simple, noninvasive method for assessing eosinophilic airway inflammation. Produced by a variety of cells within the lung, nitric oxide (NO) concentrations are generally low in healthy individuals. However, high concentrations of NO appear to be involved in nonspecific host defense mechanisms and chronic inflammatory  diseases such as asthma. The American Thoracic Society (ATS) therefore recommended using FENO to aid in the diagnosis and monitoring of eosinophilic airway inflammation and asthma, and for identifying steroid responsive individuals whose chronic respiratory symptoms may be caused by airway inflammation \par \par PFT revealed normal flows, with a FEV1 of 2.52L, which is 96% of predicted, with a normal flow volume loop

## 2022-06-15 NOTE — ASSESSMENT
[FreeTextEntry1] : Mr. Davenport is 81 y.o M who has a history of RADs, allergies, GERD and OSAS, BPH, (+) IgE, Asthma, mild PAH on echo 2021.  He presents to the office today for a follow up.  He is vaccinated once (J&J) only, awaiting GI evaluation for reflux\par \par problem 1: RADs/mild intermittent asthma (quiet)\par -quiet at this point in time\par -no therapy at this point time \par -Asthma is  believed to be caused by inherited (genetic) and environmental factor, but its exact cause is unknown. Asthma may be triggered by allergens, lung infections, or irritants in the air. Asthma triggers are different for each person \par -Inhaler technique reviewed as well as oral hygiene techniques reviewed with patient. Avoidance of cold air, extremes of temperature, rescue inhaler should be used before exercise. Order of medication reviewed with patient. Recommended use of a cool mist humidifier in the bedroom. \par \par Problem 1A: Elevated IgE \par - candidate for Xolair \par -Xolair is a recombinant DNA- derived humanized IgG1K monoclonal antibody that selectively binds ot human immunoglobulin E (IgE). Xolair is produced by a Chinese hamster ovary cell suspension culture in nutrient medium containing the antibiotic gentamicin. Gentamicin is not detectable in the final product. Xolair is a sterile, white, preservative free, lyophilized powder contained in a single use vial that is reconstituted with sterile water for suspension. Side effects include: wheezing, tightness of the chest, trouble breathing, hives, skin rash, feeling anxious or light-headed, fainting, warmth or tingling under skin, or swelling of face, lips, or tongue \par \par Problem 1B Cardio (PAH) mild\par -complete yearly ECHO next 9/2022\par \par problem 2: allergic rhinitis \par -continue to use Qnasl or Flonase 1 sniff each nostril BID\par -continue to use Claritin OTC\par -s/p blood work to include: IgE level (+), eosinophil level (-), vitamin D level (-), food IgE level (+), and asthma profile (+)\par -Environmental measures for allergies were encouraged including mattress and pillow cover, air purifier, and environmental controls.\par \par problem 3: GERD (follow-up Loni Peralta)\par -continue to use Prilosec 20 mg QOD\par -EgD/pH monitor\par - continue Pepcid AC 40 mg QHS\par  -Add DGL pre-meal qHS \par -Recommend Alkalol Gargle PRN\par -continue to follow up with Dr. Huffman\par -Things to avoid including overeating, spicy foods, tight clothing, eating within three hours of bed, this list is not all inclusive. \par -For treatment of reflux, possible options discussed including diet control, H2 blockers, PPIs, as well as coating motility agents discussed as treatment options. Timing of meals and proximity of last meal to sleep were discussed. If symptoms persist, a formal gastrointestinal evaluation is needed.\par \par \par problem 4: OSAS\par - Patient is using and benefiting from his CPAP\par -he is being recommended to continue use of the CPAP machine  and has been compliant and tolerating it well. \par -he is recommended to use Oxy-Aid by [employer] in the interim\par -Discussed the risks/associations with coronary artery disease, atrial fibrillation, arrhythmia, memory loss, issues with concentration, stroke risk, hypertension, nocturia, chronic reflux/[place]’s esophagus some but not all inclusive. Treatment options discussed including CPAP/BiPAP machine, oral appliance, ProVent therapy, Oxy-Aid by [employer], new technologies, or positional sleep.\par \par problem 5: fatigue and use of supplements\par -continue baby aspirin QD\par -continue Co-Q 10 200 mg QD\par -continue L- Carnitine 500 mg BID\par \par problem 6: dysphonia\par -recommended to take Dolce Voce\par \par problem 7: tinnitus\par -recommended to try Quietus\par -recommended Breath-right strips QHS\par \par Problem 8: Health Maintenance/COVID19 Precautions:\par -Recommended Supplements:\par -SPM\par -Tumeric\par -Topricin\par \par - s/p covid 19 vaccine (Adalid & Adalid) (Refused Booster)\par -Covid 19 precaution, vaccine and booster discussed at length and recommended \par \par - Clean your hands often. Wash your hands often with soap and water for at least 20 seconds, especially after blowing your nose, coughing, or sneezing, or having been in a public place.\par - If soap and water are not available, use a hand  that contains at least 60% alcohol.\par - To the extent possible, avoid touching high-touch surfaces in public places - elevator buttons, door handles, handrails, handshaking with people, etc. Use a tissue or your sleeve to cover your hand or finger if you must touch something.\par - Wash your hands after touching surfaces in public places.\par Immune Support Recommendations:\par -OTC Vitamin C 500mg BID \par -OTC Quercetin 250-500mg BID \par -OTC Zinc 75-100mg per day \par -OTC Melatonin 1or 2mg a night \par -OTC Vitamin D 1-4000mg per day \par -OTC Tonic Water 8oz per day\par \par \par problem 9: health maintenance \par - rash / itch - Allergic profile - s/p blood work to include: IgE level, eosinophil level, vitamin D level, food IgE level, and asthma profile ([place])\par - Recommended to see Dermatologist - Dr. Mitch Boxer (past)\par -recommended Topricin cream for Derm \par -refused 2018 flu shot - recommended to get flu shot this year \par -recommended strep pneumonia vaccines: Prevnar-13 vaccine (up to date), followed by Pneumo vaccine 23 one year following (completed)\par -recommended early intervention for URIs\par -recommended regular osteoporosis evaluations\par -recommended early dermatological evaluations\par -recommended after the age of 50 to the age of 70, colonoscopy every 5 years \par \par \par F/U in 4 months \par He is encouraged to call with any changes, concerns, or questions

## 2022-06-20 ENCOUNTER — NON-APPOINTMENT (OUTPATIENT)
Age: 82
End: 2022-06-20

## 2022-06-20 DIAGNOSIS — Z01.818 ENCOUNTER FOR OTHER PREPROCEDURAL EXAMINATION: ICD-10-CM

## 2022-06-25 ENCOUNTER — RX RENEWAL (OUTPATIENT)
Age: 82
End: 2022-06-25

## 2022-06-25 ENCOUNTER — NON-APPOINTMENT (OUTPATIENT)
Age: 82
End: 2022-06-25

## 2022-06-27 ENCOUNTER — NON-APPOINTMENT (OUTPATIENT)
Age: 82
End: 2022-06-27

## 2022-06-27 LAB — SARS-COV-2 N GENE NPH QL NAA+PROBE: NOT DETECTED

## 2022-06-28 ENCOUNTER — APPOINTMENT (OUTPATIENT)
Dept: GASTROENTEROLOGY | Facility: HOSPITAL | Age: 82
End: 2022-06-28

## 2022-06-28 ENCOUNTER — OUTPATIENT (OUTPATIENT)
Dept: OUTPATIENT SERVICES | Facility: HOSPITAL | Age: 82
LOS: 1 days | Discharge: HOME | End: 2022-06-28
Payer: MEDICARE

## 2022-06-28 VITALS
HEIGHT: 68 IN | WEIGHT: 149.91 LBS | HEART RATE: 68 BPM | TEMPERATURE: 97 F | DIASTOLIC BLOOD PRESSURE: 74 MMHG | RESPIRATION RATE: 16 BRPM | OXYGEN SATURATION: 100 % | SYSTOLIC BLOOD PRESSURE: 134 MMHG

## 2022-06-28 DIAGNOSIS — Z98.890 OTHER SPECIFIED POSTPROCEDURAL STATES: Chronic | ICD-10-CM

## 2022-06-28 DIAGNOSIS — H26.9 UNSPECIFIED CATARACT: Chronic | ICD-10-CM

## 2022-06-28 DIAGNOSIS — Z87.828 PERSONAL HISTORY OF OTHER (HEALED) PHYSICAL INJURY AND TRAUMA: Chronic | ICD-10-CM

## 2022-06-28 DIAGNOSIS — R49.0 DYSPHONIA: ICD-10-CM

## 2022-06-28 PROCEDURE — 91010 ESOPHAGUS MOTILITY STUDY: CPT | Mod: 26

## 2022-06-28 PROCEDURE — 91037 ESOPH IMPED FUNCTION TEST: CPT | Mod: 26

## 2022-06-28 DEVICE — CATH VERSAFLEX Z PH: Type: IMPLANTABLE DEVICE | Status: FUNCTIONAL

## 2022-06-29 PROCEDURE — 91038 ESOPH IMPED FUNCT TEST > 1HR: CPT | Mod: 26

## 2022-07-05 ENCOUNTER — RX RENEWAL (OUTPATIENT)
Age: 82
End: 2022-07-05

## 2022-08-11 ENCOUNTER — RX RENEWAL (OUTPATIENT)
Age: 82
End: 2022-08-11

## 2022-08-29 ENCOUNTER — RX RENEWAL (OUTPATIENT)
Age: 82
End: 2022-08-29

## 2022-09-21 ENCOUNTER — APPOINTMENT (OUTPATIENT)
Dept: CARDIOLOGY | Facility: CLINIC | Age: 82
End: 2022-09-21

## 2022-09-23 ENCOUNTER — LABORATORY RESULT (OUTPATIENT)
Age: 82
End: 2022-09-23

## 2022-09-23 ENCOUNTER — APPOINTMENT (OUTPATIENT)
Dept: CARDIOLOGY | Facility: CLINIC | Age: 82
End: 2022-09-23

## 2022-09-23 ENCOUNTER — NON-APPOINTMENT (OUTPATIENT)
Age: 82
End: 2022-09-23

## 2022-09-23 VITALS
HEIGHT: 68 IN | HEART RATE: 68 BPM | TEMPERATURE: 98.5 F | WEIGHT: 158 LBS | BODY MASS INDEX: 23.95 KG/M2 | OXYGEN SATURATION: 98 % | SYSTOLIC BLOOD PRESSURE: 126 MMHG | DIASTOLIC BLOOD PRESSURE: 86 MMHG | RESPIRATION RATE: 16 BRPM

## 2022-09-23 PROCEDURE — 93000 ELECTROCARDIOGRAM COMPLETE: CPT

## 2022-09-23 PROCEDURE — 93306 TTE W/DOPPLER COMPLETE: CPT

## 2022-09-23 PROCEDURE — 99214 OFFICE O/P EST MOD 30 MIN: CPT | Mod: 25

## 2022-09-23 NOTE — ASSESSMENT
[FreeTextEntry1] : This is a 82 year year old male here today for follow up cardiac evaluation. \par He has a past medical history significant for  mitral valve prolapse with moderate mitral valve regurgitation, hypertension, murmur, status post left hip replacement surgery, dyspepsia, reactive airway disease\par \par \par CHIEF COMPLAINT:\par Today he is feeling generally well and does not have any complaints at this time.  He is currently prescribed aspirin 81 mg daily, fish oil tablets 1000 mg capsules daily and ramipril 5 mg daily.\par \par BLOOD PRESSURE:\par -BP is controlled in today's visit.\par \par -I have discussed the importance of maintaining good BP control and reviewed the newest guidelines with the patient while re-enforcing dietary sodium restrictions to no more than 2-3 g daily, DASH diet, life style modifications as well as the goal of maintaining ideal body weight with the patient today. I have advised the patient to avoid the use of over-the-counter medications/ supplements especially NSAIDS.\par \par I have reviewed with Mr. RAGLAND that serious health consequences can occur when blood pressure is not well controlled and the need for strict compliance with medication and that optimal control can significantly reduce the risk of cardiovascular disease stroke, heart attack and other organ damage. They verbally expressed understanding of the fore mentioned serious health consequences to me today.\par \par BLOOD WORK:\par -New blood work was done 09/23/2022 to evaluate lipid profile, CBC, BMP, hepatic function, A1C and TSH\par \par CHOLESTEROL CONTROL:\par -Patient will continue the advised  TLC diet and to continue follow-up for treatment of hyperlipidemia and repeat blood testing with diet and exercise. I have discussed different exercises and the importance of maintenance of optimal body weight. The importance of staying within guidelines and recommendations was stressed to the patient today and they acknowledged that they understand this to me verbally.\par \par  -Mr. RAGLAND was educated and advised that failure to follow-up with my medical recommendations to lower cholesterol can result in severe health consequences therefore, they will continuing a low saturated and low fat diet and to avoid excessive carbohydrates to help reduce triglycerides and that lowering LDL levels is associated with a significant decrease in serious cardiac events including but not limited to heart attack stroke and overall death. We will continue lipid lowering agents as advised based on blood test results and the patient understands to call if they develop severe muscle discomfort or if they have a reddish tinted discoloration in their urine.\par \par TESTING/REPORTS:\par -EKG done Sep 23, 2022 which demonstrated regular sinus rhythm with nonspecific ST-T wave changes, BPM of 68. \par \par -Echocardiogram done in the office 9/23/2022 and results are pending.\par \par -Electrocardiogram done March 9, 2022 demonstrate normal sinus rhythm rate 66 bpm and is otherwise unremarkable.\par \par -Echo Doppler examination done September 1, 2021 demonstrated bileaflet mitral valve prolapse and mild to moderate mitral valve regurgitation, mild to moderate tricuspid valve regurgitation, mild pulmonic valve regurgitation, satisfactory left ventricular function with ejection fraction of 65%.\par \par -He had a normal exercise stress test March 18, 2020.\par \par -Echo Doppler examination done September 1, 2021 demonstrated mitral valve prolapse with mild to moderate mitral valve regurgitation, mild to moderate tricuspid valve regurgitation, mild pulmonic valve regurgitation with normal left ventricular ejection fraction 65%.\par \par -Electrocardiogram done November 15, 2021 demonstrate normal sinus rhythm at a rate of 86 bpm is otherwise unremarkable.\par \par PLAN:\par -The patient will schedule an Exercise Stress Test rule out significant coronary artery disease.\par -He will continue with his current medications and will contact the office if he is having any complaints between now and their next follow up appointment.\par  \par I have discussed the plan of care with Mr. JOE RAGLAND  and he  will follow up in 3 months. He is compliant with all of his medications.\par \par The patient understands that aerobic exercises must be increased to minutes 4 times/week and a detailed discussion of lifestyle modification was done today. \par The patient has a good understanding of the diagnosis, treatment plan and lifestyle modification. \par He will contact me at the office for any questions with their care or any changes in their health status.\par \par \par Brice GUO

## 2022-09-23 NOTE — DISCUSSION/SUMMARY
[FreeTextEntry1] : Dr. Yao-(PRIOR VISIT and PMH WITH Dr. Yao): \par This is a 81-year-old male with past medical history significant for mitral valve prolapse with moderate mitral valve regurgitation, hypertension, murmur, status post left hip replacement surgery, dyspepsia, reactive airway disease, who comes in for preoperative cardiac clearance.  He denies chest pain, shortness of breath, dizziness or syncope.\par \par Electrocardiogram done March 9, 2022 demonstrate normal sinus rhythm rate 66 bpm and is otherwise unremarkable.\par \par The patient had blood work done with his primary care physician February 12/2022 which demonstrated cholesterol 155, HDL 55, triglycerides of 46, and LDL calculated 91 mg/dL.\par The patient will continue on his current dose of ramipril 5 mg daily his blood pressure seems to be under good control.  He will follow-up with his primary care physician.  He is currently hemodynamically stable and asymptomatic from a cardiac standpoint.\par \par Echo Doppler examination done September 1, 2021 demonstrated bileaflet mitral valve prolapse and mild to moderate mitral valve regurgitation, mild to moderate tricuspid valve regurgitation, mild pulmonic valve regurgitation, satisfactory left ventricular function with ejection fraction of 65%.\par \par Lipid panel done October 19, 2021 demonstrated cholesterol 178, HDL of 52, triglycerides of 54, non-HDL cholesterol 126, and LDL calculated 115 mg/dL with hemoglobin A1c of 5.8.\par \par He had a normal exercise stress test March 18, 2020.\par \par Echo Doppler examination done September 1, 2021 demonstrated mitral valve prolapse with mild to moderate mitral valve regurgitation, mild to moderate tricuspid valve regurgitation, mild pulmonic valve regurgitation with normal left ventricular ejection fraction 65%.\par \par Electrocardiogram done November 15, 2021 demonstrate normal sinus rhythm at a rate of 86 bpm is otherwise unremarkable.\par \par The patient understands that aerobic exercises must be increased to 40 minutes 4 times per week. A detailed discussion of lifestyle modification was done today. The patient has a good understanding of the diagnosis, and treatment plan. Lifestyle modification was also outlined.

## 2022-10-12 ENCOUNTER — APPOINTMENT (OUTPATIENT)
Dept: GASTROENTEROLOGY | Facility: CLINIC | Age: 82
End: 2022-10-12

## 2022-10-12 ENCOUNTER — OUTPATIENT (OUTPATIENT)
Dept: OUTPATIENT SERVICES | Facility: HOSPITAL | Age: 82
LOS: 1 days | End: 2022-10-12
Payer: MEDICARE

## 2022-10-12 ENCOUNTER — APPOINTMENT (OUTPATIENT)
Dept: ULTRASOUND IMAGING | Facility: CLINIC | Age: 82
End: 2022-10-12

## 2022-10-12 VITALS
BODY MASS INDEX: 23.64 KG/M2 | HEIGHT: 68 IN | RESPIRATION RATE: 18 BRPM | TEMPERATURE: 97.5 F | HEART RATE: 81 BPM | SYSTOLIC BLOOD PRESSURE: 138 MMHG | DIASTOLIC BLOOD PRESSURE: 89 MMHG | WEIGHT: 156 LBS | OXYGEN SATURATION: 97 %

## 2022-10-12 DIAGNOSIS — Z87.828 PERSONAL HISTORY OF OTHER (HEALED) PHYSICAL INJURY AND TRAUMA: Chronic | ICD-10-CM

## 2022-10-12 DIAGNOSIS — R14.0 ABDOMINAL DISTENSION (GASEOUS): ICD-10-CM

## 2022-10-12 DIAGNOSIS — Z98.890 OTHER SPECIFIED POSTPROCEDURAL STATES: Chronic | ICD-10-CM

## 2022-10-12 DIAGNOSIS — H26.9 UNSPECIFIED CATARACT: Chronic | ICD-10-CM

## 2022-10-12 PROCEDURE — 76700 US EXAM ABDOM COMPLETE: CPT | Mod: 26

## 2022-10-12 PROCEDURE — 99214 OFFICE O/P EST MOD 30 MIN: CPT

## 2022-10-12 PROCEDURE — 76700 US EXAM ABDOM COMPLETE: CPT

## 2022-10-12 NOTE — HISTORY OF PRESENT ILLNESS
[FreeTextEntry1] : 83 yo male diagnosed with LPRD by ENT, pt s/p bravo and manometry testing  in 06/2022 unrevealing, esophageal fluid noted in esophagus, not acidic. patient reports hoarseness, hot breath and flushing . Patient on low dose lppi and famotidine qhs without much difference in symptoms. no weight loss. normal bms. repors palpitations in abdomen at night\par \par s/p colonoscopy in 2019\par s/p egd in 2018\par \par

## 2022-10-12 NOTE — PHYSICAL EXAM
[Normal] : normal bowel sounds, non-tender, no masses, soft, no no hepato-splenomegaly [Bowel Sounds] : normal bowel sounds [Abdomen Tenderness] : non-tender [Abdomen Soft] : soft [No CVA Tenderness] : no CVA  tenderness [Abnormal Walk] : normal gait [No Clubbing, Cyanosis] : no clubbing or cyanosis of the fingernails [] : no rash [Oriented To Time, Place, And Person] : oriented to person, place, and time [de-identified] : distended

## 2022-10-12 NOTE — ASSESSMENT
[FreeTextEntry1] : 1. adominal distention and bloating\par \par plan; abdominal us\par        gas x as needed\par \par 2. ? bile reflux\par \par plan if new probiotic not helpful recommend carafate bid\par  multiple small meals

## 2022-10-26 ENCOUNTER — NON-APPOINTMENT (OUTPATIENT)
Age: 82
End: 2022-10-26

## 2022-10-26 ENCOUNTER — APPOINTMENT (OUTPATIENT)
Dept: PULMONOLOGY | Facility: CLINIC | Age: 82
End: 2022-10-26

## 2022-10-26 VITALS
DIASTOLIC BLOOD PRESSURE: 82 MMHG | TEMPERATURE: 98 F | BODY MASS INDEX: 23.64 KG/M2 | HEART RATE: 64 BPM | SYSTOLIC BLOOD PRESSURE: 140 MMHG | HEIGHT: 68 IN | RESPIRATION RATE: 18 BRPM | WEIGHT: 156 LBS | OXYGEN SATURATION: 98 %

## 2022-10-26 PROCEDURE — 95012 NITRIC OXIDE EXP GAS DETER: CPT

## 2022-10-26 PROCEDURE — 94010 BREATHING CAPACITY TEST: CPT

## 2022-10-26 PROCEDURE — 99214 OFFICE O/P EST MOD 30 MIN: CPT | Mod: 25

## 2022-10-26 NOTE — PHYSICAL EXAM
[No Acute Distress] : no acute distress [Normal Oropharynx] : normal oropharynx [II] : Mallampati Class: II [Normal Appearance] : normal appearance [No Neck Mass] : no neck mass [Normal S1, S2] : normal s1, s2 [Normal Rate/Rhythm] : normal rate/rhythm [No Murmurs] : no murmurs [No Resp Distress] : no resp distress [Clear to Auscultation Bilaterally] : clear to auscultation bilaterally [No Abnormalities] : no abnormalities [Benign] : benign [Normal Gait] : normal gait [No Cyanosis] : no cyanosis [No Clubbing] : no clubbing [No Edema] : no edema [FROM] : FROM [Normal Color/ Pigmentation] : normal color/ pigmentation [No Focal Deficits] : no focal deficits [Oriented x3] : oriented x3 [Normal Affect] : normal affect [TextBox_68] : I:E 1:3; Clear

## 2022-10-26 NOTE — HISTORY OF PRESENT ILLNESS
[FreeTextEntry1] : Mr. Davenport is a 82 year old male presenting to the office for a follow up visit for LPRD, hoarseness, SOHAIL, postnasal drip and RADs. His chief complaint is \par \par -he notes generally feeling good \par -he notes weight training and working with  twice a week for exercise \par -he notes bowels are good and regular \par -he notes awaiting ophthalmologist appointment\par -he notes s/p ENT evaluation at [facility] for ear congestion \par -he notes recommended BIO K substitute by ENT\par -he notes reflux is quiet with BIO K alternative \par -he notes currently off Prilosec and on 40 mg of pepcid \par -he notes dysphonia has improved \par -he notes s/p physical therapy for back pain that has now resolved \par \par -he denies any headaches, nausea, vomiting, fever, chills, sweats, chest pain, chest pressure, coughing, wheezing, palpitations, constipation, diarrhea, dizziness, dysphagia, heartburn, reflux, itchy eyes, itchy ears, leg swelling, leg pain, arthralgias, myalgias, or sour taste in the mouth.

## 2022-10-26 NOTE — ADDENDUM
[FreeTextEntry1] : Documented by Sloan Bucio acting as a scribe for Dr. Turner Sanchez on 10/26/2022 .\par \par All medical record entries made by the Scribe were at my, Dr. Turner Sanchez's, direction and personally dictated by me on 10/26/2022. I have reviewed the chart and agree that the record accurately reflects my personal performance of the history, physical exam, assessment and plan. I have also personally directed, reviewed, and agree with the discharge instructions.

## 2022-10-26 NOTE — PROCEDURE
[FreeTextEntry1] : ECHO (9.23.2022) revealed\par 1. Minimal mitral regurgitation \par 2. Mildly calcified and thickened aortic valve. \par 3. Normal left ventricular systolic function. No segmental wall motion abnormalities.\par 4. Mild tricuspid regurgitation.\par 5. Mild pulmonic regurgitation. Estimated pulmonary artery systolic pressure equals 36 mm Hg, assuming right atrial pressure equals 5 mm HG, consistent with borderline pulmonary pressures.\par \par PFT reveals normal flows, with an FEV1 of 2.59 L, which is 100% of predicted, with a normal flow volume loop. \par \par FENO was 10; a normal value being less than 25\par Fractional exhaled nitric oxide (FENO) is regarded as a simple, noninvasive method for assessing eosinophilic airway inflammation. Produced by a variety of cells within the lung, nitric oxide (NO) concentrations are generally low in healthy individuals. However, high concentrations of NO appear to be involved in nonspecific host defense mechanisms and chronic inflammatory diseases such as asthma. The American Thoracic Society (ATS) therefore has recommended using FENO to aid in the diagnosis and monitoring of eosinophilic airway inflammation and asthma, and for identifying steroid responsive individuals whose chronic respiratory symptoms may be caused by airway inflammation.

## 2022-10-26 NOTE — ASSESSMENT
[FreeTextEntry1] : Mr. Davenport is 82 y.o M who has a history of RADs, allergies, GERD and OSAS, BPH, (+) IgE, Asthma, mild PAH on echo 2021.  He presents to the office today for a follow up.  He is vaccinated once (J[employer]J) only, - improved LPR Sx w/ BIO K plus\par \par problem 1: RADs/mild intermittent asthma (quiet)\par -quiet at this point in time\par -no therapy at this point time \par -Asthma is  believed to be caused by inherited (genetic) and environmental factor, but its exact cause is unknown. Asthma may be triggered by allergens, lung infections, or irritants in the air. Asthma triggers are different for each person \par -Inhaler technique reviewed as well as oral hygiene techniques reviewed with patient. Avoidance of cold air, extremes of temperature, rescue inhaler should be used before exercise. Order of medication reviewed with patient. Recommended use of a cool mist humidifier in the bedroom. \par \par Problem 1A: Elevated IgE \par - candidate for Xolair \par -Xolair is a recombinant DNA- derived humanized IgG1K monoclonal antibody that selectively binds ot human immunoglobulin E (IgE). Xolair is produced by a Chinese hamster ovary cell suspension culture in nutrient medium containing the antibiotic gentamicin. Gentamicin is not detectable in the final product. Xolair is a sterile, white, preservative free, lyophilized powder contained in a single use vial that is reconstituted with sterile water for suspension. Side effects include: wheezing, tightness of the chest, trouble breathing, hives, skin rash, feeling anxious or light-headed, fainting, warmth or tingling under skin, or swelling of face, lips, or tongue \par \par Problem 1B Cardio (PAH) mild\par -complete yearly ECHO next 9/2023\par Pulmonary hypertension is a disorder of the pulmonary arteries. It is important to distinguish the difference between pulmonary arterial hypertension which is idiopathic versus secondary pulmonary hypertension which is related to heart disease being diastolic dysfunction or congestive heart failure or other forms of pulmonary hypertension. Diagnostics include an initial echocardiogram evaluating the pulmonary artery pressures. If this is abnormal, to proceed with a VQ scan as well as a CTPA, and an eventual right heart catheterization to absolutely confirm the echocardiogram findings. (No medication can be prescribed until the right heart catheterization). If present, the evaluation will include rheumatological blood testing, HIV testing, and potential evaluation for cirrhosis. Drug classes include: PED5 (Revatio, Adcirca); ETRA (Tracleer, Macitentan, Letairis); Soluble guanylate cyclase (Adempas); Prostacyclins (Uptravi, Tyavso, Ventavis, Remodulin, or Orenitram derivatives). \par \par problem 2: allergic rhinitis \par -continue to use Qnasl or Flonase 1 sniff each nostril BID\par -continue to use Claritin OTC\par -s/p blood work to include: IgE level (+), eosinophil level (-), vitamin D level (-), food IgE level (+), and asthma profile (+)\par -Environmental measures for allergies were encouraged including mattress and pillow cover, air purifier, and environmental controls.\par \par problem 3: GERD- controlled \par -continue BIO K \par -off Prilosec 20 mg QOD\par -EgD/pH monitor\par - continue Pepcid AC 40 mg QHS\par  -Add DGL pre-meal qHS \par -Recommend Alkalol Gargle PRN\par -continue to follow up with Dr. Huffman\par -Things to avoid including overeating, spicy foods, tight clothing, eating within three hours of bed, this list is not all inclusive. \par -For treatment of reflux, possible options discussed including diet control, H2 blockers, PPIs, as well as coating motility agents discussed as treatment options. Timing of meals and proximity of last meal to sleep were discussed. If symptoms persist, a formal gastrointestinal evaluation is needed.\par \par \par problem 4: OSAS\par - Patient is using and benefiting from his CPAP\par -he is being recommended to continue use of the CPAP machine  and has been compliant and tolerating it well. \par -he is recommended to use Oxy-Aid by [employer] in the interim\par -Discussed the risks/associations with coronary artery disease, atrial fibrillation, arrhythmia, memory loss, issues with concentration, stroke risk, hypertension, nocturia, chronic reflux/Terrazas’s esophagus some but not all inclusive. Treatment options discussed including CPAP/BiPAP machine, oral appliance, ProVent therapy, Oxy-Aid by [employer], new technologies, or positional sleep.\par \par problem 5: fatigue and use of supplements\par -continue baby aspirin QD\par -continue Co-Q 10 200 mg QD\par -continue L- Carnitine 500 mg BID\par \par problem 6: dysphonia\par -recommended to take Dolce Voce\par \par problem 7: tinnitus\par -recommended to try Quietus\par -recommended Breath-right strips QHS\par \par Problem 8: Health Maintenance/COVID19 Precautions:\par -Recommended Supplements:\par -SPM\par -Tumeric\par -Topricin\par \par - s/p covid 19 vaccine (Adalid & Adalid) (Refused Booster)\par -Covid 19 precaution, vaccine and booster discussed at length and recommended \par \par - Clean your hands often. Wash your hands often with soap and water for at least 20 seconds, especially after blowing your nose, coughing, or sneezing, or having been in a public place.\par - If soap and water are not available, use a hand  that contains at least 60% alcohol.\par - To the extent possible, avoid touching high-touch surfaces in public places - elevator buttons, door handles, handrails, handshaking with people, etc. Use a tissue or your sleeve to cover your hand or finger if you must touch something.\par - Wash your hands after touching surfaces in public places.\par Immune Support Recommendations:\par -OTC Vitamin C 500mg BID \par -OTC Quercetin 250-500mg BID \par -OTC Zinc 75-100mg per day \par -OTC Melatonin 1or 2mg a night \par -OTC Vitamin D 1-4000mg per day \par -OTC Tonic Water 8oz per day\par \par \par problem 9: health maintenance \par -recommended [employer] Stretches on YouTube \par - rash / itch - Allergic profile - s/p blood work to include: IgE level, eosinophil level, vitamin D level, food IgE level, and asthma profile (NC)\par - Recommended to see Dermatologist - Dr. Mitch Boxer (past)\par -recommended Topricin cream for Derm \par -refused 2018 flu shot - recommended to get flu shot this year \par -recommended strep pneumonia vaccines: Prevnar-13 vaccine (up to date), followed by Pneumo vaccine 23 one year following (completed)\par -recommended early intervention for URIs\par -recommended regular osteoporosis evaluations\par -recommended early dermatological evaluations\par -recommended after the age of 50 to the age of 70, colonoscopy every 5 years \par \par \par F/U in 4 months \par He is encouraged to call with any changes, concerns, or questions

## 2022-11-29 ENCOUNTER — NON-APPOINTMENT (OUTPATIENT)
Age: 82
End: 2022-11-29

## 2022-11-30 ENCOUNTER — APPOINTMENT (OUTPATIENT)
Dept: MRI IMAGING | Facility: CLINIC | Age: 82
End: 2022-11-30

## 2022-11-30 ENCOUNTER — APPOINTMENT (OUTPATIENT)
Dept: ORTHOPEDIC SURGERY | Facility: CLINIC | Age: 82
End: 2022-11-30

## 2022-11-30 ENCOUNTER — OUTPATIENT (OUTPATIENT)
Dept: OUTPATIENT SERVICES | Facility: HOSPITAL | Age: 82
LOS: 1 days | End: 2022-11-30
Payer: MEDICARE

## 2022-11-30 VITALS
HEIGHT: 68 IN | OXYGEN SATURATION: 98 % | TEMPERATURE: 97.6 F | BODY MASS INDEX: 23.49 KG/M2 | SYSTOLIC BLOOD PRESSURE: 138 MMHG | HEART RATE: 71 BPM | DIASTOLIC BLOOD PRESSURE: 86 MMHG | WEIGHT: 155 LBS

## 2022-11-30 DIAGNOSIS — Z00.00 ENCOUNTER FOR GENERAL ADULT MEDICAL EXAMINATION WITHOUT ABNORMAL FINDINGS: ICD-10-CM

## 2022-11-30 PROCEDURE — 99214 OFFICE O/P EST MOD 30 MIN: CPT

## 2022-11-30 PROCEDURE — 72148 MRI LUMBAR SPINE W/O DYE: CPT

## 2022-11-30 PROCEDURE — 72148 MRI LUMBAR SPINE W/O DYE: CPT | Mod: 26,MH

## 2022-11-30 NOTE — HISTORY OF PRESENT ILLNESS
[de-identified] : Mr. JOE RAGLAND  is a 82 year old male who presents with back stiffness and bilateral ankle tightness.  He has had low back tightness for 6 months that will wax and wane.  His ankle tightness is new. Denies any LE radicular symptoms.  Normal bowel and bladder control.   Denies any recent fevers, chills, sweats, weight loss, or infection.\par \par The patients past medical history, past surgical history, medications, allergies, and social history were reviewed by me today with the patient and documented accordingly.  In addition, the patient's family history, which is noncontributory to their visit, was also reviewed.\par

## 2022-11-30 NOTE — PHYSICAL EXAM
[de-identified] : Examination of the lumbar spine reveals no midline tenderness palpation, step-offs, or skin lesions. Decreased range of motion with respect to flexion, extension, lateral bending, and rotation. No tenderness to palpation of the sciatic notch. No tenderness palpation of the bilateral greater trochanters. No pain with passive internal/external rotation of the hips. No instability of bilateral lower extremities.  Negative IFRAH. Negative straight leg raise bilaterally. No bowstring. Negative femoral stretch. 5 out of 5 iliopsoas, hip abductors, hips adductors, quadriceps, hamstrings, gastrocsoleus, tibialis anterior, extensor hallucis longus, peroneals. Grossly intact sensation to light touch bilateral lower extremities. 1+ patellar and Achilles reflexes. Downgoing Babinski. No clonus. Intact proprioception. Palpable pulses. No skin lesion and no edema on the right and left lower extremities. [de-identified] : Review of his previous lumbar x-rays reveals multilevel spondylosis.  He does have apparent isthmic spondylolisthesis L5-S1.

## 2022-11-30 NOTE — DISCUSSION/SUMMARY
[de-identified] : We discussed further treatment options.  He appears to have some radicular complaints likely related to his L5-S1 segment.  He has tried physical therapy for this.  We briefly discussed epidural injections.  I recommended a lumbar MRI.  Follow-up afterwards.

## 2022-12-02 ENCOUNTER — NON-APPOINTMENT (OUTPATIENT)
Age: 82
End: 2022-12-02

## 2022-12-02 ENCOUNTER — APPOINTMENT (OUTPATIENT)
Dept: ORTHOPEDIC SURGERY | Facility: CLINIC | Age: 82
End: 2022-12-02

## 2022-12-02 VITALS
BODY MASS INDEX: 23.49 KG/M2 | SYSTOLIC BLOOD PRESSURE: 130 MMHG | HEIGHT: 68 IN | WEIGHT: 155 LBS | DIASTOLIC BLOOD PRESSURE: 80 MMHG | OXYGEN SATURATION: 97 % | HEART RATE: 76 BPM

## 2022-12-02 VITALS — TEMPERATURE: 97.1 F

## 2022-12-02 DIAGNOSIS — M48.07 SPINAL STENOSIS, LUMBOSACRAL REGION: ICD-10-CM

## 2022-12-02 DIAGNOSIS — M43.16 SPONDYLOLISTHESIS, LUMBAR REGION: ICD-10-CM

## 2022-12-02 DIAGNOSIS — M54.16 RADICULOPATHY, LUMBAR REGION: ICD-10-CM

## 2022-12-02 PROCEDURE — 99214 OFFICE O/P EST MOD 30 MIN: CPT

## 2022-12-02 NOTE — HISTORY OF PRESENT ILLNESS
[de-identified] : Mr. JOE RAGLAND  is a 82 year old male who presents to the office for a follow-up visit.  He is here to review his MRI results.

## 2022-12-02 NOTE — DISCUSSION/SUMMARY
[de-identified] : We discussed further treatment options both nonsurgical and surgical.  We reviewed his MRI.  He is already had follow-up on his renal cyst.  He would like to undergo a course of physical therapy.  He will contemplate epidural injections.  He will let me know of any changes or worsening of his symptoms.

## 2022-12-02 NOTE — PHYSICAL EXAM
[de-identified] : Examination of the lumbar spine reveals no midline tenderness palpation, step-offs, or skin lesions. Decreased range of motion with respect to flexion, extension, lateral bending, and rotation. No tenderness to palpation of the sciatic notch. No tenderness palpation of the bilateral greater trochanters. No pain with passive internal/external rotation of the hips. No instability of bilateral lower extremities.  Negative IFRAH. Negative straight leg raise bilaterally. No bowstring. Negative femoral stretch. 5 out of 5 iliopsoas, hip abductors, hips adductors, quadriceps, hamstrings, gastrocsoleus, tibialis anterior, extensor hallucis longus, peroneals. Grossly intact sensation to light touch bilateral lower extremities. 1+ patellar and Achilles reflexes. Downgoing Babinski. No clonus. Intact proprioception. Palpable pulses. No skin lesion and no edema on the right and left lower extremities. [de-identified] : Review of his previous lumbar x-rays reveals multilevel spondylosis.  He does have apparent isthmic spondylolisthesis L5-S1.\par \par Lumbar MRI reveals multilevel spondylosis.  Renal cyst.  L4-S1 stenosis.  L5-S1 spondylolisthesis.

## 2023-01-02 ENCOUNTER — EMERGENCY (EMERGENCY)
Facility: HOSPITAL | Age: 83
LOS: 1 days | Discharge: HOME | End: 2023-01-02
Attending: EMERGENCY MEDICINE
Payer: MEDICARE

## 2023-01-02 VITALS
SYSTOLIC BLOOD PRESSURE: 120 MMHG | TEMPERATURE: 99 F | OXYGEN SATURATION: 95 % | WEIGHT: 154.98 LBS | HEIGHT: 68 IN | HEART RATE: 84 BPM | DIASTOLIC BLOOD PRESSURE: 87 MMHG | RESPIRATION RATE: 18 BRPM

## 2023-01-02 VITALS
RESPIRATION RATE: 18 BRPM | SYSTOLIC BLOOD PRESSURE: 115 MMHG | OXYGEN SATURATION: 94 % | HEART RATE: 81 BPM | DIASTOLIC BLOOD PRESSURE: 77 MMHG | TEMPERATURE: 99 F

## 2023-01-02 DIAGNOSIS — Z98.890 OTHER SPECIFIED POSTPROCEDURAL STATES: Chronic | ICD-10-CM

## 2023-01-02 DIAGNOSIS — H26.9 UNSPECIFIED CATARACT: Chronic | ICD-10-CM

## 2023-01-02 DIAGNOSIS — Z87.828 PERSONAL HISTORY OF OTHER (HEALED) PHYSICAL INJURY AND TRAUMA: Chronic | ICD-10-CM

## 2023-01-02 LAB
FLUAV AG NPH QL: SIGNIFICANT CHANGE UP
FLUBV AG NPH QL: SIGNIFICANT CHANGE UP
RSV RNA NPH QL NAA+NON-PROBE: SIGNIFICANT CHANGE UP
SARS-COV-2 RNA SPEC QL NAA+PROBE: DETECTED

## 2023-01-02 PROCEDURE — 87637 SARSCOV2&INF A&B&RSV AMP PRB: CPT

## 2023-01-02 PROCEDURE — 99284 EMERGENCY DEPT VISIT MOD MDM: CPT | Mod: CS

## 2023-01-02 PROCEDURE — 99285 EMERGENCY DEPT VISIT HI MDM: CPT | Mod: 25

## 2023-01-02 PROCEDURE — 73030 X-RAY EXAM OF SHOULDER: CPT

## 2023-01-02 PROCEDURE — 71045 X-RAY EXAM CHEST 1 VIEW: CPT

## 2023-01-02 PROCEDURE — 71045 X-RAY EXAM CHEST 1 VIEW: CPT | Mod: 26

## 2023-01-02 PROCEDURE — 73030 X-RAY EXAM OF SHOULDER: CPT | Mod: 26,LT

## 2023-01-02 RX ORDER — DEXAMETHASONE 0.5 MG/5ML
6 ELIXIR ORAL ONCE
Refills: 0 | Status: COMPLETED | OUTPATIENT
Start: 2023-01-02 | End: 2023-01-02

## 2023-01-02 RX ORDER — LIDOCAINE 4 G/100G
1 CREAM TOPICAL ONCE
Refills: 0 | Status: COMPLETED | OUTPATIENT
Start: 2023-01-02 | End: 2023-01-02

## 2023-01-02 RX ORDER — IBUPROFEN 200 MG
400 TABLET ORAL ONCE
Refills: 0 | Status: COMPLETED | OUTPATIENT
Start: 2023-01-02 | End: 2023-01-02

## 2023-01-02 RX ADMIN — Medication 400 MILLIGRAM(S): at 05:18

## 2023-01-02 RX ADMIN — Medication 6 MILLIGRAM(S): at 05:18

## 2023-01-02 NOTE — ED PROVIDER NOTE - PATIENT PORTAL LINK FT
You can access the FollowMyHealth Patient Portal offered by [facility] by registering at the following website: [email or web address]. By joining [employer]’s FollowMyHealth portal, you will also be able to view your health information using other applications (apps) compatible with our system.

## 2023-01-02 NOTE — ED ADULT TRIAGE NOTE - CHIEF COMPLAINT QUOTE
sore throat, fever, unable to sleep began tonight; no cough or sob; recent flight from [place] on Friday

## 2023-01-02 NOTE — ED PROVIDER NOTE - PHYSICAL EXAMINATION
GENERAL: Awake. Alert. NAD. Well nourished.  HEENT: NC/AT, PERRL, EOMI, Conjunctiva pink, no scleral icterus. Airway patent. Moist mucous membranes. No pharyngeal or tonsillar erythema/exudates/edema.  LUNGS: CTAB. No wheezes or rales noted.  CARDIAC: Chest non-tender to palpation. RRR.  ABDOMEN: No masses noted. Soft, NT, ND, no rebound, no guarding.  BACK: No midline spinal tenderness, no CVA tenderness  EXT: No edema, no calf tenderness, distal pulses 2+ bilaterally. Decreased ROM at LUE with flexion and abduction secondary to pain. Full passive ROM of UE b/l.   NEURO: A&Ox3. Moving all extremities. Sensation and strength intact throughout.   SKIN: Warm and dry.   PSYCH: Normal affect.

## 2023-01-02 NOTE — ED ADULT NURSE NOTE - CHIEF COMPLAINT QUOTE
sore throat, fever, unable to sleep began tonight; no cough or sob; recent flight from [place] on Friday

## 2023-01-02 NOTE — ED PROVIDER NOTE - CLINICAL SUMMARY MEDICAL DECISION MAKING FREE TEXT BOX
82M PMH HTN presenting with a few hours sore throat, 1 day of fever 100.4, and 2 days of rhinorrhea, no cough/sob/chest pain. Given hx and physical, ddx includes but is not limited to viral syndrome, covid, flu, URI. Low concern for pneumonia given no sob/cough. Plan for xr, flu/covid swab, meds, likely dc with pcp f/u.

## 2023-01-02 NOTE — ED PROVIDER NOTE - NSFOLLOWUPINSTRUCTIONS_ED_ALL_ED_FT
Please follow-up with your primary care provider within 1 week.  Please return to the emergency department for any of the symptoms listed below.  For fever you may take acetaminophen 1000 mg every 6 hours as needed.    Viral Respiratory Infection    A viral respiratory infection is an illness that affects parts of the body used for breathing, like the lungs, nose, and throat. It is caused by a germ called a virus. Symptoms can include runny nose, coughing, sneezing, fatigue, body aches, sore throat, fever, or headache. Over the counter medicine can be used to manage the symptoms but the infection typically goes away on its own in 5 to 10 days.     SEEK IMMEDIATE MEDICAL CARE IF YOU HAVE ANY OF THE FOLLOWING SYMPTOMS: shortness of breath, chest pain, rash, fever over 10 days, or lightheadedness/dizziness.

## 2023-01-02 NOTE — ED ADULT NURSE NOTE - OBJECTIVE STATEMENT
Pt is a 81 y/o male presenting to the ED c/o URI. Pt endorses nasal congestion/ post nasal drip with 1 day of fever, weakness and sore throat. Pt denies recent sick contacts. Pt is A&Ox3, follows commands, speaks coherently, sensory/motor function intact, NAD noted. Pt denies chills, cough, SOB, chest pain, back pain, lightheadedness at this time.

## 2023-01-02 NOTE — ED PROVIDER NOTE - OBJECTIVE STATEMENT
82M PMH HTN presenting with a few hours sore throat, 1 day of fever 100.4, and 2 days of rhinorrhea, no cough/sob/chest pain. Pt reports feeling intermittently weak and unwell for the past week. Denies sick contacts. Denies abdominal pain, n/v/d/c. Denies headache, neck pain, back pain. Pt reports L bicep pain that started after lifting his luggage over head while boarding an airplane a few days ago. States arm pain is improving, but has slight decreased arm flexion and abduction secondary to pain. Pt took acetaminophen 1000mg about 4 hours ago.

## 2023-01-02 NOTE — ED PROVIDER NOTE - ATTENDING CONTRIBUTION TO CARE
Afebrile. Awake and Alert. Lungs CTA. Heart RRR. CN II-XII grossly intact. Moves all extremities without lateralization. Mouth: Oropharynx clear, uvula midline, s/p tonsillectomy, no anterior cervical lymphadenopathy. No drooling. No hoarseness.    s/s c/w viral URI  Decadron and Motrin for Sx support  CXR r/o PNA (although cough described more as attempt to clear post-nasal drip and lungs CTA)

## 2023-01-03 NOTE — ED POST DISCHARGE NOTE - DETAILS
Pt zaki coronado, is isolating feeling well and having no issues.  Discussed return precautions asked I fax over COVID results to his eye doctor so he can get his procedure next week.  Melody

## 2023-01-16 ENCOUNTER — OFFICE (OUTPATIENT)
Dept: URBAN - METROPOLITAN AREA CLINIC 27 | Facility: CLINIC | Age: 83
Setting detail: OPHTHALMOLOGY
End: 2023-01-16
Payer: MEDICARE

## 2023-01-16 DIAGNOSIS — H26.491: ICD-10-CM

## 2023-01-16 DIAGNOSIS — H43.813: ICD-10-CM

## 2023-01-16 DIAGNOSIS — H25.12: ICD-10-CM

## 2023-01-16 PROCEDURE — 92201 OPSCPY EXTND RTA DRAW UNI/BI: CPT | Performed by: OPHTHALMOLOGY

## 2023-01-16 PROCEDURE — 92004 COMPRE OPH EXAM NEW PT 1/>: CPT | Performed by: OPHTHALMOLOGY

## 2023-01-16 ASSESSMENT — REFRACTION_AUTOREFRACTION
OD_CYLINDER: +0.25
OS_SPHERE: -3.50
OS_AXIS: 111
OD_AXIS: 83
OS_CYLINDER: +1.00
OD_SPHERE: -2.75

## 2023-01-16 ASSESSMENT — REFRACTION_CURRENTRX
OD_AXIS: 93
OD_OVR_VA: 20/
OS_CYLINDER: +0.75
OD_OVR_VA: 20/
OS_SPHERE: -2.50
OS_AXIS: 3
OD_CYLINDER: +0.50
OS_CYLINDER: +0.75
OS_AXIS: 174
OD_SPHERE: -3.25
OD_SPHERE: -2.75
OD_AXIS: 45
OS_OVR_VA: 20/
OS_OVR_VA: 20/
OD_CYLINDER: +0.25
OS_SPHERE: -3.00

## 2023-01-16 ASSESSMENT — KERATOMETRY
OS_AXISANGLE_DEGREES: 123
OS_K2POWER_DIOPTERS: 45.25
OS_K1POWER_DIOPTERS: 44.25
OD_AXISANGLE_DEGREES: 147
OD_K2POWER_DIOPTERS: 45.25
OD_K1POWER_DIOPTERS: 44.25
METHOD_AUTO_MANUAL: AUTO

## 2023-01-16 ASSESSMENT — VISUAL ACUITY
OD_BCVA: 20/30-1
OS_BCVA: 20/20-1

## 2023-01-16 ASSESSMENT — AXIALLENGTH_DERIVED
OS_AL: 24.3236
OD_AL: 24.1693

## 2023-01-16 ASSESSMENT — SPHEQUIV_DERIVED
OD_SPHEQUIV: -2.625
OS_SPHEQUIV: -3

## 2023-01-16 ASSESSMENT — CONFRONTATIONAL VISUAL FIELD TEST (CVF)
OD_FINDINGS: FULL
OS_FINDINGS: FULL

## 2023-01-16 ASSESSMENT — TONOMETRY
OD_IOP_MMHG: 18
OS_IOP_MMHG: 19

## 2023-02-03 ENCOUNTER — NON-APPOINTMENT (OUTPATIENT)
Age: 83
End: 2023-02-03

## 2023-02-08 ENCOUNTER — APPOINTMENT (OUTPATIENT)
Dept: ORTHOPEDIC SURGERY | Facility: CLINIC | Age: 83
End: 2023-02-08
Payer: MEDICARE

## 2023-02-08 VITALS — DIASTOLIC BLOOD PRESSURE: 77 MMHG | HEART RATE: 73 BPM | SYSTOLIC BLOOD PRESSURE: 118 MMHG

## 2023-02-08 DIAGNOSIS — M25.512 PAIN IN LEFT SHOULDER: ICD-10-CM

## 2023-02-08 PROCEDURE — 99213 OFFICE O/P EST LOW 20 MIN: CPT

## 2023-02-08 PROCEDURE — 73030 X-RAY EXAM OF SHOULDER: CPT | Mod: LT

## 2023-02-08 NOTE — HISTORY OF PRESENT ILLNESS
[de-identified] : 82 year old male presents today with left shoulder pain x 6 weeks. He felt a "pop" in his shoulder lifting carryon bag on to the overhead bin. He had difficulty lifting his arm the day after. The pain is constant brought on with pushing, abduction, external rotation, lifting. He is taking Advil for pain with mild relief. He has been working out with a  exercises seems to be helpful. Mobiltity of the shoulder has improved, but still with pain overhead. Reviewed note from 2/8/2021, he was seen for left shoulder injury in the past which was treated with HEP.

## 2023-02-08 NOTE — DISCUSSION/SUMMARY
[de-identified] : 83 y/o male with left shoulder pain. \par \par Patient presents with left shoulder pain consistent with likely discomfort to the rotator cuff.  Symptoms are mild on today's examination, but consistent with strain. Given moderate resolution of early symptoms, unlikely catastrophic injury to the tendon. Discussed short-term and long-term outcomes as well as the goal of treatment to reduce pain and restore function. Nonsurgical treatment is typically first-line therapy that may take weeks to months to resolve symptoms; includes rest from overhead activities, NSAIDs, home exercise program versus physical therapy to restore normal strength/ROM/function of the shoulder, and possible corticosteroid injection.\par \par Recommendations: HEP given. Overhead activity rest/avoidance, ice, NSAIDs.\par \par Follow-up 6wks as needed for further evaluation.

## 2023-02-08 NOTE — PHYSICAL EXAM
[de-identified] : Oriented to time, place, person\par Mood: Normal\par Affect: Normal\par Appearance: Healthy, well appearing, no acute distress.\par Gait: Normal\par Assistive Devices: None\par \par Left shoulder exam:\par \par Inspection: No malalignment, No defects, No atrophy\par Skin: No masses, No lesions\par Neck: Negative Spurling, full ROM, no pain with ROM\par AROM: FF to 180, abduction to 90, ER to 60, IR to mid lumbar\par Painful arc ROM: Pain with abduction and ER. \par Tenderness: No bicipital tenderness, no tenderness to greater tuberosity/RTC insertion, +anterior shoulder/lesser tuberosity tenderness\par Strength: 4/5 ER, 5/5 IR in adduction, 4+/5 supraspinatus testing, negative Crossett's test\par AC joint: No TTP/pain with cross arm testing\par Biceps: Speed Negative, Yergason Negative \par Impingement test: +Rebollar, +Neer\par Vasc: 2+ radial pulse \par Stability: Stable \par Neuro: AIN, PIN, Ulnar nerve intact to motor\par Sensation: Intact to light touch throughout  [de-identified] : The following radiographs were ordered and read by me during this patients visit. I reviewed each radiograph in detail with the patient and discussed the findings as highlighted below.\par \par 3 views of left shoulder were obtained today, 02/08/2023, that show no acute fracture or dislocation. There is mild glenohumeral and mild AC joint degenerative change seen. Type I acromion. There is no significant malalignment. No significant other obvious osseous abnormality, otherwise unremarkable.

## 2023-02-08 NOTE — ADDENDUM
[FreeTextEntry1] : This note was written by Donna Sweeney on 02/08/2023 acting solely as a scribe for Dr. Varinder Melton.\par \par All medical record entries made by the Scribe were at my, Dr. Varinder Melton, direction and personally dictated by me on 02/08/2023. I have personally reviewed the chart and agree that the record accurately reflects my personal performance of the history, physical exam, assessment and plan.

## 2023-03-15 ENCOUNTER — APPOINTMENT (OUTPATIENT)
Dept: CARDIOLOGY | Facility: CLINIC | Age: 83
End: 2023-03-15
Payer: MEDICARE

## 2023-03-15 VITALS
WEIGHT: 156 LBS | DIASTOLIC BLOOD PRESSURE: 86 MMHG | HEIGHT: 68 IN | TEMPERATURE: 97.8 F | OXYGEN SATURATION: 99 % | SYSTOLIC BLOOD PRESSURE: 128 MMHG | BODY MASS INDEX: 23.64 KG/M2 | HEART RATE: 68 BPM | RESPIRATION RATE: 16 BRPM

## 2023-03-15 DIAGNOSIS — R06.09 OTHER FORMS OF DYSPNEA: ICD-10-CM

## 2023-03-15 PROCEDURE — 93015 CV STRESS TEST SUPVJ I&R: CPT

## 2023-03-15 PROCEDURE — 99213 OFFICE O/P EST LOW 20 MIN: CPT | Mod: 25

## 2023-03-15 NOTE — DISCUSSION/SUMMARY
[FreeTextEntry1] : This is a 82-year-old male with past medical history significant for mitral valve prolapse with moderate mitral valve regurgitation, hypertension, murmur, status post left hip replacement surgery, dyspepsia, reactive airway disease, who comes in for cardiac follow-up evaluation.  He denies chest pain, shortness of breath, dizziness or syncope.\par The patient had normal exercise stress test March 15, 2023.\par Blood work done January 25, 2023 demonstrated a cholesterol 160, HDL 47, triglycerides 79, LDL 97 and non-HDL cholesterol 113 mg/dL.  Patient's hemoglobin A1c was slightly elevated at 5.9.\par The patient will continue on his current diet and exercise program.  He has been stable on his medications.  He exercises on a regular basis and will continue to do so.\par Electrocardiogram done March 9, 2022 demonstrate normal sinus rhythm rate 66 bpm and is otherwise unremarkable.\par The patient had blood work done with his primary care physician February 12/2022 which demonstrated cholesterol 155, HDL 55, triglycerides of 46, and LDL calculated 91 mg/dL.\par The patient will continue on his current dose of ramipril 5 mg daily his blood pressure seems to be under good control.  He will follow-up with his primary care physician.  He is currently hemodynamically stable and asymptomatic from a cardiac standpoint.\par Echo Doppler examination done September 1, 2021 demonstrated bileaflet mitral valve prolapse and mild to moderate mitral valve regurgitation, mild to moderate tricuspid valve regurgitation, mild pulmonic valve regurgitation, satisfactory left ventricular function with ejection fraction of 65%.\par \par Lipid panel done October 19, 2021 demonstrated cholesterol 178, HDL of 52, triglycerides of 54, non-HDL cholesterol 126, and LDL calculated 115 mg/dL with hemoglobin A1c of 5.8.\par He had a normal exercise stress test March 18, 2020.\par Echo Doppler examination done September 1, 2021 demonstrated mitral valve prolapse with mild to moderate mitral valve regurgitation, mild to moderate tricuspid valve regurgitation, mild pulmonic valve regurgitation with normal left ventricular ejection fraction 65%.\par Electrocardiogram done November 15, 2021 demonstrate normal sinus rhythm at a rate of 86 bpm is otherwise unremarkable.\par \par The patient understands that aerobic exercises must be increased to 40 minutes 4 times per week. A detailed discussion of lifestyle modification was done today. The patient has a good understanding of the diagnosis, and treatment plan. Lifestyle modification was also outlined.

## 2023-03-15 NOTE — PHYSICAL EXAM
[Well Developed] : well developed [Well Nourished] : well nourished [No Acute Distress] : no acute distress [Normal Venous Pressure] : normal venous pressure [No Carotid Bruit] : no carotid bruit [Normal S1, S2] : normal S1, S2 [No Murmur] : no murmur [No Rub] : no rub [No Gallop] : no gallop heard [II] : a grade 2 [No Abnormalities] : the abdominal aorta was not enlarged and no bruit was heard [Clear Lung Fields] : clear lung fields [Good Air Entry] : good air entry [No Respiratory Distress] : no respiratory distress  [Soft] : abdomen soft [Non Tender] : non-tender [No Masses/organomegaly] : no masses/organomegaly [Normal Bowel Sounds] : normal bowel sounds [Normal Gait] : normal gait [No Edema] : no edema [No Cyanosis] : no cyanosis [No Clubbing] : no clubbing [No Varicosities] : no varicosities [No Rash] : no rash [No Skin Lesions] : no skin lesions [Moves all extremities] : moves all extremities [No Focal Deficits] : no focal deficits [Normal Speech] : normal speech [Alert and Oriented] : alert and oriented [Normal memory] : normal memory [General Appearance - Well Developed] : well developed [Normal Appearance] : normal appearance [Well Groomed] : well groomed [No Deformities] : no deformities [General Appearance - Well Nourished] : well nourished [General Appearance - In No Acute Distress] : no acute distress [Normal Conjunctiva] : the conjunctiva exhibited no abnormalities [Normal Oral Mucosa] : normal oral mucosa [Normal Jugular Venous A Waves Present] : normal jugular venous A waves present [Normal Oropharynx] : normal oropharynx [Normal Jugular Venous V Waves Present] : normal jugular venous V waves present [No Jugular Venous Merino A Waves] : no jugular venous merino A waves [Respiration, Rhythm And Depth] : normal respiratory rhythm and effort [Exaggerated Use Of Accessory Muscles For Inspiration] : no accessory muscle use [Auscultation Breath Sounds / Voice Sounds] : lungs were clear to auscultation bilaterally [Bowel Sounds] : normal bowel sounds [Abdomen Soft] : soft [Abnormal Walk] : normal gait [Nail Clubbing] : no clubbing of the fingernails [Cyanosis, Localized] : no localized cyanosis [Skin Color & Pigmentation] : normal skin color and pigmentation [Skin Turgor] : normal skin turgor [] : no rash [Oriented To Time, Place, And Person] : oriented to person, place, and time [Impaired Insight] : insight and judgment were intact [Affect] : the affect was normal [Mood] : the mood was normal [5th Left ICS - MCL] : palpated at the 5th LICS in the midclavicular line [Normal] : normal [No Precordial Heave] : no precordial heave was noted [Normal Rate] : normal [Rhythm Regular] : regular [Normal S1] : normal S1 [Normal S2] : normal S2 [I] : a grade 1 [2+] : left 2+ [No Pitting Edema] : no pitting edema present

## 2023-03-24 ENCOUNTER — RX RENEWAL (OUTPATIENT)
Age: 83
End: 2023-03-24

## 2023-04-10 RX ORDER — PNV NO.95/FERROUS FUM/FOLIC AC 28MG-0.8MG
1000 TABLET ORAL DAILY
Refills: 0 | Status: COMPLETED | COMMUNITY
Start: 2020-12-18 | End: 2023-04-10

## 2023-04-10 RX ORDER — LEVOTHYROXINE SODIUM 75 UG/1
75 TABLET ORAL
Refills: 0 | Status: ACTIVE | COMMUNITY
Start: 2023-04-10

## 2023-04-10 RX ORDER — GLUCOSA SU 2KCL/CHONDROITIN SU 500-400 MG
100 CAPSULE ORAL
Refills: 0 | Status: COMPLETED | COMMUNITY
Start: 2019-01-22 | End: 2023-04-10

## 2023-04-10 RX ORDER — FAMOTIDINE 40 MG/1
40 TABLET, FILM COATED ORAL TWICE DAILY
Qty: 60 | Refills: 0 | Status: COMPLETED | COMMUNITY
Start: 2022-03-15 | End: 2023-04-10

## 2023-04-10 RX ORDER — MUPIROCIN 20 MG/G
2 OINTMENT TOPICAL TWICE DAILY
Qty: 1 | Refills: 0 | Status: COMPLETED | COMMUNITY
Start: 2022-01-12 | End: 2023-04-10

## 2023-04-10 RX ORDER — SUCRALFATE 1 G/1
1 TABLET ORAL
Qty: 60 | Refills: 2 | Status: COMPLETED | COMMUNITY
Start: 2022-10-12 | End: 2023-04-10

## 2023-04-19 ENCOUNTER — APPOINTMENT (OUTPATIENT)
Dept: PULMONOLOGY | Facility: CLINIC | Age: 83
End: 2023-04-19
Payer: MEDICARE

## 2023-04-19 VITALS
HEIGHT: 69 IN | SYSTOLIC BLOOD PRESSURE: 120 MMHG | HEART RATE: 63 BPM | WEIGHT: 156 LBS | RESPIRATION RATE: 17 BRPM | TEMPERATURE: 97.6 F | OXYGEN SATURATION: 97 % | BODY MASS INDEX: 23.11 KG/M2 | DIASTOLIC BLOOD PRESSURE: 80 MMHG

## 2023-04-19 PROCEDURE — 99214 OFFICE O/P EST MOD 30 MIN: CPT | Mod: 25

## 2023-04-19 PROCEDURE — 94010 BREATHING CAPACITY TEST: CPT

## 2023-04-19 PROCEDURE — 94727 GAS DIL/WSHOT DETER LNG VOL: CPT

## 2023-04-19 PROCEDURE — 94729 DIFFUSING CAPACITY: CPT

## 2023-04-19 PROCEDURE — 95012 NITRIC OXIDE EXP GAS DETER: CPT

## 2023-04-19 NOTE — ADDENDUM
[FreeTextEntry1] : Documented by Sloan Bucio acting as a scribe for Dr. Turner Sanchez on 04/19/2023 .\par \par All medical record entries made by the Scribe were at my, Dr. Turner Sanchez's, direction and personally dictated by me on 04/19/2023. I have reviewed the chart and agree that the record accurately reflects my personal performance of the history, physical exam, assessment and plan. I have also personally directed, reviewed, and agree with the discharge instructions.

## 2023-04-19 NOTE — HISTORY OF PRESENT ILLNESS
[FreeTextEntry1] : Mr. Davenport is a 82 year old male presenting to the office for a follow up visit for LPRD, hoarseness, SOHAIL, postnasal drip and RADs. His chief complaint is \par \par -he notes generally feeling good \par -he notes energy level is stable \par -he notes s/p carotid ultrasound that was clear \par -he notes regular exercise \par -he denies sinus issues \par -he notes good quality of sleep \par -he notes globus pharyngeus and PND improved with breath right strips \par -he notes use of breath right strips instead of CPAP due to globus pharyngeus and PND\par -he denies snoring \par \par -he denies any headaches, nausea, emesis, fever, chills, sweats, chest pain, chest pressure, coughing, wheezing, palpitations, constipation, diarrhea, vertigo, dysphagia, heartburn, reflux, itchy eyes, itchy ears, leg swelling, arthralgias, myalgias, or sour taste in the mouth.

## 2023-04-19 NOTE — PROCEDURE
[FreeTextEntry1] : Full PFT reveals normal flows; FEV1 was 3.16 L which is 120% of predicted; normal lung volumes; normal diffusion at 23.7, which is 147% of predicted; normal flow volume loop.\par PFTs were performed to evaluate for SOB and asthma \par \par FENO was 18; a normal value being less than 25\par Fractional exhaled nitric oxide (FENO) is regarded as a simple, noninvasive method for assessing eosinophilic airway inflammation. Produced by a variety of cells within the lung, nitric oxide (NO) concentrations are generally low in healthy individuals. However, high concentrations of NO appear to be involved in nonspecific host defense mechanisms and chronic inflammatory diseases such as asthma. The American Thoracic Society (ATS) therefore has recommended using FENO to aid in the diagnosis and monitoring of eosinophilic airway inflammation and asthma, and for identifying steroid responsive individuals whose chronic respiratory symptoms may be caused by airway inflammation.

## 2023-04-19 NOTE — ASSESSMENT
[FreeTextEntry1] : Mr. Davenport is 82 y.o M who has a history of RADs, allergies, GERD and OSAS, BPH, (+) IgE, Asthma, mild PAH on echo 2021.  He presents to the office today for a follow up.  He is vaccinated once (J&J) only, - improved LPR Sx w/ BIO K plus (consistent) \par \par problem 1: RADs/mild intermittent asthma (quiet)\par -quiet at this point in time\par -no therapy at this point time \par -Asthma is  believed to be caused by inherited (genetic) and environmental factor, but its exact cause is unknown. Asthma may be triggered by allergens, lung infections, or irritants in the air. Asthma triggers are different for each person \par -Inhaler technique reviewed as well as oral hygiene techniques reviewed with patient. Avoidance of cold air, extremes of temperature, rescue inhaler should be used before exercise. Order of medication reviewed with patient. Recommended use of a cool mist humidifier in the bedroom. \par \par Problem 1A: Elevated IgE \par - candidate for Xolair \par -Xolair is a recombinant DNA- derived humanized IgG1K monoclonal antibody that selectively binds ot human immunoglobulin E (IgE). Xolair is produced by a Chinese hamster ovary cell suspension culture in nutrient medium containing the antibiotic gentamicin. Gentamicin is not detectable in the final product. Xolair is a sterile, white, preservative free, lyophilized powder contained in a single use vial that is reconstituted with sterile water for suspension. Side effects include: wheezing, tightness of the chest, trouble breathing, hives, skin rash, feeling anxious or light-headed, fainting, warmth or tingling under skin, or swelling of face, lips, or tongue \par \par Problem 1B Cardio (PAH) mild\par -complete yearly ECHO next 9/2023\par Pulmonary hypertension is a disorder of the pulmonary arteries. It is important to distinguish the difference between pulmonary arterial hypertension which is idiopathic versus secondary pulmonary hypertension which is related to heart disease being diastolic dysfunction or congestive heart failure or other forms of pulmonary hypertension. Diagnostics include an initial echocardiogram evaluating the pulmonary artery pressures. If this is abnormal, to proceed with a VQ scan as well as a CTPA, and an eventual right heart catheterization to absolutely confirm the echocardiogram findings. (No medication can be prescribed until the right heart catheterization). If present, the evaluation will include rheumatological blood testing, HIV testing, and potential evaluation for cirrhosis. Drug classes include: PED5 (Revatio, Adcirca); ETRA (Tracleer, Macitentan, Letairis); Soluble guanylate cyclase (Adempas); Prostacyclins (Uptravi, Tyavso, Ventavis, Remodulin, or Orenitram derivatives). \par \par problem 2: allergic rhinitis \par -continue to use Qnasl or Flonase 1 sniff each nostril BID\par -continue to use Claritin OTC\par -s/p blood work to include: IgE level (+), eosinophil level (-), vitamin D level (-), food IgE level (+), and asthma profile (+)\par -Environmental measures for allergies were encouraged including mattress and pillow cover, air purifier, and environmental controls.\par \par problem 3: GERD- controlled \par -continue BIO K \par -off Prilosec 20 mg QOD\par -EgD/pH monitor\par - continue Pepcid AC 40 mg QHS\par  -Add DGL pre-meal qHS \par -Recommend Alkalol Gargle PRN\par -continue to follow up with Dr. Huffman\par -Things to avoid including overeating, spicy foods, tight clothing, eating within three hours of bed, this list is not all inclusive. \par -For treatment of reflux, possible options discussed including diet control, H2 blockers, PPIs, as well as coating motility agents discussed as treatment options. Timing of meals and proximity of last meal to sleep were discussed. If symptoms persist, a formal gastrointestinal evaluation is needed.\par \par \par problem 4: OSAS\par -continue use of breath rite strips\par -recommended Somnifix \par -repeat home sleep study \par -off CPAP\par -he is being recommended to continue use of the CPAP machine  and has been compliant and tolerating it well. \par -he is recommended to use Oxy-Aid by [employer] in the interim\par -Discussed the risks/associations with coronary artery disease, atrial fibrillation, arrhythmia, memory loss, issues with concentration, stroke risk, hypertension, nocturia, chronic reflux/Terrazas’s esophagus some but not all inclusive. Treatment options discussed including CPAP/BiPAP machine, oral appliance, ProVent therapy, Oxy-Aid by [employer], new technologies, or positional sleep.\par -Sleep apnea is associated with adverse clinical consequences which can affect most organ systems.\par Cardiovascular disease risk includes arrhythmias, atrial fibrillation, hypertension, coronary artery disease, and stroke. Metabolic disorders include diabetes type 2, non-alcoholic fatty liver disease. Mood disorder especially depression; and cognitive decline especially in the elderly. Associations with chronic reflux/Terrazas’s esophagus some but not all inclusive.\par -Reasons include arousal consistent with hypopnea; respiratory events most prominent in REM sleep or supine position; therefore sleep staging and body position are important for accurate diagnosis and estimation of AHI. \par \par problem 5: fatigue and use of supplements\par -continue baby aspirin QD\par -continue Co-Q 10 200 mg QD\par -continue L- Carnitine 500 mg BID\par \par problem 6: dysphonia\par -recommended to take Dolce Voce\par \par problem 7: tinnitus\par -recommended to try Quietus\par -recommended Breath-right strips QHS\par \par Problem 8: Health Maintenance/COVID19 Precautions:\par -Recommended Supplements:\par -SPM\par -Tumeric\par -Topricin\par \par - s/p covid 19 vaccine (Adalid & Adalid) (Refused Booster)\par -Covid 19 precaution, vaccine and booster discussed at length and recommended \par \par - Clean your hands often. Wash your hands often with soap and water for at least 20 seconds, especially after blowing your nose, coughing, or sneezing, or having been in a public place.\par - If soap and water are not available, use a hand  that contains at least 60% alcohol.\par - To the extent possible, avoid touching high-touch surfaces in public places - elevator buttons, door handles, handrails, handshaking with people, etc. Use a tissue or your sleeve to cover your hand or finger if you must touch something.\par - Wash your hands after touching surfaces in public places.\par Immune Support Recommendations:\par -OTC Vitamin C 500mg BID \par -OTC Quercetin 250-500mg BID \par -OTC Zinc 75-100mg per day \par -OTC Melatonin 1or 2mg a night \par -OTC Vitamin D 1-4000mg per day \par -OTC Tonic Water 8oz per day\par \par \par problem 9: health maintenance \par -recommended [facility] Stretches on YouTube \par - rash / itch - Allergic profile - s/p blood work to include: IgE level, eosinophil level, vitamin D level, food IgE level, and asthma profile (NC)\par - Recommended to see Dermatologist - Dr. Mitch Boxer (past)\par -recommended Topricin cream for Derm \par -refused 2018 flu shot - recommended to get flu shot this year \par -recommended strep pneumonia vaccines: Prevnar-13 vaccine (up to date), followed by Pneumo vaccine 23 one year following (completed)\par -recommended early intervention for URIs\par -recommended regular osteoporosis evaluations\par -recommended early dermatological evaluations\par -recommended after the age of 50 to the age of 70, colonoscopy every 5 years \par \par \par F/U in 4 months \par He is encouraged to call with any changes, concerns, or questions

## 2023-04-19 NOTE — REASON FOR VISIT
[Follow-Up] : a follow-up visit [FreeTextEntry1] : borderline PAH, LPRD, hoarseness, SOHAIL, postnasal drip and RADs

## 2023-05-03 ENCOUNTER — APPOINTMENT (OUTPATIENT)
Dept: ORTHOPEDIC SURGERY | Facility: CLINIC | Age: 83
End: 2023-05-03
Payer: MEDICARE

## 2023-05-03 DIAGNOSIS — Z96.642 PRESENCE OF LEFT ARTIFICIAL HIP JOINT: ICD-10-CM

## 2023-05-03 PROCEDURE — 99213 OFFICE O/P EST LOW 20 MIN: CPT

## 2023-05-03 PROCEDURE — 73502 X-RAY EXAM HIP UNI 2-3 VIEWS: CPT

## 2023-05-03 NOTE — HISTORY OF PRESENT ILLNESS
[de-identified] : This is very nice 82-year-old gentleman here for interim evaluation of left total hip arthroplasty. The patient reports good pain relief since surgery in the replaced joint and satisfactory restoration of function in terms of activities of daily living. The patient no longer requires an assistive device for ambulation, does not require pain medication, and completed postoperative physical therapy. They report unlimited activities of daily living and unlimited walking tolerance. The patient is thrilled with their progress from surgery and ultimate outcome. \par

## 2023-05-03 NOTE — HISTORY OF PRESENT ILLNESS
[de-identified] : This is very nice 82-year-old gentleman here for interim evaluation of left total hip arthroplasty. The patient reports good pain relief since surgery in the replaced joint and satisfactory restoration of function in terms of activities of daily living. The patient no longer requires an assistive device for ambulation, does not require pain medication, and completed postoperative physical therapy. They report unlimited activities of daily living and unlimited walking tolerance. The patient is thrilled with their progress from surgery and ultimate outcome. \par

## 2023-05-03 NOTE — PHYSICAL EXAM
[de-identified] : Patient is well nourished, well-developed, in no acute distress, with appropriate mood and affect. The patient is oriented to time, place, and person. Respirations are even and unlabored. Gait evaluation does not reveal a limp. There is no inguinal adenopathy. Examination of the contralateral hip shows normal range of motion, strength, no tenderness, and intact skin. The affected limb is well-perfused and showed 2+ dp/pt pulses, without skin lesions, shows a grossly normal motor and sensory examination. Examination of the hip shows a well healed surgical scar. Hip motion is full and painless from 0-90 degrees extension to flexion, 20 degrees adduction and 20 degrees abduction, and 15 degrees internal and 30 degrees external rotation. Leg lengths are approximately equal. Both hips are stable and muscle strength is normal with good strength with resisted abduction and adduction. Pedal pulses are palpable. [de-identified] : AP pelvis, AP hip, and lateral x-rays of the left hip were ordered and obtained in the office and demonstrate satisfactory position and alignment of the components are present. No signs of loosening are seen.

## 2023-05-03 NOTE — PHYSICAL EXAM
[de-identified] : Patient is well nourished, well-developed, in no acute distress, with appropriate mood and affect. The patient is oriented to time, place, and person. Respirations are even and unlabored. Gait evaluation does not reveal a limp. There is no inguinal adenopathy. Examination of the contralateral hip shows normal range of motion, strength, no tenderness, and intact skin. The affected limb is well-perfused and showed 2+ dp/pt pulses, without skin lesions, shows a grossly normal motor and sensory examination. Examination of the hip shows a well healed surgical scar. Hip motion is full and painless from 0-90 degrees extension to flexion, 20 degrees adduction and 20 degrees abduction, and 15 degrees internal and 30 degrees external rotation. Leg lengths are approximately equal. Both hips are stable and muscle strength is normal with good strength with resisted abduction and adduction. Pedal pulses are palpable. [de-identified] : AP pelvis, AP hip, and lateral x-rays of the left hip were ordered and obtained in the office and demonstrate satisfactory position and alignment of the components are present. No signs of loosening are seen.

## 2023-05-03 NOTE — DISCUSSION/SUMMARY
[de-identified] : The patient is doing well after left total hip arthroplasty. Continue to be weight bearing as tolerated without restriction. Daily home exercise program recommended. Follow up is recommended every 5 years for long-term monitoring

## 2023-05-03 NOTE — DISCUSSION/SUMMARY
[de-identified] : The patient is doing well after left total hip arthroplasty. Continue to be weight bearing as tolerated without restriction. Daily home exercise program recommended. Follow up is recommended every 5 years for long-term monitoring

## 2023-05-16 ENCOUNTER — LABORATORY RESULT (OUTPATIENT)
Age: 83
End: 2023-05-16

## 2023-05-29 ENCOUNTER — NON-APPOINTMENT (OUTPATIENT)
Age: 83
End: 2023-05-29

## 2023-05-31 ENCOUNTER — NON-APPOINTMENT (OUTPATIENT)
Age: 83
End: 2023-05-31

## 2023-06-05 ENCOUNTER — APPOINTMENT (OUTPATIENT)
Dept: ORTHOPEDIC SURGERY | Facility: CLINIC | Age: 83
End: 2023-06-05
Payer: MEDICARE

## 2023-06-05 VITALS — WEIGHT: 156 LBS | BODY MASS INDEX: 23.64 KG/M2 | HEIGHT: 68 IN

## 2023-06-05 DIAGNOSIS — M17.11 UNILATERAL PRIMARY OSTEOARTHRITIS, RIGHT KNEE: ICD-10-CM

## 2023-06-05 PROCEDURE — 73564 X-RAY EXAM KNEE 4 OR MORE: CPT | Mod: RT

## 2023-06-05 PROCEDURE — 99213 OFFICE O/P EST LOW 20 MIN: CPT

## 2023-06-06 PROBLEM — M17.11 PRIMARY OSTEOARTHRITIS OF RIGHT KNEE: Status: ACTIVE | Noted: 2023-06-06

## 2023-06-06 NOTE — ADDENDUM
[FreeTextEntry1] : This note was written by Donna Sweeney on 06/05/2023 acting solely as a scribe for Dr. Varinder Melton.\par \par All medical record entries made by the Scribe were at my, Dr. Varinder Melton, direction and personally dictated by me on 06/05/2023. I have personally reviewed the chart and agree that the record accurately reflects my personal performance of the history, physical exam, assessment and plan.

## 2023-06-06 NOTE — HISTORY OF PRESENT ILLNESS
[de-identified] : 82 year old male presents today with right knee pain x 20-30 years. No injury reported. Patient states that he has limited ROM in the knee and recently has been having knee pain with prolonged walking. Takes pain Advil. Patient is very active and goes to the gym able to exercise with out issue. Localizes pain to the lateral aspect of the knee. Denies buckling, catching, numbness or tingling. Recalls being told that he has a partial MCL tear 1960. He recently had left hip replacement with Dr. Larose  November 2021.

## 2023-06-06 NOTE — PHYSICAL EXAM
[de-identified] : Oriented to time, place, person\par Mood: Normal\par Affect: Normal\par Appearance: Healthy, well appearing, no acute distress.\par Gait: Normal\par Assistive Devices: None\par \par Right Knee Exam:\par \par Skin: Clean, dry, intact\par Inspection: No obvious malalignment, no masses, no swelling, mild effusion\par Pulses: 2+ DP/PT pulses \par ROM: 0-130 degrees of flexion. No pain with deep knee flexion/extension.\par Tenderness: No MJLT. No LJLT. No pain over the patella facets. No pain to the quadriceps tendon. No pain to the patella tendon. No posterior knee tenderness.\par Stability: Stable to varus, valgus. Negative Lachman testing. Negative anterior drawer, negative posterior drawer.\par Strength: 5/5 Q/H/TA/GS/EHL, without atrophy\par Neuro: Intact to light touch throughout, DTRs normal\par Additional Tests: Negative Juan's test, Negative patellar grind test  [de-identified] : The following radiographs were ordered and read by me during this patients visit. I reviewed each radiograph in detail with the patient and discussed the findings as highlighted below. \par \par 4 views of the right knee were obtained today, 06/05/2023, that show no acute fracture or dislocation. There is moderate mild, moderate lateral and moderate patellofemoral degenerative changes seen. There is no significant malalignment. No significant other obvious osseous abnormality, otherwise unremarkable.

## 2023-06-06 NOTE — DISCUSSION/SUMMARY
[de-identified] : 81 y/o male with right knee osteoarthritis. \par \par I discussed the treatment of degenerative arthritis with the patient at length today, as well as the chronic degenerative process and likely future progression of the disease. Pain may be related to the bony degenerative changes seen on XR imaging, or the associated degeneration to the soft tissues within the knee joint, including cartilage, meniscus, or ligamentous structures.  I described the spectrum of treatment options available including nonoperative modalities to total joint arthroplasty. Noninvasive and nonoperative treatment modalities include weight reduction, activity modification with low impact exercise, PRN use of acetaminophen or anti-inflammatory medication, natural anti-inflammatory supplements, glucosamine/chondroitin, and physical therapy (for strengthening and conditioning). More invasive treatments can include injection therapies; including cortisone, hyaluronic acid (visco-supplementation), or platelet-rich-plasma (PRP) injections. Definitive treatment could also include future total joint arthroplasty if symptoms persist and cause prolonged disability or interference with activities of daily living. \par \par The risks and benefits of each treatment option was discussed and all questions were answered. At this time recommendations are for conservative and symptomatic care as detailed above with impact loading activity restriction, low impact exercise and avoidance of strenuous activity. \par \par Other recommendations include OTC NSAIDs or acetaminophen (if tolerated/able), with application of ice to the knee 2-3x daily for 20 minutes or after periods of activity. \par \par Follow up as needed.

## 2023-06-08 ENCOUNTER — NON-APPOINTMENT (OUTPATIENT)
Age: 83
End: 2023-06-08

## 2023-07-05 ENCOUNTER — APPOINTMENT (OUTPATIENT)
Dept: ORTHOPEDIC SURGERY | Facility: CLINIC | Age: 83
End: 2023-07-05
Payer: MEDICARE

## 2023-07-05 VITALS — BODY MASS INDEX: 23.64 KG/M2 | HEIGHT: 68 IN | WEIGHT: 156 LBS

## 2023-07-05 PROCEDURE — 20610 DRAIN/INJ JOINT/BURSA W/O US: CPT | Mod: RT

## 2023-07-05 PROCEDURE — 99214 OFFICE O/P EST MOD 30 MIN: CPT | Mod: 25

## 2023-07-05 NOTE — PHYSICAL EXAM
[de-identified] : Patient is well nourished, well-developed, in no acute distress, with appropriate mood and affect. The patient is oriented to time, place, and person. Respirations are even and unlabored. Gait evaluation does reveal a limp. There is no inguinal adenopathy. Examination of the contralateral knee shows normal range of motion, strength, no tenderness, and intact skin. The affected limb is well-perfused, without skin lesions, shows a grossly normal motor and sensory examination. Knee motion is significantly reduced and does cause significant pain. The knee moves from 0 to 125 degrees. The knee is stable within that range-of-motion to AP and ML stress. The alignment of the knee is 5 degrees varus. Muscle strength is normal. Pedal pulses are palpable. Hip examination was negative.\par  [de-identified] : Long standing knee, AP knee, lateral knee, and patellar views of the right knee were brought in by the patient which I reviewed and demonstrate degenerative joint disease of the knee with joint space narrowing, osteophyte formation, and subchondral sclerosis.

## 2023-07-05 NOTE — DISCUSSION/SUMMARY
[de-identified] : The patient has right knee osteoarthritis. They are not an appropriate candidate for surgical intervention at this time. An extensive discussion was conducted on the natural history of the disease and the variety of surgical and non-surgical options available to the patient including, but not limited to non-steroidal anti-inflammatory medications, steroid injections, physical therapy, maintenance of ideal body weight, and reduction of activity. I recommended a prescription of Mobic but the patient would prefer to use OTC NSAIDs at this time. The patient will schedule an appointment as needed. \par \par Recommendation: Trial of Visco supplementation. We'll obtain preauthorization prior to injection therapy. \par **NB: Medication was Issued under circumstances where the provider (Dr. Eliseo Larose) reasonably determined that it would be impractical for the patient to obtain substances prescribed by electronic prescription (e-prescribe) given need for pre-authorization, and such delay would adversely impact the patient's medical condition. An exception letter will be mailed to the [facility] during the authorization process.\par Followup: Once approved for injection therapy. \par \par Injection: right knee joint. \par \par Indication: Osteoarthritis. \par \par A discussion was had with the patient regarding this procedure and all questions were answered. All risks, benefits and alternatives were discussed. These included but were not limited to bleeding, infection, and allergic reaction. Alcohol was used to clean the skin, and betadine was used to sterilize and prep the area in the anterior-medial aspect of the knee. Ethyl chloride spray was then used as a topical anesthetic. A 22-gauge needle was used to inject 2 cc of Euflexxa into the knee with ease. A sterile bandage was then applied. The patient tolerated the procedure well and there were no complications. \par \par Lot #:  [id number]\par Exp: 05/2024\par \par The INJECTION # one  of three Euflexxa injections was given today under sterile conditions into the right knee joint without complication (see procedure note). I again discussed the role of activity modification/icing following the injection to treat any local irritation from the injection. \par \par

## 2023-07-05 NOTE — HISTORY OF PRESENT ILLNESS
[de-identified] : This is very nice 82-year-old gentleman experiencing chronic right knee pain, which is severe in intensity. The pain substantially limits activities of daily living. Walking tolerance is reduced.  He has known right knee osteoarthritis.  The patient denies any radiation of the pain to the feet and it is not associated with numbness, tingling, or weakness.

## 2023-07-14 ENCOUNTER — APPOINTMENT (OUTPATIENT)
Dept: ORTHOPEDIC SURGERY | Facility: CLINIC | Age: 83
End: 2023-07-14
Payer: MEDICARE

## 2023-07-14 PROCEDURE — 20610 DRAIN/INJ JOINT/BURSA W/O US: CPT | Mod: RT

## 2023-07-14 NOTE — PROCEDURE
[de-identified] : Injection: right knee joint. \par \par Indication: Osteoarthritis. \par \par A discussion was had with the patient regarding this procedure and all questions were answered. All risks, benefits and alternatives were discussed. These included but were not limited to bleeding, infection, and allergic reaction. Alcohol was used to clean the skin, and betadine was used to sterilize and prep the area in the anterior-medial aspect of the knee. Ethyl chloride spray was then used as a topical anesthetic. A 22-gauge needle was used to inject 2 cc of Euflexxa into the knee with ease. A sterile bandage was then applied. The patient tolerated the procedure well and there were no complications. \par \par Lot #: [id number]\par Exp: 05/2024\par \par The INJECTION # two of three Euflexxa injections was given today under sterile conditions into the right knee joint without complication (see procedure note). I again discussed the role of activity modification/icing following the injection to treat any local irritation from the injection.

## 2023-07-21 ENCOUNTER — APPOINTMENT (OUTPATIENT)
Dept: ORTHOPEDIC SURGERY | Facility: CLINIC | Age: 83
End: 2023-07-21
Payer: MEDICARE

## 2023-07-21 DIAGNOSIS — M17.11 UNILATERAL PRIMARY OSTEOARTHRITIS, RIGHT KNEE: ICD-10-CM

## 2023-07-21 PROCEDURE — 20610 DRAIN/INJ JOINT/BURSA W/O US: CPT | Mod: RT

## 2023-07-21 NOTE — PROCEDURE
[de-identified] : Injection: right knee joint. \par \par Indication: Osteoarthritis. \par \par A discussion was had with the patient regarding this procedure and all questions were answered. All risks, benefits and alternatives were discussed. These included but were not limited to bleeding, infection, and allergic reaction. Alcohol was used to clean the skin, and betadine was used to sterilize and prep the area in the anterior-medial aspect of the knee. Ethyl chloride spray was then used as a topical anesthetic. A 22-gauge needle was used to inject 2 cc of Euflexxa into the knee with ease. A sterile bandage was then applied. The patient tolerated the procedure well and there were no complications. \par \par Lot #: [id number]\par Exp: 05/2024\par \par The INJECTION # three of three Euflexxa injections was given today under sterile conditions into the right knee joint without complication (see procedure note). I again discussed the role of activity modification/icing following the injection to treat any local irritation from the injection.

## 2023-09-20 ENCOUNTER — RX RENEWAL (OUTPATIENT)
Age: 83
End: 2023-09-20

## 2023-09-22 ENCOUNTER — NON-APPOINTMENT (OUTPATIENT)
Age: 83
End: 2023-09-22

## 2023-10-17 ENCOUNTER — RESULT CHARGE (OUTPATIENT)
Age: 83
End: 2023-10-17

## 2023-10-18 ENCOUNTER — NON-APPOINTMENT (OUTPATIENT)
Age: 83
End: 2023-10-18

## 2023-10-18 ENCOUNTER — APPOINTMENT (OUTPATIENT)
Dept: CARDIOLOGY | Facility: CLINIC | Age: 83
End: 2023-10-18
Payer: MEDICARE

## 2023-10-18 ENCOUNTER — APPOINTMENT (OUTPATIENT)
Dept: PULMONOLOGY | Facility: CLINIC | Age: 83
End: 2023-10-18
Payer: MEDICARE

## 2023-10-18 VITALS
HEIGHT: 68 IN | BODY MASS INDEX: 22.73 KG/M2 | DIASTOLIC BLOOD PRESSURE: 78 MMHG | RESPIRATION RATE: 16 BRPM | OXYGEN SATURATION: 99 % | WEIGHT: 150 LBS | HEART RATE: 66 BPM | SYSTOLIC BLOOD PRESSURE: 128 MMHG | TEMPERATURE: 98.1 F

## 2023-10-18 VITALS
BODY MASS INDEX: 23.64 KG/M2 | TEMPERATURE: 97.1 F | SYSTOLIC BLOOD PRESSURE: 132 MMHG | WEIGHT: 156 LBS | DIASTOLIC BLOOD PRESSURE: 80 MMHG | RESPIRATION RATE: 17 BRPM | HEIGHT: 68 IN | HEART RATE: 64 BPM | OXYGEN SATURATION: 97 %

## 2023-10-18 PROCEDURE — 95012 NITRIC OXIDE EXP GAS DETER: CPT

## 2023-10-18 PROCEDURE — 99214 OFFICE O/P EST MOD 30 MIN: CPT

## 2023-10-18 PROCEDURE — 94010 BREATHING CAPACITY TEST: CPT

## 2023-10-18 PROCEDURE — 93000 ELECTROCARDIOGRAM COMPLETE: CPT

## 2023-10-18 PROCEDURE — 99214 OFFICE O/P EST MOD 30 MIN: CPT | Mod: 25

## 2023-10-23 ENCOUNTER — OFFICE (OUTPATIENT)
Dept: URBAN - METROPOLITAN AREA CLINIC 27 | Facility: CLINIC | Age: 83
Setting detail: OPHTHALMOLOGY
End: 2023-10-23
Payer: MEDICARE

## 2023-10-23 DIAGNOSIS — H53.10: ICD-10-CM

## 2023-10-23 DIAGNOSIS — H43.813: ICD-10-CM

## 2023-10-23 DIAGNOSIS — H26.491: ICD-10-CM

## 2023-10-23 DIAGNOSIS — H25.12: ICD-10-CM

## 2023-10-23 PROCEDURE — 99214 OFFICE O/P EST MOD 30 MIN: CPT | Performed by: OPHTHALMOLOGY

## 2023-10-23 ASSESSMENT — REFRACTION_AUTOREFRACTION
OD_CYLINDER: +0.50
OD_SPHERE: -2.75
OD_AXIS: 53
OS_SPHERE: -2.75
OS_CYLINDER: +0.50
OS_AXIS: 144

## 2023-10-23 ASSESSMENT — VISUAL ACUITY
OD_BCVA: 20/40-1
OS_BCVA: 20/25-2+1

## 2023-10-23 ASSESSMENT — REFRACTION_CURRENTRX
OD_AXIS: 45
OD_SPHERE: -2.75
OD_OVR_VA: 20/
OS_OVR_VA: 20/
OD_SPHERE: -3.25
OS_SPHERE: -3.00
OD_CYLINDER: +0.50
OS_AXIS: 3
OD_OVR_VA: 20/
OS_AXIS: 174
OS_SPHERE: -2.50
OS_OVR_VA: 20/
OS_CYLINDER: +0.75
OD_AXIS: 93
OD_CYLINDER: +0.25
OS_CYLINDER: +0.75

## 2023-10-23 ASSESSMENT — KERATOMETRY
OD_K1POWER_DIOPTERS: 44.00
METHOD_AUTO_MANUAL: AUTO
OD_AXISANGLE_DEGREES: 17
OS_K2POWER_DIOPTERS: 45.00
OS_K1POWER_DIOPTERS: 44.25
OS_AXISANGLE_DEGREES: 135
OD_K2POWER_DIOPTERS: 45.00

## 2023-10-23 ASSESSMENT — TONOMETRY
OD_IOP_MMHG: 12
OS_IOP_MMHG: 13
OD_IOP_MMHG: 15
OS_IOP_MMHG: 18

## 2023-10-23 ASSESSMENT — SPHEQUIV_DERIVED
OD_SPHEQUIV: -2.5
OS_SPHEQUIV: -2.5

## 2023-10-23 ASSESSMENT — CONFRONTATIONAL VISUAL FIELD TEST (CVF)
OD_FINDINGS: FULL
OS_FINDINGS: FULL

## 2023-10-23 ASSESSMENT — AXIALLENGTH_DERIVED
OD_AL: 24.2146
OS_AL: 24.1663

## 2023-10-24 ENCOUNTER — EMERGENCY (EMERGENCY)
Facility: HOSPITAL | Age: 83
LOS: 1 days | Discharge: HOME | End: 2023-10-24
Attending: STUDENT IN AN ORGANIZED HEALTH CARE EDUCATION/TRAINING PROGRAM
Payer: MEDICARE

## 2023-10-24 VITALS
DIASTOLIC BLOOD PRESSURE: 80 MMHG | HEART RATE: 63 BPM | SYSTOLIC BLOOD PRESSURE: 146 MMHG | RESPIRATION RATE: 16 BRPM | TEMPERATURE: 98 F | OXYGEN SATURATION: 98 %

## 2023-10-24 VITALS
HEIGHT: 68 IN | RESPIRATION RATE: 18 BRPM | DIASTOLIC BLOOD PRESSURE: 88 MMHG | SYSTOLIC BLOOD PRESSURE: 162 MMHG | OXYGEN SATURATION: 97 % | TEMPERATURE: 98 F | WEIGHT: 149.91 LBS | HEART RATE: 82 BPM

## 2023-10-24 DIAGNOSIS — Z98.890 OTHER SPECIFIED POSTPROCEDURAL STATES: Chronic | ICD-10-CM

## 2023-10-24 DIAGNOSIS — Z87.828 PERSONAL HISTORY OF OTHER (HEALED) PHYSICAL INJURY AND TRAUMA: Chronic | ICD-10-CM

## 2023-10-24 DIAGNOSIS — H26.9 UNSPECIFIED CATARACT: Chronic | ICD-10-CM

## 2023-10-24 LAB
ALBUMIN SERPL ELPH-MCNC: 4 G/DL — SIGNIFICANT CHANGE UP (ref 3.3–5)
ALBUMIN SERPL ELPH-MCNC: 4 G/DL — SIGNIFICANT CHANGE UP (ref 3.3–5)
ALP SERPL-CCNC: 78 U/L — SIGNIFICANT CHANGE UP (ref 40–120)
ALP SERPL-CCNC: 78 U/L — SIGNIFICANT CHANGE UP (ref 40–120)
ALT FLD-CCNC: 11 U/L — SIGNIFICANT CHANGE UP (ref 10–45)
ALT FLD-CCNC: 11 U/L — SIGNIFICANT CHANGE UP (ref 10–45)
ANION GAP SERPL CALC-SCNC: 11 MMOL/L — SIGNIFICANT CHANGE UP (ref 5–17)
ANION GAP SERPL CALC-SCNC: 11 MMOL/L — SIGNIFICANT CHANGE UP (ref 5–17)
AST SERPL-CCNC: 11 U/L — SIGNIFICANT CHANGE UP (ref 10–40)
AST SERPL-CCNC: 11 U/L — SIGNIFICANT CHANGE UP (ref 10–40)
BASOPHILS # BLD AUTO: 0.13 K/UL — SIGNIFICANT CHANGE UP (ref 0–0.2)
BASOPHILS # BLD AUTO: 0.13 K/UL — SIGNIFICANT CHANGE UP (ref 0–0.2)
BASOPHILS NFR BLD AUTO: 2 % — SIGNIFICANT CHANGE UP (ref 0–2)
BASOPHILS NFR BLD AUTO: 2 % — SIGNIFICANT CHANGE UP (ref 0–2)
BILIRUB SERPL-MCNC: 0.3 MG/DL — SIGNIFICANT CHANGE UP (ref 0.2–1.2)
BILIRUB SERPL-MCNC: 0.3 MG/DL — SIGNIFICANT CHANGE UP (ref 0.2–1.2)
BUN SERPL-MCNC: 13 MG/DL — SIGNIFICANT CHANGE UP (ref 7–23)
BUN SERPL-MCNC: 13 MG/DL — SIGNIFICANT CHANGE UP (ref 7–23)
CALCIUM SERPL-MCNC: 9.6 MG/DL — SIGNIFICANT CHANGE UP (ref 8.4–10.5)
CALCIUM SERPL-MCNC: 9.6 MG/DL — SIGNIFICANT CHANGE UP (ref 8.4–10.5)
CHLORIDE SERPL-SCNC: 106 MMOL/L — SIGNIFICANT CHANGE UP (ref 96–108)
CHLORIDE SERPL-SCNC: 106 MMOL/L — SIGNIFICANT CHANGE UP (ref 96–108)
CO2 SERPL-SCNC: 25 MMOL/L — SIGNIFICANT CHANGE UP (ref 22–31)
CO2 SERPL-SCNC: 25 MMOL/L — SIGNIFICANT CHANGE UP (ref 22–31)
CREAT SERPL-MCNC: 1.08 MG/DL — SIGNIFICANT CHANGE UP (ref 0.5–1.3)
CREAT SERPL-MCNC: 1.08 MG/DL — SIGNIFICANT CHANGE UP (ref 0.5–1.3)
EGFR: 69 ML/MIN/1.73M2 — SIGNIFICANT CHANGE UP
EGFR: 69 ML/MIN/1.73M2 — SIGNIFICANT CHANGE UP
EOSINOPHIL # BLD AUTO: 0.32 K/UL — SIGNIFICANT CHANGE UP (ref 0–0.5)
EOSINOPHIL # BLD AUTO: 0.32 K/UL — SIGNIFICANT CHANGE UP (ref 0–0.5)
EOSINOPHIL NFR BLD AUTO: 5 % — SIGNIFICANT CHANGE UP (ref 0–6)
EOSINOPHIL NFR BLD AUTO: 5 % — SIGNIFICANT CHANGE UP (ref 0–6)
GLUCOSE SERPL-MCNC: 94 MG/DL — SIGNIFICANT CHANGE UP (ref 70–99)
GLUCOSE SERPL-MCNC: 94 MG/DL — SIGNIFICANT CHANGE UP (ref 70–99)
HCT VFR BLD CALC: 41.2 % — SIGNIFICANT CHANGE UP (ref 39–50)
HCT VFR BLD CALC: 41.2 % — SIGNIFICANT CHANGE UP (ref 39–50)
HGB BLD-MCNC: 13.3 G/DL — SIGNIFICANT CHANGE UP (ref 13–17)
HGB BLD-MCNC: 13.3 G/DL — SIGNIFICANT CHANGE UP (ref 13–17)
IMM GRANULOCYTES NFR BLD AUTO: 0.3 % — SIGNIFICANT CHANGE UP (ref 0–0.9)
IMM GRANULOCYTES NFR BLD AUTO: 0.3 % — SIGNIFICANT CHANGE UP (ref 0–0.9)
LYMPHOCYTES # BLD AUTO: 1.37 K/UL — SIGNIFICANT CHANGE UP (ref 1–3.3)
LYMPHOCYTES # BLD AUTO: 1.37 K/UL — SIGNIFICANT CHANGE UP (ref 1–3.3)
LYMPHOCYTES # BLD AUTO: 21.6 % — SIGNIFICANT CHANGE UP (ref 13–44)
LYMPHOCYTES # BLD AUTO: 21.6 % — SIGNIFICANT CHANGE UP (ref 13–44)
MCHC RBC-ENTMCNC: 28.2 PG — SIGNIFICANT CHANGE UP (ref 27–34)
MCHC RBC-ENTMCNC: 28.2 PG — SIGNIFICANT CHANGE UP (ref 27–34)
MCHC RBC-ENTMCNC: 32.3 GM/DL — SIGNIFICANT CHANGE UP (ref 32–36)
MCHC RBC-ENTMCNC: 32.3 GM/DL — SIGNIFICANT CHANGE UP (ref 32–36)
MCV RBC AUTO: 87.3 FL — SIGNIFICANT CHANGE UP (ref 80–100)
MCV RBC AUTO: 87.3 FL — SIGNIFICANT CHANGE UP (ref 80–100)
MONOCYTES # BLD AUTO: 0.67 K/UL — SIGNIFICANT CHANGE UP (ref 0–0.9)
MONOCYTES # BLD AUTO: 0.67 K/UL — SIGNIFICANT CHANGE UP (ref 0–0.9)
MONOCYTES NFR BLD AUTO: 10.6 % — SIGNIFICANT CHANGE UP (ref 2–14)
MONOCYTES NFR BLD AUTO: 10.6 % — SIGNIFICANT CHANGE UP (ref 2–14)
NEUTROPHILS # BLD AUTO: 3.84 K/UL — SIGNIFICANT CHANGE UP (ref 1.8–7.4)
NEUTROPHILS # BLD AUTO: 3.84 K/UL — SIGNIFICANT CHANGE UP (ref 1.8–7.4)
NEUTROPHILS NFR BLD AUTO: 60.5 % — SIGNIFICANT CHANGE UP (ref 43–77)
NEUTROPHILS NFR BLD AUTO: 60.5 % — SIGNIFICANT CHANGE UP (ref 43–77)
NRBC # BLD: 0 /100 WBCS — SIGNIFICANT CHANGE UP (ref 0–0)
NRBC # BLD: 0 /100 WBCS — SIGNIFICANT CHANGE UP (ref 0–0)
PLATELET # BLD AUTO: 211 K/UL — SIGNIFICANT CHANGE UP (ref 150–400)
PLATELET # BLD AUTO: 211 K/UL — SIGNIFICANT CHANGE UP (ref 150–400)
POTASSIUM SERPL-MCNC: 3.8 MMOL/L — SIGNIFICANT CHANGE UP (ref 3.5–5.3)
POTASSIUM SERPL-MCNC: 3.8 MMOL/L — SIGNIFICANT CHANGE UP (ref 3.5–5.3)
POTASSIUM SERPL-SCNC: 3.8 MMOL/L — SIGNIFICANT CHANGE UP (ref 3.5–5.3)
POTASSIUM SERPL-SCNC: 3.8 MMOL/L — SIGNIFICANT CHANGE UP (ref 3.5–5.3)
PROT SERPL-MCNC: 6.5 G/DL — SIGNIFICANT CHANGE UP (ref 6–8.3)
PROT SERPL-MCNC: 6.5 G/DL — SIGNIFICANT CHANGE UP (ref 6–8.3)
RBC # BLD: 4.72 M/UL — SIGNIFICANT CHANGE UP (ref 4.2–5.8)
RBC # BLD: 4.72 M/UL — SIGNIFICANT CHANGE UP (ref 4.2–5.8)
RBC # FLD: 15 % — HIGH (ref 10.3–14.5)
RBC # FLD: 15 % — HIGH (ref 10.3–14.5)
SODIUM SERPL-SCNC: 142 MMOL/L — SIGNIFICANT CHANGE UP (ref 135–145)
SODIUM SERPL-SCNC: 142 MMOL/L — SIGNIFICANT CHANGE UP (ref 135–145)
T3 SERPL-MCNC: 107 NG/DL — SIGNIFICANT CHANGE UP (ref 80–200)
T3 SERPL-MCNC: 107 NG/DL — SIGNIFICANT CHANGE UP (ref 80–200)
T4 AB SER-ACNC: 7.1 UG/DL — SIGNIFICANT CHANGE UP (ref 4.6–12)
T4 AB SER-ACNC: 7.1 UG/DL — SIGNIFICANT CHANGE UP (ref 4.6–12)
TSH SERPL-MCNC: 1.2 UIU/ML — SIGNIFICANT CHANGE UP (ref 0.27–4.2)
TSH SERPL-MCNC: 1.2 UIU/ML — SIGNIFICANT CHANGE UP (ref 0.27–4.2)
WBC # BLD: 6.35 K/UL — SIGNIFICANT CHANGE UP (ref 3.8–10.5)
WBC # BLD: 6.35 K/UL — SIGNIFICANT CHANGE UP (ref 3.8–10.5)
WBC # FLD AUTO: 6.35 K/UL — SIGNIFICANT CHANGE UP (ref 3.8–10.5)
WBC # FLD AUTO: 6.35 K/UL — SIGNIFICANT CHANGE UP (ref 3.8–10.5)

## 2023-10-24 PROCEDURE — 93005 ELECTROCARDIOGRAM TRACING: CPT

## 2023-10-24 PROCEDURE — 80053 COMPREHEN METABOLIC PANEL: CPT

## 2023-10-24 PROCEDURE — 84480 ASSAY TRIIODOTHYRONINE (T3): CPT

## 2023-10-24 PROCEDURE — 85025 COMPLETE CBC W/AUTO DIFF WBC: CPT

## 2023-10-24 PROCEDURE — 84436 ASSAY OF TOTAL THYROXINE: CPT

## 2023-10-24 PROCEDURE — 84443 ASSAY THYROID STIM HORMONE: CPT

## 2023-10-24 PROCEDURE — 99284 EMERGENCY DEPT VISIT MOD MDM: CPT | Mod: GC

## 2023-10-24 PROCEDURE — 99284 EMERGENCY DEPT VISIT MOD MDM: CPT | Mod: 25

## 2023-10-24 NOTE — ED PROVIDER NOTE - OBJECTIVE STATEMENT
84yo man with PMH HTN, hypothyroidism, presenting due to what he states is gurgling/paresthesias of his abdomen that intermittently radiates to his extremities x3 weeks.  Today was sleeping when the symptoms occurred at around 10 PM, was associated for the first time with chills and grew concerned.  Patient denies fevers, recent sick contacts, chest pain, SOB, nausea, vomiting, diarrhea, dysuria, urinary retention or incontinence, bowel changes, weakness of his extremities.  States that he has a stress and echo scheduled with his cardiologist Dr. Yao for this Thursday.  Does not have the paresthesias at this moment. No recent changes to his medications.

## 2023-10-24 NOTE — ED PROVIDER NOTE - PHYSICAL EXAMINATION
Gen: NAD, AOx3, able to make needs known, non-toxic  Head: NCAT  HEENT: EOMI, normal conjunctiva  Lung: CTAB, no respiratory distress, no wheezes/rhonchi/rales B/L, speaking in full sentences  CV: RRR, pulses bilaterally   Abd: soft, NTND, no guarding, no CVA tenderness  MSK: no visible bony deformities  Neuro: No focal sensory or motor deficits  Skin: Warm, well perfused, no rash  Psych: normal affect

## 2023-10-24 NOTE — ED ADULT NURSE NOTE - OBJECTIVE STATEMENT
pt is an 84yo male PMH HTN, thyroid disease presenting to the ED complaining of tingling. pt reports onset of diaphoresis, various areas of the body "tingling" around 0100 tonight, coming to ED for evaluation. pt reports tingling originates periumbilically, radiates up into the midsternal region and neck, down into the b/l thighs, intermittent radiation from the neck into b/l shoulders, and b/l upper extremities. pt denies any precipitating or alleviating factors. pt A&Ox3 gross neuro intact, no difficulty speaking in complete sentences, pulses x 4, samuels x4, abdomen soft nontender nondistended, skin intact. pt denies chest pain, sob, ha, n/v/d, abdominal pain, f/c, urinary symptoms, hematuria

## 2023-10-24 NOTE — ED PROVIDER NOTE - NSFOLLOWUPINSTRUCTIONS_ED_ALL_ED_FT
You were seen in the Emergency Room today because of paresthesias of your body. A copy of your results is included in your discharge paperwork.     Please follow-up with your Primary Care Physician within the week.     WHAT YOU NEED TO KNOW:  Paresthesia is numbness, tingling, or burning. It can happen in any part of your body, but usually occurs in your legs, feet, arms, or hands.    DISCHARGE INSTRUCTIONS:    Return to the emergency department if:   •You have severe pain along with numbness and tingling.  •Your legs suddenly become cold. You have trouble moving your legs, and they ache.  •You have increased weakness in a part of your body.  •You have uncontrolled movements.    Contact your healthcare provider or neurologist if:   •Your symptoms do not improve.  •You have symptoms in more than one part of your body.  •You have questions or concerns about your condition or care.

## 2023-10-24 NOTE — ED PROVIDER NOTE - PATIENT PORTAL LINK FT
You can access the FollowMyHealth Patient Portal offered by [facility] by registering at the following website: [email or web address]. By joining [facility]’s FollowMyHealth portal, you will also be able to view your health information using other applications (apps) compatible with our system.

## 2023-10-24 NOTE — ED PROVIDER NOTE - CLINICAL SUMMARY MEDICAL DECISION MAKING FREE TEXT BOX
Thomas, PGY3 - 83-year-old man with HTN, hypothyroidism, presenting due to intermittent gurgling/paresthesias of the abdomen that sometimes radiates to his arms or legs.  Sometimes it is 1 arm, sometimes its both legs, symptoms do not appear to have a specific range or pattern. does not have any systemic symptoms other than chills that happened for the first time today.  Patient at this time states that he is at his baseline health, in no distress, no chest pain or SOB.  Physical exam nonactionable.  Unclear etiology for paresthesias and sensory complaints, consider electrolyte imbalance.  If work-up nonactionable will discharge home with follow-up with cardiologist on Thursday for further work-up. No recent changes to thyroid medications, does not have classic hypothyroid or hyperthyroid symptoms. *The above represents an initial assessment/impression. Please refer to progress notes for potential changes in patient clinical course*

## 2023-10-24 NOTE — ED PROVIDER NOTE - CHILD ABUSE FACILITY
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 The pt provides that she started having sharp left sided chest and shoulder and back pain, on exacerbation. No h/o sob, abd pain, nvd, headache, fever, chills, dizziness, dysuria or urinary incontinence. Furhter evaluation in Main ED
[facility]

## 2023-10-24 NOTE — ED PROVIDER NOTE - PROGRESS NOTE DETAILS
Thomas, PGY3 - labs nonactionable. thyroid labs pending. Patient stable for discharge. Understands the Emergency Room work-up and discharge precautions. Will follow-up with pcp

## 2023-10-24 NOTE — ED PROVIDER NOTE - ATTENDING CONTRIBUTION TO CARE
I have personally performed a face to face medical and diagnostic evaluation of the patient. I have discussed with and reviewed the Resident's note and agree with the History, ROS, Physical Exam and MDM unless otherwise indicated. A brief summary of my personal evaluation and impression can be found below.    83-year-old man with HTN, hypothyroidism, presenting due to intermittent gurgling/paresthesias of the abdomen that sometimes radiates to his arms or legs.  Currently symptom-free.   on exam, vital signs stable with no focal findings. Unclear etiology for paresthesias and sensory complaints, consider electrolyte imbalance   Versus thyroid disorder versus neuropathy.  If work-up nonactionable will discharge home with follow-up with cardiologist on Thursday for further work-up.  reassess to dispo. Christin Rodriguez, JOY Attending

## 2023-10-24 NOTE — ED ADULT NURSE NOTE - NSFALLUNIVINTERV_ED_ALL_ED
Bed/Stretcher in lowest position, wheels locked, appropriate side rails in place/Call bell, personal items and telephone in reach/Instruct patient to call for assistance before getting out of bed/chair/stretcher/Non-slip footwear applied when patient is off stretcher/Helper to call system/Physically safe environment - no spills, clutter or unnecessary equipment/Purposeful proactive rounding/Room/bathroom lighting operational, light cord in reach

## 2023-10-24 NOTE — ED ADULT NURSE NOTE - NS ED PATIENT SAFETY CONCERN
[address]: I briefly re viewed chart is this is the first time she had s/e or has she had them prior? Has she responded to Botox in the past with good benefit?     I can see her, would prefer office visit scheduled first and if patient agreeable Botox with lower dosing in hopes of reducing s e would be the plan    Graciela Horn to help with schedule No

## 2023-10-26 ENCOUNTER — APPOINTMENT (OUTPATIENT)
Dept: CARDIOLOGY | Facility: CLINIC | Age: 83
End: 2023-10-26
Payer: MEDICARE

## 2023-10-26 DIAGNOSIS — R00.2 PALPITATIONS: ICD-10-CM

## 2023-10-26 DIAGNOSIS — I34.1 NONRHEUMATIC MITRAL (VALVE) PROLAPSE: ICD-10-CM

## 2023-10-26 PROCEDURE — 93880 EXTRACRANIAL BILAT STUDY: CPT

## 2023-10-26 PROCEDURE — 93306 TTE W/DOPPLER COMPLETE: CPT

## 2023-11-01 ENCOUNTER — APPOINTMENT (OUTPATIENT)
Dept: VASCULAR SURGERY | Facility: CLINIC | Age: 83
End: 2023-11-01
Payer: MEDICARE

## 2023-11-01 VITALS
HEIGHT: 68 IN | TEMPERATURE: 98 F | HEART RATE: 88 BPM | BODY MASS INDEX: 22.73 KG/M2 | WEIGHT: 150 LBS | SYSTOLIC BLOOD PRESSURE: 146 MMHG | DIASTOLIC BLOOD PRESSURE: 94 MMHG

## 2023-11-01 DIAGNOSIS — R10.13 EPIGASTRIC PAIN: ICD-10-CM

## 2023-11-01 PROCEDURE — 99203 OFFICE O/P NEW LOW 30 MIN: CPT

## 2023-11-08 ENCOUNTER — APPOINTMENT (OUTPATIENT)
Dept: VASCULAR SURGERY | Facility: CLINIC | Age: 83
End: 2023-11-08

## 2023-11-08 PROBLEM — R10.13 ABDOMINAL DISCOMFORT, EPIGASTRIC: Status: ACTIVE | Noted: 2023-11-08

## 2023-11-17 ENCOUNTER — APPOINTMENT (OUTPATIENT)
Dept: ORTHOPEDIC SURGERY | Facility: CLINIC | Age: 83
End: 2023-11-17
Payer: MEDICARE

## 2023-11-17 VITALS
HEIGHT: 68 IN | OXYGEN SATURATION: 97 % | WEIGHT: 150 LBS | HEART RATE: 82 BPM | SYSTOLIC BLOOD PRESSURE: 145 MMHG | BODY MASS INDEX: 22.73 KG/M2 | DIASTOLIC BLOOD PRESSURE: 89 MMHG

## 2023-11-17 DIAGNOSIS — M79.605 PAIN IN RIGHT LEG: ICD-10-CM

## 2023-11-17 DIAGNOSIS — M79.604 PAIN IN RIGHT LEG: ICD-10-CM

## 2023-11-17 PROCEDURE — 99214 OFFICE O/P EST MOD 30 MIN: CPT

## 2023-11-21 ENCOUNTER — APPOINTMENT (OUTPATIENT)
Dept: VASCULAR SURGERY | Facility: CLINIC | Age: 83
End: 2023-11-21
Payer: MEDICARE

## 2023-11-21 VITALS
TEMPERATURE: 97.8 F | HEIGHT: 68 IN | SYSTOLIC BLOOD PRESSURE: 154 MMHG | WEIGHT: 155 LBS | BODY MASS INDEX: 23.49 KG/M2 | DIASTOLIC BLOOD PRESSURE: 92 MMHG | HEART RATE: 80 BPM

## 2023-11-21 DIAGNOSIS — M62.838 OTHER MUSCLE SPASM: ICD-10-CM

## 2023-11-21 PROCEDURE — 99213 OFFICE O/P EST LOW 20 MIN: CPT

## 2023-11-25 PROBLEM — R00.2 PALPITATIONS: Status: ACTIVE | Noted: 2019-01-28

## 2023-11-25 PROBLEM — I34.1 MITRAL VALVE PROLAPSE: Status: ACTIVE | Noted: 2020-09-02

## 2023-12-14 PROBLEM — M62.838 MUSCLE SPASMS OF BOTH LOWER EXTREMITIES: Status: ACTIVE | Noted: 2023-12-14

## 2023-12-14 NOTE — PHYSICAL EXAM
[Respiratory Effort] : normal respiratory effort [Normal Rate and Rhythm] : normal rate and rhythm [2+] : left 2+ [1+] : left 1+ [Alert] : alert [Oriented to Person] : oriented to person [Oriented to Place] : oriented to place [Oriented to Time] : oriented to time [Calm] : calm [Abdomen Tenderness] : ~T ~M No abdominal tenderness [Skin Ulcer] : no ulcer [de-identified] : appears stated age [de-identified] : normocephalic, atraumatic [de-identified] : supple

## 2023-12-14 NOTE — ASSESSMENT
[FreeTextEntry1] : Problem #1 bilateral lower extremity muscle spasms  unlikely to be due to vascular etiology likely dehydration vs electrolyte abnormality vs musculoskeletal issues recommend further evaluation by PCP follow up as needed

## 2023-12-14 NOTE — HISTORY OF PRESENT ILLNESS
[FreeTextEntry1] : 83-year-old man with history of hypertension, arthritis, dyspnea on exertion, claudication, lumbar radiculopathy secondary to lumbosacral stenosis, obstructive sleep apnea, pulmonary artery hypertension, peripheral vascular disease, reactive airway disease, presents for abnormal sensation in epigastrium causing intermittent discomfort. Patient states it is difficult to describe what he is feeling but that it improved with respiratory medications. He has history of chronic back pain. He denies abdominal pain, fear of eating, inability to tolerate diet, nausea, vomiting, constipation, or diarrhea. He denies family history of aneurysm. He denies rest pain. [de-identified] : Presents with new complaint of pain secondary to spasms in both legs. Denies history of swelling, claudication, or rest pain.

## 2023-12-20 ENCOUNTER — APPOINTMENT (OUTPATIENT)
Dept: INTERNAL MEDICINE | Facility: CLINIC | Age: 83
End: 2023-12-20

## 2024-02-23 ENCOUNTER — APPOINTMENT (OUTPATIENT)
Dept: PULMONOLOGY | Facility: CLINIC | Age: 84
End: 2024-02-23
Payer: MEDICARE

## 2024-02-23 ENCOUNTER — APPOINTMENT (OUTPATIENT)
Dept: CARDIOLOGY | Facility: CLINIC | Age: 84
End: 2024-02-23
Payer: MEDICARE

## 2024-02-23 VITALS
RESPIRATION RATE: 16 BRPM | OXYGEN SATURATION: 97 % | SYSTOLIC BLOOD PRESSURE: 142 MMHG | HEIGHT: 68 IN | HEART RATE: 72 BPM | WEIGHT: 155 LBS | DIASTOLIC BLOOD PRESSURE: 80 MMHG | BODY MASS INDEX: 23.49 KG/M2 | TEMPERATURE: 97.3 F

## 2024-02-23 VITALS
TEMPERATURE: 96.3 F | WEIGHT: 155 LBS | HEIGHT: 68 IN | BODY MASS INDEX: 23.49 KG/M2 | OXYGEN SATURATION: 98 % | HEART RATE: 72 BPM | SYSTOLIC BLOOD PRESSURE: 144 MMHG | RESPIRATION RATE: 16 BRPM | DIASTOLIC BLOOD PRESSURE: 86 MMHG

## 2024-02-23 DIAGNOSIS — R06.02 SHORTNESS OF BREATH: ICD-10-CM

## 2024-02-23 DIAGNOSIS — J45.909 UNSPECIFIED ASTHMA, UNCOMPLICATED: ICD-10-CM

## 2024-02-23 DIAGNOSIS — K21.9 GASTRO-ESOPHAGEAL REFLUX DISEASE W/OUT ESOPHAGITIS: ICD-10-CM

## 2024-02-23 DIAGNOSIS — I65.29 OCCLUSION AND STENOSIS OF UNSPECIFIED CAROTID ARTERY: ICD-10-CM

## 2024-02-23 DIAGNOSIS — R09.89 OTHER SPECIFIED SYMPTOMS AND SIGNS INVOLVING THE CIRCULATORY AND RESPIRATORY SYSTEMS: ICD-10-CM

## 2024-02-23 DIAGNOSIS — I27.21 SECONDARY PULMONARY ARTERIAL HYPERTENSION: ICD-10-CM

## 2024-02-23 DIAGNOSIS — I07.1 RHEUMATIC TRICUSPID INSUFFICIENCY: ICD-10-CM

## 2024-02-23 DIAGNOSIS — J45.20 MILD INTERMITTENT ASTHMA, UNCOMPLICATED: ICD-10-CM

## 2024-02-23 DIAGNOSIS — E78.00 PURE HYPERCHOLESTEROLEMIA, UNSPECIFIED: ICD-10-CM

## 2024-02-23 DIAGNOSIS — I34.0 NONRHEUMATIC MITRAL (VALVE) INSUFFICIENCY: ICD-10-CM

## 2024-02-23 DIAGNOSIS — R09.82 POSTNASAL DRIP: ICD-10-CM

## 2024-02-23 DIAGNOSIS — I10 ESSENTIAL (PRIMARY) HYPERTENSION: ICD-10-CM

## 2024-02-23 DIAGNOSIS — G47.33 OBSTRUCTIVE SLEEP APNEA (ADULT) (PEDIATRIC): ICD-10-CM

## 2024-02-23 DIAGNOSIS — I37.1 NONRHEUMATIC PULMONARY VALVE INSUFFICIENCY: ICD-10-CM

## 2024-02-23 PROCEDURE — 94010 BREATHING CAPACITY TEST: CPT

## 2024-02-23 PROCEDURE — 99214 OFFICE O/P EST MOD 30 MIN: CPT | Mod: 25

## 2024-02-23 PROCEDURE — 94729 DIFFUSING CAPACITY: CPT

## 2024-02-23 PROCEDURE — 99214 OFFICE O/P EST MOD 30 MIN: CPT

## 2024-02-23 PROCEDURE — 95012 NITRIC OXIDE EXP GAS DETER: CPT

## 2024-02-23 PROCEDURE — ZZZZZ: CPT

## 2024-02-23 PROCEDURE — 94727 GAS DIL/WSHOT DETER LNG VOL: CPT

## 2024-02-23 PROCEDURE — G2211 COMPLEX E/M VISIT ADD ON: CPT

## 2024-02-23 NOTE — PHYSICAL EXAM
[Well Developed] : well developed [Well Nourished] : well nourished [No Acute Distress] : no acute distress [Normal Venous Pressure] : normal venous pressure [No Carotid Bruit] : no carotid bruit [Normal S1, S2] : normal S1, S2 [No Rub] : no rub [No Gallop] : no gallop heard [II] : a grade 2 [No Abnormalities] : the abdominal aorta was not enlarged and no bruit was heard [Clear Lung Fields] : clear lung fields [Good Air Entry] : good air entry [No Respiratory Distress] : no respiratory distress  [Soft] : abdomen soft [Non Tender] : non-tender [No Masses/organomegaly] : no masses/organomegaly [Normal Bowel Sounds] : normal bowel sounds [Normal Gait] : normal gait [No Cyanosis] : no cyanosis [No Edema] : no edema [No Varicosities] : no varicosities [No Clubbing] : no clubbing [No Rash] : no rash [No Skin Lesions] : no skin lesions [No Focal Deficits] : no focal deficits [Moves all extremities] : moves all extremities [Normal Speech] : normal speech [Alert and Oriented] : alert and oriented [Normal memory] : normal memory [General Appearance - Well Developed] : well developed [Normal Appearance] : normal appearance [Well Groomed] : well groomed [General Appearance - Well Nourished] : well nourished [General Appearance - In No Acute Distress] : no acute distress [No Deformities] : no deformities [Normal Conjunctiva] : the conjunctiva exhibited no abnormalities [Normal Oral Mucosa] : normal oral mucosa [Normal Jugular Venous A Waves Present] : normal jugular venous A waves present [Normal Oropharynx] : normal oropharynx [Normal Jugular Venous V Waves Present] : normal jugular venous V waves present [No Jugular Venous Merino A Waves] : no jugular venous merino A waves [Respiration, Rhythm And Depth] : normal respiratory rhythm and effort [Auscultation Breath Sounds / Voice Sounds] : lungs were clear to auscultation bilaterally [Exaggerated Use Of Accessory Muscles For Inspiration] : no accessory muscle use [Bowel Sounds] : normal bowel sounds [Abdomen Soft] : soft [Abnormal Walk] : normal gait [Nail Clubbing] : no clubbing of the fingernails [Cyanosis, Localized] : no localized cyanosis [Skin Color & Pigmentation] : normal skin color and pigmentation [Skin Turgor] : normal skin turgor [Oriented To Time, Place, And Person] : oriented to person, place, and time [] : no rash [Impaired Insight] : insight and judgment were intact [Affect] : the affect was normal [Mood] : the mood was normal [5th Left ICS - MCL] : palpated at the 5th LICS in the midclavicular line [Normal] : normal [Normal Rate] : normal [No Precordial Heave] : no precordial heave was noted [Rhythm Regular] : regular [Normal S2] : normal S2 [Normal S1] : normal S1 [I] : a grade 1 [2+] : left 2+ [No Pitting Edema] : no pitting edema present

## 2024-02-23 NOTE — ASSESSMENT
[FreeTextEntry1] : Mr. Davenport is an 83 y.o M with a history of RADS, allergies, GERD and OSAS, BPH, (+) IgE, Asthma, mild PAH on echo 2021- improved LPR Sx, s/p URI / Bronchospasms 10/2023- nondescript "spasms" of abdomen  problem 1: RADs/mild intermittent asthma (Active) -add Symbicort 160 2 inhalations BID -Asthma is believed to be caused by inherited (genetic) and environmental factor, but its exact cause is unknown. Asthma may be triggered by allergens, lung infections, or irritants in the air. Asthma triggers are different for each person -Inhaler technique reviewed as well as oral hygiene techniques reviewed with patient. Avoidance of cold air, extremes of temperature, rescue inhaler should be used before exercise. Order of medication reviewed with patient. Recommended use of a cool mist humidifier in the bedroom.  Problem 1A: Elevated IgE - candidate for Xolair -Xolair is a recombinant DNA- derived humanized IgG1K monoclonal antibody that selectively binds ot human immunoglobulin E (IgE). Xolair is produced by a Chinese hamster ovary cell suspension culture in nutrient medium containing the antibiotic gentamicin. Gentamicin is not detectable in the final product. Xolair is a sterile, white, preservative free, lyophilized powder contained in a single use vial that is reconstituted with sterile water for suspension. Side effects include: wheezing, tightness of the chest, trouble breathing, hives, skin rash, feeling anxious or light-headed, fainting, warmth or tingling under skin, or swelling of face, lips, or tongue  Problem 1B Cardio (PAH) mild (PASP 42 mm Hg) -complete yearly ECHO next 10/2024 Pulmonary hypertension is a disorder of the pulmonary arteries. It is important to distinguish the difference between pulmonary arterial hypertension which is idiopathic versus secondary pulmonary hypertension which is related to heart disease being diastolic dysfunction or congestive heart failure or other forms of pulmonary hypertension. Diagnostics include an initial echocardiogram evaluating the pulmonary artery pressures. If this is abnormal, to proceed with a VQ scan as well as a CTPA, and an eventual right heart catheterization to absolutely confirm the echocardiogram findings. (No medication can be prescribed until the right heart catheterization). If present, the evaluation will include rheumatological blood testing, HIV testing, and potential evaluation for cirrhosis. Drug classes include: PED5 (Revatio, Adcirca); ETRA (Tracleer, Macitentan, Letairis); Soluble guanylate cyclase (Adempas); Prostacyclins (Uptravi, Tyavso, Ventavis, Remodulin, or Orenitram derivatives).  problem 2: allergic rhinitis -continue to use Qnasl or Flonase 1 sniff each nostril BID -continue Ayr saline -continue to use Claritin OTC -s/p blood work to include: IgE level (+), eosinophil level (-), vitamin D level (-), food IgE level (+), and asthma profile (+) -Environmental measures for allergies were encouraged including mattress and pillow cover, air purifier, and environmental controls.  problem 3: GERD- controlled -continue BIO K -off Prilosec 20 mg QOD -EgD/pH monitor - continue Pepcid AC 40 mg QHS  -Add DGL pre-meal qHS -Recommend Alkalol Gargle PRN -continue to follow up with Dr. Huffman -Things to avoid including overeating, spicy foods, tight clothing, eating within three hours of bed, this list is not all inclusive. -For treatment of reflux, possible options discussed including diet control, H2 blockers, PPIs, as well as coating motility agents discussed as treatment options. Timing of meals and proximity of last meal to sleep were discussed. If symptoms persist, a formal gastrointestinal evaluation is needed.  problem 4: OSAS (No longer OSAS) -continue use of breath rite strips -recommended Somnifix -s/p home sleep study - Not Present -off CPAP -he is being recommended to continue use of the CPAP machine and has been compliant and tolerating it well. -he is recommended to use Oxy-Aid by Respitec in the interim -Discussed the risks/associations with coronary artery disease, atrial fibrillation, arrhythmia, memory loss, issues with concentration, stroke risk, hypertension, nocturia, chronic reflux/Terrazas's esophagus some but not all inclusive. Treatment options discussed including CPAP/BiPAP machine, oral appliance, ProVent therapy, Oxy-Aid by Respitec, new technologies, or positional sleep. -Sleep apnea is associated with adverse clinical consequences which can affect most organ systems. Cardiovascular disease risk includes arrhythmias, atrial fibrillation, hypertension, coronary artery disease, and stroke. Metabolic disorders include diabetes type 2, non-alcoholic fatty liver disease. Mood disorder especially depression; and cognitive decline especially in the elderly. Associations with chronic reflux/Terrazas's esophagus some but not all inclusive. -Reasons include arousal consistent with hypopnea; respiratory events most prominent in REM sleep or supine position; therefore sleep staging and body position are important for accurate diagnosis and estimation of AHI.  problem 5: fatigue and use of supplements -continue baby aspirin QD -continue Co-Q 10 200 mg QD -continue L- Carnitine 500 mg BID  Problem 5A: Nondescript muscle Sx -s/p Haimovic/Najma/Silpe -recommended Neuro-Mag  -recommended Berberine OTC supplement   problem 6: dysphonia -recommended to take Dolce Voce  problem 7: tinnitus -recommended to try Quietus -recommended Breath-right strips QHS  Problem 8: Health Maintenance/COVID19 Precautions: -Recommended Supplements: -SPM -Tumeric -Topricin - s/p covid 19 vaccine (Adalid & Adalid) (Refused Booster) -Covid 19 precaution, vaccine and booster discussed at length and recommended - Clean your hands often. Wash your hands often with soap and water for at least 20 seconds, especially after blowing your nose, coughing, or sneezing, or having been in a public place. - If soap and water are not available, use a hand  that contains at least 60% alcohol. - To the extent possible, avoid touching high-touch surfaces in public places - elevator buttons, door handles, handrails, handshaking with people, etc. Use a tissue or your sleeve to cover your hand or finger if you must touch something. - Wash your hands after touching surfaces in public places.  Immune Support Recommendations: -OTC Vitamin C 500mg BID -OTC Quercetin 250-500mg BID -OTC Zinc 75-100mg per day -OTC Melatonin 1or 2mg a night -OTC Vitamin D 1-4000mg per day -OTC Tonic Water 8oz per day  problem 9: health maintenance -recommended [facility] [facility] Stretches on YouTube - rash / itch - Allergic profile - s/p blood work to include: IgE level, eosinophil level, vitamin D level, food IgE level, and asthma profile ([place]) - Recommended to see Dermatologist - Dr. Mitch Boxer (past) -recommended Topricin cream for Derm -refused 2018 flu shot - recommended to get flu shot after October 15 (2023) -recommended strep pneumonia vaccines: Prevnar-13 vaccine (up to date), followed by Pneumo vaccine 23 one year following (completed) -recommended early intervention for URIs -recommended regular osteoporosis evaluations -recommended early dermatological evaluations -recommended after the age of 50 to the age of 70, colonoscopy every 5 years  F/P in 4 months He is encouraged to call with any changes, concerns, or questions.

## 2024-02-23 NOTE — HISTORY OF PRESENT ILLNESS
[FreeTextEntry1] : Mr. Davenport is an 83 year old male presenting to the office for a follow-up pulmonary evaluation for LPRD, hoarseness, SOHAIL, postnasal drip and RADS. His chief complaint is   -he notes he has RUQ agitation at night when he sleeps, which began after his most recent bout of bronchitis. He has a RUQ pulsation that radiates up his chest into his chin and wakes him up. Lifting weights brings it on. When he doesn't lift weights, he feels improved. However, at this moment, he feels the pulsation and has a "film" on his skin (overheating). He was advised to stretch, but not lift weights until his next evaluation with Dr. Davenport 3/7/2024 -he notes Dr. Yao recommended a vascular doctor (Dr. Mcintyre), who found no abnormalities -he notes his EKG and carotid artery evaluations were normal -he notes his brain MRI and EEG were all normal as per Dr. Davenport -he notes he had a cervical MRI 1 week ago, which was normal -he notes his TFTs were normal -he notes Cerave lotion controls his urticaria -he notes weight is stable  -he notes appetite is stable -he notes he had sinus congestion 1 day ago. Last night, he had nasal congestion when he had the flare of his RUQ agitation  -he denies any headaches, nausea, emesis, fever, chills, sweats, chest pain, chest pressure, coughing, wheezing, palpitations, diarrhea, constipation, dysphagia, vertigo, arthralgias, myalgias, leg swelling, itchy eyes, itchy ears, heartburn, reflux, or sour taste in the mouth.

## 2024-02-23 NOTE — PHYSICAL EXAM
[No Acute Distress] : no acute distress [Normal Oropharynx] : normal oropharynx [Normal Appearance] : normal appearance [III] : Mallampati Class: III [No Neck Mass] : no neck mass [Normal Rate/Rhythm] : normal rate/rhythm [Normal S1, S2] : normal s1, s2 [Clear to Auscultation Bilaterally] : clear to auscultation bilaterally [No Resp Distress] : no resp distress [Benign] : benign [No Abnormalities] : no abnormalities [Normal Gait] : normal gait [No Cyanosis] : no cyanosis [No Clubbing] : no clubbing [Normal Color/ Pigmentation] : normal color/ pigmentation [No Edema] : no edema [FROM] : FROM [No Focal Deficits] : no focal deficits [Oriented x3] : oriented x3 [Normal Affect] : normal affect [TextBox_54] : 2/6 systolic murmur [TextBox_68] : I:E 1:3; Clear

## 2024-02-23 NOTE — DISCUSSION/SUMMARY
[FreeTextEntry1] : This is a 83-year-old male with past medical history significant for mitral valve prolapse with moderate mitral valve regurgitation, hypertension, murmur, status post left hip replacement surgery, dyspepsia, reactive airway disease, who comes in for cardiac follow-up evaluation.  He denies chest pain, shortness of breath, dizziness or syncope. The patient reports that his blood pressure slowly increased as he has gotten older. I explained to him that today's blood pressure is acceptable.  He does not wish to have a 24-hour ambulatory blood pressure monitor done.  He remains on ramipril 5 mg per day.  If his blood pressure does remain elevated consideration can be given to switching him to an ARB or increasing his dose of ramipril. He has an appointment to follow-up with his pulmonologist and primary care physician.  He will follow-up with me in 3 months to reevaluate his blood pressure. He also complains of pains in his upper chest wall and sleeps with his right hand across his chest at nighttime.  I recommended that he alter his sleep position which may alleviate some of these other symptoms. Echo Doppler examination done October 26, 2023 demonstrated mild tricuspid valve regurgitation, mild pulmonic valve regurgitation, mild mitral valve regurgitation thickening of the aortic valve leaflets with normal left ventricular ejection fraction is 66%. He is currently hemodynamically stable and asymptomatic from a cardiac standpoint.  He will continue on his current diet and exercise program. Electrocardiogram done October 18, 2023 demonstrated normal sinus rhythm rate 66 bpm is otherwise unremarkable. Blood work done October 2, 2023 demonstrated a cholesterol 169, HDL of 53, triglycerides of 80, LDL calculated 91 mg/dL and non-HDL cholesterol 116 mg/dL and hemoglobin A1c of 5.9. Patient will continue on his current diet and exercise program. He remains stable from a cardiac standpoint. The patient had normal exercise stress test March 15, 2023. Blood work done January 25, 2023 demonstrated a cholesterol 160, HDL 47, triglycerides 79, LDL 97 and non-HDL cholesterol 113 mg/dL.  Patient's hemoglobin A1c was slightly elevated at 5.9. The patient will continue on his current diet and exercise program.  He has been stable on his medications.  He exercises on a regular basis and will continue to do so. Electrocardiogram done March 9, 2022 demonstrate normal sinus rhythm rate 66 bpm and is otherwise unremarkable. The patient had blood work done with his primary care physician February 12/2022 which demonstrated cholesterol 155, HDL 55, triglycerides of 46, and LDL calculated 91 mg/dL. The patient will continue on his current dose of ramipril 5 mg daily his blood pressure seems to be under good control.  He will follow-up with his primary care physician.  He is currently hemodynamically stable and asymptomatic from a cardiac standpoint. Echo Doppler examination done September 1, 2021 demonstrated bileaflet mitral valve prolapse and mild to moderate mitral valve regurgitation, mild to moderate tricuspid valve regurgitation, mild pulmonic valve regurgitation, satisfactory left ventricular function with ejection fraction of 65%.  Lipid panel done October 19, 2021 demonstrated cholesterol 178, HDL of 52, triglycerides of 54, non-HDL cholesterol 126, and LDL calculated 115 mg/dL with hemoglobin A1c of 5.8. He had a normal exercise stress test March 18, 2020. Echo Doppler examination done September 1, 2021 demonstrated mitral valve prolapse with mild to moderate mitral valve regurgitation, mild to moderate tricuspid valve regurgitation, mild pulmonic valve regurgitation with normal left ventricular ejection fraction 65%. Electrocardiogram done November 15, 2021 demonstrate normal sinus rhythm at a rate of 86 bpm is otherwise unremarkable.  The patient understands that aerobic exercises must be increased to 40 minutes 4 times per week. A detailed discussion of lifestyle modification was done today. The patient has a good understanding of the diagnosis, and treatment plan. Lifestyle modification was also outlined.

## 2024-02-23 NOTE — PROCEDURE
[FreeTextEntry1] : Full PFT reveals mild obstructive dysfunction at mid-low volumes; FEV1 was 2.57L which is 99% of predicted; normal lung volumes; normal diffusion at 18.8, which is 120% of predicted; normal flow volume loop. PFTs were performed to evaluate for SOB  FENO was 8; a normal value being less than 25 Fractional exhaled nitric oxide (FENO) is regarded as a simple, noninvasive method for assessing eosinophilic airway inflammation. Produced by a variety of cells within the lung, nitric oxide (NO) concentrations are generally low in healthy individuals. However, high concentrations of NO appear to be involved in nonspecific host defense mechanisms and chronic inflammatory diseases such as asthma. The American Thoracic Society (ATS) therefore has recommended using FENO to aid in the diagnosis and monitoring of eosinophilic airway inflammation and asthma, and for identifying steroid responsive individuals whose chronic respiratory symptoms may be caused by airway inflammation.

## 2024-02-23 NOTE — ADDENDUM
[FreeTextEntry1] : Documented by Lidia Acevedo acting as a scribe for Dr. Turner Sanchez on 02/23/2024. All medical record entries made by the Scribe were at my, Dr. Turner Sanchez's, direction and personally dictated by me on 02/23/2024. I have reviewed the chart and agree that the record accurately reflects my personal performance of the history, physical exam, assessment and plan. I have also personally directed, reviewed, and agree with the discharge instructions.

## 2024-02-23 NOTE — REASON FOR VISIT
[Structural Heart and Valve Disease] : structural heart and valve disease [CV Risk Factors and Non-Cardiac Disease] : CV risk factors and non-cardiac disease [Hyperlipidemia] : hyperlipidemia [Follow-Up - Clinic] : a clinic follow-up of [Hypertension] : hypertension [Mitral Regurgitation] : mitral regurgitation [FreeTextEntry3] : Dr. Quintin Lopez [FreeTextEntry1] : murmur, dyspepsia

## 2024-02-27 RX ORDER — BUDESONIDE AND FORMOTEROL FUMARATE DIHYDRATE 160; 4.5 UG/1; UG/1
160-4.5 AEROSOL RESPIRATORY (INHALATION) TWICE DAILY
Qty: 1 | Refills: 2 | Status: DISCONTINUED | COMMUNITY
Start: 2023-10-18 | End: 2024-02-27

## 2024-02-27 RX ORDER — FAMOTIDINE 20 MG/1
20 TABLET, FILM COATED ORAL
Qty: 90 | Refills: 1 | Status: DISCONTINUED | COMMUNITY
Start: 2021-09-02 | End: 2024-02-27

## 2024-02-27 RX ORDER — HYALURONATE SODIUM 20 MG/2 ML
20 SYRINGE (ML) INTRAARTICULAR
Qty: 1 | Refills: 0 | Status: DISCONTINUED | COMMUNITY
Start: 2023-07-05 | End: 2024-02-27

## 2024-03-19 ENCOUNTER — RX RENEWAL (OUTPATIENT)
Age: 84
End: 2024-03-19

## 2024-03-19 RX ORDER — OMEPRAZOLE 20 MG/1
20 CAPSULE, DELAYED RELEASE ORAL
Qty: 90 | Refills: 1 | Status: ACTIVE | COMMUNITY
Start: 2018-03-08 | End: 1900-01-01

## 2024-05-17 ENCOUNTER — NON-APPOINTMENT (OUTPATIENT)
Age: 84
End: 2024-05-17

## 2024-07-03 ENCOUNTER — NON-APPOINTMENT (OUTPATIENT)
Age: 84
End: 2024-07-03

## 2024-07-08 ENCOUNTER — APPOINTMENT (OUTPATIENT)
Dept: CARDIOLOGY | Facility: CLINIC | Age: 84
End: 2024-07-08
Payer: MEDICARE

## 2024-07-08 ENCOUNTER — APPOINTMENT (OUTPATIENT)
Dept: PULMONOLOGY | Facility: CLINIC | Age: 84
End: 2024-07-08
Payer: MEDICARE

## 2024-07-08 VITALS
HEART RATE: 75 BPM | HEIGHT: 68 IN | RESPIRATION RATE: 16 BRPM | OXYGEN SATURATION: 99 % | WEIGHT: 152 LBS | DIASTOLIC BLOOD PRESSURE: 80 MMHG | TEMPERATURE: 98 F | SYSTOLIC BLOOD PRESSURE: 132 MMHG | BODY MASS INDEX: 23.04 KG/M2

## 2024-07-08 VITALS
RESPIRATION RATE: 16 BRPM | WEIGHT: 156 LBS | TEMPERATURE: 97.1 F | BODY MASS INDEX: 23.64 KG/M2 | DIASTOLIC BLOOD PRESSURE: 80 MMHG | HEIGHT: 68 IN | HEART RATE: 75 BPM | SYSTOLIC BLOOD PRESSURE: 140 MMHG | OXYGEN SATURATION: 96 %

## 2024-07-08 DIAGNOSIS — R09.82 POSTNASAL DRIP: ICD-10-CM

## 2024-07-08 DIAGNOSIS — I10 ESSENTIAL (PRIMARY) HYPERTENSION: ICD-10-CM

## 2024-07-08 DIAGNOSIS — I34.0 NONRHEUMATIC MITRAL (VALVE) INSUFFICIENCY: ICD-10-CM

## 2024-07-08 DIAGNOSIS — I07.1 RHEUMATIC TRICUSPID INSUFFICIENCY: ICD-10-CM

## 2024-07-08 DIAGNOSIS — R06.02 SHORTNESS OF BREATH: ICD-10-CM

## 2024-07-08 DIAGNOSIS — I27.21 SECONDARY PULMONARY ARTERIAL HYPERTENSION: ICD-10-CM

## 2024-07-08 DIAGNOSIS — E78.00 PURE HYPERCHOLESTEROLEMIA, UNSPECIFIED: ICD-10-CM

## 2024-07-08 DIAGNOSIS — K21.9 GASTRO-ESOPHAGEAL REFLUX DISEASE W/OUT ESOPHAGITIS: ICD-10-CM

## 2024-07-08 DIAGNOSIS — I34.1 NONRHEUMATIC MITRAL (VALVE) PROLAPSE: ICD-10-CM

## 2024-07-08 DIAGNOSIS — I37.1 NONRHEUMATIC PULMONARY VALVE INSUFFICIENCY: ICD-10-CM

## 2024-07-08 DIAGNOSIS — I65.29 OCCLUSION AND STENOSIS OF UNSPECIFIED CAROTID ARTERY: ICD-10-CM

## 2024-07-08 DIAGNOSIS — G47.33 OBSTRUCTIVE SLEEP APNEA (ADULT) (PEDIATRIC): ICD-10-CM

## 2024-07-08 DIAGNOSIS — L50.9 URTICARIA, UNSPECIFIED: ICD-10-CM

## 2024-07-08 DIAGNOSIS — R06.09 OTHER FORMS OF DYSPNEA: ICD-10-CM

## 2024-07-08 DIAGNOSIS — J45.20 MILD INTERMITTENT ASTHMA, UNCOMPLICATED: ICD-10-CM

## 2024-07-08 PROCEDURE — 94010 BREATHING CAPACITY TEST: CPT

## 2024-07-08 PROCEDURE — 93015 CV STRESS TEST SUPVJ I&R: CPT

## 2024-07-08 PROCEDURE — 99213 OFFICE O/P EST LOW 20 MIN: CPT | Mod: 25

## 2024-07-08 PROCEDURE — G2211 COMPLEX E/M VISIT ADD ON: CPT

## 2024-07-08 PROCEDURE — 99214 OFFICE O/P EST MOD 30 MIN: CPT | Mod: 25

## 2024-07-08 PROCEDURE — 95012 NITRIC OXIDE EXP GAS DETER: CPT

## 2024-07-08 RX ORDER — ROSUVASTATIN CALCIUM 10 MG/1
10 TABLET, FILM COATED ORAL
Qty: 90 | Refills: 1 | Status: ACTIVE | COMMUNITY
Start: 2024-07-08 | End: 1900-01-01

## 2024-07-11 RX ORDER — CYCLOSPORINE 0.5 MG/ML
0.05 EMULSION OPHTHALMIC
Refills: 0 | Status: ACTIVE | COMMUNITY

## 2024-08-01 ENCOUNTER — OFFICE (OUTPATIENT)
Dept: URBAN - METROPOLITAN AREA CLINIC 90 | Facility: CLINIC | Age: 84
Setting detail: OPHTHALMOLOGY
End: 2024-08-01
Payer: MEDICARE

## 2024-08-01 DIAGNOSIS — H53.19: ICD-10-CM

## 2024-08-01 DIAGNOSIS — H25.12: ICD-10-CM

## 2024-08-01 DIAGNOSIS — H16.223: ICD-10-CM

## 2024-08-01 PROBLEM — H43.393 VITREOUS FLOATERS; BOTH EYES: Status: ACTIVE | Noted: 2024-08-01

## 2024-08-01 PROBLEM — H18.599 OTHER HEREDITARY CORNEAL DYSTROPHIES, UNSPECIFIED EYE ; BOTH EYES: Status: ACTIVE | Noted: 2024-08-01

## 2024-08-01 PROCEDURE — 92014 COMPRE OPH EXAM EST PT 1/>: CPT | Performed by: OPHTHALMOLOGY

## 2024-08-01 ASSESSMENT — CONFRONTATIONAL VISUAL FIELD TEST (CVF)
OD_FINDINGS: FULL
OS_FINDINGS: FULL

## 2024-08-05 ENCOUNTER — APPOINTMENT (OUTPATIENT)
Dept: UROLOGY | Facility: CLINIC | Age: 84
End: 2024-08-05

## 2024-08-05 PROBLEM — N28.89 LEFT RENAL MASS: Status: ACTIVE | Noted: 2024-08-05

## 2024-08-05 PROBLEM — E61.1 IRON DEFICIENCY: Status: ACTIVE | Noted: 2024-08-05

## 2024-08-05 PROBLEM — Z84.1 FAMILY HISTORY OF KIDNEY STONES: Status: ACTIVE | Noted: 2024-08-05

## 2024-08-05 PROBLEM — Z80.42 FAMILY HISTORY OF MALIGNANT NEOPLASM OF PROSTATE: Status: ACTIVE | Noted: 2024-08-05

## 2024-08-05 PROCEDURE — 99204 OFFICE O/P NEW MOD 45 MIN: CPT

## 2024-08-05 NOTE — ASSESSMENT
[FreeTextEntry1] : 83-year-old male who presents with the following  #Left renal mass - MRI reviewed on [facility] patient portal (patient logged in) - 1.7 x 2 cm LEFT renal mass. Well circumscribed. Lesion is medial, just below / abutting the ureter and in close proximity to the renal hilum - We reviewed the possible underlying histology of solid enhancing renal masses, with the majority being malignant (~80%) whereas ~20% are benign (e.g. oncocytoma).  We discussed the role/possibility of percutaneous biopsy, with the associated risks, benefits, complications, and accuracy issues (e.g. risk of false negative results). Options were reviewed including, not limited to, active surveillance (AS), surgical extirpation and ablation.  The risks of tumor growth and metastasis on active surveillance were reviewed, including the fact that metastatic progression on AS could mean missing the opportunity for cure.  The average growth rate of ~2-3 mm/year and metastasis rates of ~2-3% on AS over 5 year interval for small renal masses <4 cm was discussed.   Discussed given close proximity to hilum / ureter - percutaneous options likely not available. Best option either AS or partial nephrectomy. At this time, will start AS - RPA 4 months with MRI prior  #Dysuria - UA - Last PSA wnl - PVR 10 mL

## 2024-08-05 NOTE — LETTER BODY
[Dear  ___] : Dear  [unfilled], [Consult Letter:] : I had the pleasure of evaluating your patient, [unfilled]. [Please see my note below.] : Please see my note below. [Consult Closing:] : Thank you very much for allowing me to participate in the care of this patient.  If you have any questions, please do not hesitate to contact me. [Sincerely,] : Sincerely, [FreeTextEntry2] : Quintin Lopez MD [address] [facility] [address], [place] [address] [FreeTextEntry3] : Jose F Hopper MD [facility] at [address] [place] [address] p: [phone] f: [phone]

## 2024-08-05 NOTE — LETTER BODY
[Dear  ___] : Dear  [unfilled], [Consult Letter:] : I had the pleasure of evaluating your patient, [unfilled]. [Please see my note below.] : Please see my note below. [Consult Closing:] : Thank you very much for allowing me to participate in the care of this patient.  If you have any questions, please do not hesitate to contact me. [Sincerely,] : Sincerely, [FreeTextEntry2] : Quintin Lopez MD [address] [facility] [address], [place] [address] [FreeTextEntry3] : Jose F Hopper MD [facility] at [address], [address] [place] [address] p: [phone] f: [phone]

## 2024-08-05 NOTE — ASSESSMENT
[FreeTextEntry1] : 83-year-old male who presents with the following  #Left renal mass - MRI reviewed on [facility] patient portal (patient logged in) - 1.7 x 2 cm LEFT renal mass. Well circumscribed. Lesion is medial, just below / abutting the ureter and in close proximity to the renal hilum - We reviewed the possible underlying histology of solid enhancing renal masses, with the majority being malignant (~80%) whereas ~20% are benign (e.g. oncocytoma).  We discussed the role/possibility of percutaneous biopsy, with the associated risks, benefits, complications, and accuracy issues (e.g. risk of false negative results). Options were reviewed including, not limited to, active surveillance (AS), surgical extirpation and ablation.  The risks of tumor growth and metastasis on active surveillance were reviewed, including the fact that metastatic progression on AS could mean missing the opportunity for cure.  The average growth rate of ~2-3 mm/year and metastasis rates of ~2-3% on AS over 5 year interval for small renal masses <4 cm was discussed.   Discussed given close proximity to hilum / ureter - percutaneous options likely not available. Best option either AS or partial nephrectomy. At this time, will start AS - RPA 4 months with MRI prior  #Dysuria - UA - Last PSA wnl - PVR 10 mL

## 2024-08-05 NOTE — PHYSICAL EXAM
[Normal Appearance] : normal appearance [] : no respiratory distress [Bowel Sounds] : normal bowel sounds [Urethral Meatus] : meatus normal [Epididymis] : the epididymides were normal [Testes Tenderness] : no tenderness of the testes [Testes Mass (___cm)] : there were no testicular masses [Normal Station and Gait] : the gait and station were normal for the patient's age [Skin Color & Pigmentation] : normal skin color and pigmentation [No Focal Deficits] : no focal deficits [Oriented To Time, Place, And Person] : oriented to person, place, and time [No Palpable Adenopathy] : no palpable adenopathy

## 2024-08-05 NOTE — HISTORY OF PRESENT ILLNESS
[FreeTextEntry1] : JOE RAGLAND is a 83 year M who presents today as a new patient evaluation for renal mass.  He had an MRI of his abdomen by his GI physician 8/1/2024, which showed a 2.0 cm LEFT medial interpolar mass, suspicious for RCC. Non smoker. No family history of  malignancy   Also had a TURP 2022 by Dr Gary Goldberg for bothersome urinary symptoms, mostly urgency and dysuria. Not on any medications for his prostate. Had a reaction to a medication in the past. Has been experiencing intermittent dysuria  Anticoagulation: None All: tetracycline, penicillin  Social: Non smoker PMHx: HTN, HLD PSHx: TURP, left hip replacement FHx: No  malignancy Labs: PSA 0.55 1/2024   Denies gross hematuria, flank pain, fevers, chills, nausea, vomiting.

## 2024-08-06 ENCOUNTER — TRANSCRIPTION ENCOUNTER (OUTPATIENT)
Age: 84
End: 2024-08-06

## 2024-09-10 ENCOUNTER — OFFICE (OUTPATIENT)
Dept: URBAN - METROPOLITAN AREA CLINIC 27 | Facility: CLINIC | Age: 84
Setting detail: OPHTHALMOLOGY
End: 2024-09-10
Payer: MEDICARE

## 2024-09-10 ENCOUNTER — RX ONLY (RX ONLY)
Age: 84
End: 2024-09-10

## 2024-09-10 DIAGNOSIS — H43.813: ICD-10-CM

## 2024-09-10 DIAGNOSIS — H25.12: ICD-10-CM

## 2024-09-10 DIAGNOSIS — H26.491: ICD-10-CM

## 2024-09-10 DIAGNOSIS — H16.223: ICD-10-CM

## 2024-09-10 DIAGNOSIS — H53.19: ICD-10-CM

## 2024-09-10 PROCEDURE — 99214 OFFICE O/P EST MOD 30 MIN: CPT | Performed by: OPHTHALMOLOGY

## 2024-09-10 ASSESSMENT — CONFRONTATIONAL VISUAL FIELD TEST (CVF)
OS_FINDINGS: FULL
OD_FINDINGS: FULL

## 2024-09-16 ENCOUNTER — OFFICE (OUTPATIENT)
Dept: URBAN - METROPOLITAN AREA CLINIC 27 | Facility: CLINIC | Age: 84
Setting detail: OPHTHALMOLOGY
End: 2024-09-16
Payer: MEDICARE

## 2024-09-16 DIAGNOSIS — H25.13: ICD-10-CM

## 2024-09-16 DIAGNOSIS — H16.223: ICD-10-CM

## 2024-09-16 DIAGNOSIS — H25.12: ICD-10-CM

## 2024-09-16 DIAGNOSIS — H53.19: ICD-10-CM

## 2024-09-16 PROBLEM — Z96.1 PSEUDOPHAKIA-1 DAY P/O CAT W/ IOL: Status: ACTIVE | Noted: 2024-09-16

## 2024-09-16 PROCEDURE — 92136 OPHTHALMIC BIOMETRY: CPT | Mod: TC | Performed by: OPHTHALMOLOGY

## 2024-09-16 PROCEDURE — 99213 OFFICE O/P EST LOW 20 MIN: CPT | Performed by: OPHTHALMOLOGY

## 2024-09-16 PROCEDURE — 92136 OPHTHALMIC BIOMETRY: CPT | Mod: 26,LT | Performed by: OPHTHALMOLOGY

## 2024-09-20 ENCOUNTER — RX RENEWAL (OUTPATIENT)
Age: 84
End: 2024-09-20

## 2024-09-24 ENCOUNTER — APPOINTMENT (OUTPATIENT)
Dept: CARDIOLOGY | Facility: CLINIC | Age: 84
End: 2024-09-24
Payer: MEDICARE

## 2024-09-24 ENCOUNTER — NON-APPOINTMENT (OUTPATIENT)
Age: 84
End: 2024-09-24

## 2024-09-24 VITALS
DIASTOLIC BLOOD PRESSURE: 80 MMHG | TEMPERATURE: 97.7 F | WEIGHT: 152 LBS | OXYGEN SATURATION: 95 % | SYSTOLIC BLOOD PRESSURE: 128 MMHG | HEART RATE: 73 BPM | BODY MASS INDEX: 23.04 KG/M2 | HEIGHT: 68 IN

## 2024-09-24 DIAGNOSIS — I07.1 RHEUMATIC TRICUSPID INSUFFICIENCY: ICD-10-CM

## 2024-09-24 DIAGNOSIS — I34.0 NONRHEUMATIC MITRAL (VALVE) INSUFFICIENCY: ICD-10-CM

## 2024-09-24 DIAGNOSIS — E78.00 PURE HYPERCHOLESTEROLEMIA, UNSPECIFIED: ICD-10-CM

## 2024-09-24 DIAGNOSIS — I10 ESSENTIAL (PRIMARY) HYPERTENSION: ICD-10-CM

## 2024-09-24 DIAGNOSIS — Z01.810 ENCOUNTER FOR PREPROCEDURAL CARDIOVASCULAR EXAMINATION: ICD-10-CM

## 2024-09-24 DIAGNOSIS — I34.1 NONRHEUMATIC MITRAL (VALVE) PROLAPSE: ICD-10-CM

## 2024-09-24 DIAGNOSIS — I37.1 NONRHEUMATIC PULMONARY VALVE INSUFFICIENCY: ICD-10-CM

## 2024-09-24 PROCEDURE — G2211 COMPLEX E/M VISIT ADD ON: CPT

## 2024-09-24 PROCEDURE — 93000 ELECTROCARDIOGRAM COMPLETE: CPT | Mod: NC

## 2024-09-24 PROCEDURE — 99215 OFFICE O/P EST HI 40 MIN: CPT

## 2024-09-24 NOTE — DISCUSSION/SUMMARY
[FreeTextEntry1] : Dr. Yao-(PRIOR VISIT and PMH WITH Dr. Yao): This is a 83-year-old male with past medical history significant for mitral valve prolapse with moderate mitral valve regurgitation, hypertension, murmur, status post left hip replacement surgery, dyspepsia, reactive airway disease, who comes in for cardiac follow-up evaluation.  He denies chest pain, shortness of breath, dizziness or syncope. The patient had a normal exercise stress test July 8, 2024. Lipid panel done January 27, 2024 demonstrated cholesterol 171, HDL 52, triglycerides 64, LDL calculated 106 mg/dL, hemoglobin A1c of 5.8. The patient will start Crestor 10 mg daily for primary prevention. The patient is interested in restarting his coated aspirin 81 mg/day for primary prevention.  He is discussed this with his primary care physician, and neurologist who have told him that he may start it.  His issue had to do with a low iron level at 1 point, and his pulmonologist and primary care physician wanted to get an upper endoscopy before starting aspirin.  Electrocardiogram done October 18, 2023 demonstrated normal sinus rhythm rate 66 bpm is otherwise unremarkable. I recommended he make an appoint with Dr. Gates of vascular surgery to evaluate him for these complaints. He is currently stable from a cardiac standpoint. Blood work done October 2, 2023 demonstrated a cholesterol 169, HDL of 53, triglycerides of 80, LDL calculated 91 mg/dL and non-HDL cholesterol 116 mg/dL and hemoglobin A1c of 5.9. Patient will continue on his current diet and exercise program. He remained stable from a cardiac standpoint. The patient had normal exercise stress test March 15, 2023. Blood work done January 25, 2023 demonstrated a cholesterol 160, HDL 47, triglycerides 79, LDL 97 and non-HDL cholesterol 113 mg/dL.  Patient's hemoglobin A1c was slightly elevated at 5.9. The patient will continue on his current diet and exercise program.  He has been stable on his medications.  He exercises on a regular basis and will continue to do so. Electrocardiogram done March 9, 2022 demonstrate normal sinus rhythm rate 66 bpm and is otherwise unremarkable. The patient had blood work done with his primary care physician February 12/2022 which demonstrated cholesterol 155, HDL 55, triglycerides of 46, and LDL calculated 91 mg/dL. The patient will continue on his current dose of ramipril 5 mg daily his blood pressure seems to be under good control.  He will follow-up with his primary care physician.  He is currently hemodynamically stable and asymptomatic from a cardiac standpoint. Echo Doppler examination done September 1, 2021 demonstrated bileaflet mitral valve prolapse and mild to moderate mitral valve regurgitation, mild to moderate tricuspid valve regurgitation, mild pulmonic valve regurgitation, satisfactory left ventricular function with ejection fraction of 65%.  Lipid panel done October 19, 2021 demonstrated cholesterol 178, HDL of 52, triglycerides of 54, non-HDL cholesterol 126, and LDL calculated 115 mg/dL with hemoglobin A1c of 5.8. He had a normal exercise stress test March 18, 2020. Echo Doppler examination done September 1, 2021 demonstrated mitral valve prolapse with mild to moderate mitral valve regurgitation, mild to moderate tricuspid valve regurgitation, mild pulmonic valve regurgitation with normal left ventricular ejection fraction 65%. Electrocardiogram done November 15, 2021 demonstrate normal sinus rhythm at a rate of 86 bpm is otherwise unremarkable.  The patient understands that aerobic exercises must be increased to 40 minutes 4 times per week. A detailed discussion of lifestyle modification was done today. The patient has a good understanding of the diagnosis, and treatment plan. Lifestyle modification was also outlined.

## 2024-09-24 NOTE — PHYSICAL EXAM
[Well Developed] : well developed [Well Nourished] : well nourished [No Acute Distress] : no acute distress [Normal Venous Pressure] : normal venous pressure [No Carotid Bruit] : no carotid bruit [Normal S1, S2] : normal S1, S2 [No Rub] : no rub [No Gallop] : no gallop heard [II] : a grade 2 [No Abnormalities] : the abdominal aorta was not enlarged and no bruit was heard [Clear Lung Fields] : clear lung fields [Good Air Entry] : good air entry [No Respiratory Distress] : no respiratory distress  [Soft] : abdomen soft [Non Tender] : non-tender [No Masses/organomegaly] : no masses/organomegaly [Normal Bowel Sounds] : normal bowel sounds [Normal Gait] : normal gait [No Edema] : no edema [No Cyanosis] : no cyanosis [No Clubbing] : no clubbing [No Varicosities] : no varicosities [No Rash] : no rash [No Skin Lesions] : no skin lesions [Moves all extremities] : moves all extremities [No Focal Deficits] : no focal deficits [Normal Speech] : normal speech [Alert and Oriented] : alert and oriented [Normal memory] : normal memory [General Appearance - Well Developed] : well developed [Normal Appearance] : normal appearance [Well Groomed] : well groomed [General Appearance - Well Nourished] : well nourished [No Deformities] : no deformities [General Appearance - In No Acute Distress] : no acute distress [Normal Conjunctiva] : the conjunctiva exhibited no abnormalities [Normal Oral Mucosa] : normal oral mucosa [Normal Oropharynx] : normal oropharynx [Normal Jugular Venous A Waves Present] : normal jugular venous A waves present [Normal Jugular Venous V Waves Present] : normal jugular venous V waves present [No Jugular Venous Merino A Waves] : no jugular venous merino A waves [Exaggerated Use Of Accessory Muscles For Inspiration] : no accessory muscle use [Respiration, Rhythm And Depth] : normal respiratory rhythm and effort [Auscultation Breath Sounds / Voice Sounds] : lungs were clear to auscultation bilaterally [Bowel Sounds] : normal bowel sounds [Abdomen Soft] : soft [Abnormal Walk] : normal gait [Nail Clubbing] : no clubbing of the fingernails [Cyanosis, Localized] : no localized cyanosis [Skin Color & Pigmentation] : normal skin color and pigmentation [Skin Turgor] : normal skin turgor [] : no rash [Oriented To Time, Place, And Person] : oriented to person, place, and time [Impaired Insight] : insight and judgment were intact [Affect] : the affect was normal [Mood] : the mood was normal [5th Left ICS - MCL] : palpated at the 5th LICS in the midclavicular line [Normal] : normal [No Precordial Heave] : no precordial heave was noted [Normal Rate] : normal [Rhythm Regular] : regular [Normal S1] : normal S1 [Normal S2] : normal S2 [I] : a grade 1 [2+] : left 2+ [No Pitting Edema] : no pitting edema present

## 2024-09-24 NOTE — REASON FOR VISIT
[CV Risk Factors and Non-Cardiac Disease] : CV risk factors and non-cardiac disease [Structural Heart and Valve Disease] : structural heart and valve disease [Hyperlipidemia] : hyperlipidemia [Follow-Up - Clinic] : a clinic follow-up of [Hypertension] : hypertension [Mitral Regurgitation] : mitral regurgitation [FreeTextEntry3] : Dr. Quintin Lopez [FreeTextEntry1] : murmur, dyspepsia

## 2024-09-24 NOTE — ASSESSMENT
[FreeTextEntry1] : This is a 84-year-old male here today for follow-Up cardiac evaluation.  He has a past medical history significant for mitral valve prolapse with moderate mitral valve regurgitation, hypertension, murmur, status post left hip replacement surgery, dyspepsia, reactive airway disease   HPI: He is feeling generally well today and denies chest pain, dizziness, heart palpitations, recent episodes of syncope or falls, SOB, or dyspnea at this time. He presents today for cardiac clearance for a scheduled left cataract extraction on 10/09/2024 with Dr. Pavel Velazquez.   Current Medications: Rosuvastatin 10 mg daily and Ramipril 5 mg daily.   Reports stopping his Aspirin 81 mg daily once he began taking Rosuvastatin as it was only for primary prevention.    BLOOD PRESSURE: -BP is controlled in today's visit.   BLOOD WORK: -Lipid panel done August 2024 demonstrated triglycerides 49, cholesterol 126, HDL 55, LDL 61, Non-HDL 72, LDL direct 63.  -Lipid panel done January 27, 2024 demonstrated cholesterol 171, HDL 52, triglycerides 64, LDL calculated 106 mg/dL, hemoglobin A1c of 5.8. -Blood work done October 2, 2023 demonstrated a cholesterol 169, HDL of 53, triglycerides of 80, LDL calculated 91 mg/dL and non-HDL cholesterol 116 mg/dL and hemoglobin A1c of 5.9. -Blood work done January 25, 2023 demonstrated a cholesterol 160, HDL 47, triglycerides 79, LDL 97 and non-HDL cholesterol 113 mg/dL.  Patient's hemoglobin A1c was slightly elevated at 5.9.   TESTING/REPORTS: -EKG done 09/24/2024 demonstrated regular sinus rhythm rate 69 bpm otherwise unremarkable.   -The patient had a normal exercise stress test July 8, 2024.  -Echocardiogram done October 2023 demonstrated normal left ventricular systolic function EF 66%, mild mitral regurgitation, mild tricuspid regurgitation, mild pulmonic regurgitation.   -Electrocardiogram done October 18, 2023 demonstrated normal sinus rhythm rate 66 bpm is otherwise unremarkable.  -The patient had normal exercise stress test March 15, 2023.  -EKG done Sep 23, 2022 which demonstrated regular sinus rhythm with nonspecific ST-T wave changes, BPM of 68.  -Electrocardiogram done March 9, 2022 demonstrate normal sinus rhythm rate 66 bpm and is otherwise unremarkable.  -Echo Doppler examination done September 1, 2021 demonstrated bileaflet mitral valve prolapse and mild to moderate mitral valve regurgitation, mild to moderate tricuspid valve regurgitation, mild pulmonic valve regurgitation, satisfactory left ventricular function with ejection fraction of 65%.  -He had a normal exercise stress test March 18, 2020.  -Echo Doppler examination done September 1, 2021 demonstrated mitral valve prolapse with mild to moderate mitral valve regurgitation, mild to moderate tricuspid valve regurgitation, mild pulmonic valve regurgitation with normal left ventricular ejection fraction 65%.  -Electrocardiogram done November 15, 2021 demonstrate normal sinus rhythm at a rate of 86 bpm is otherwise unremarkable.   PLAN:  *** The patient is clear from a cardiac standpoint. Echocardiogram done October 2023 with EF 66%. Please avoid overhydration. The patient should be allowed to take their usual medications in the perioperative period. Maintain proper DVT prophylaxis ***    -He will continue with his current medications and will contact the office if he is having any complaints between now and their next follow up appointment.   I have discussed the plan of care with JOE RAGLAND and he will follow up in 3 months. They are compliant with all of their medications.     The patient understands that aerobic exercises must be increased to 40 minutes 4 times/week and a detailed discussion of lifestyle modification was done today.   The patient has a good understanding of the diagnosis, treatment plan and lifestyle modification.   They will contact me at the office for any questions with their care or any changes in their health status.   The patient was discussed with supervision physician Dr. Billy Yao at the time of the visit and the plan of care will be carried out as noted above.     SPENCER Romano NP

## 2024-10-09 ENCOUNTER — AMBULATORY SURGERY CENTER (OUTPATIENT)
Dept: URBAN - METROPOLITAN AREA SURGERY 21 | Facility: SURGERY | Age: 84
Setting detail: OPHTHALMOLOGY
End: 2024-10-09
Payer: MEDICARE

## 2024-10-09 DIAGNOSIS — H25.12: ICD-10-CM

## 2024-10-09 DIAGNOSIS — H52.212: ICD-10-CM

## 2024-10-09 PROCEDURE — 66984 XCAPSL CTRC RMVL W/O ECP: CPT | Mod: LT | Performed by: OPHTHALMOLOGY

## 2024-10-09 PROCEDURE — A9270 NON-COVERED ITEM OR SERVICE: HCPCS | Mod: GY | Performed by: OPHTHALMOLOGY

## 2024-10-09 PROCEDURE — FEMTO FEMTOSECOND LASER: Mod: GY | Performed by: OPHTHALMOLOGY

## 2024-10-10 ENCOUNTER — OFFICE (OUTPATIENT)
Dept: URBAN - METROPOLITAN AREA CLINIC 27 | Facility: CLINIC | Age: 84
Setting detail: OPHTHALMOLOGY
End: 2024-10-10
Payer: MEDICARE

## 2024-10-10 ENCOUNTER — RX ONLY (RX ONLY)
Age: 84
End: 2024-10-10

## 2024-10-10 DIAGNOSIS — H16.223: ICD-10-CM

## 2024-10-10 DIAGNOSIS — H26.491: ICD-10-CM

## 2024-10-10 DIAGNOSIS — Z96.1: ICD-10-CM

## 2024-10-10 PROCEDURE — 99024 POSTOP FOLLOW-UP VISIT: CPT | Performed by: OPHTHALMOLOGY

## 2024-10-10 ASSESSMENT — KERATOMETRY
OS_K2POWER_DIOPTERS: 45.50
OD_K2POWER_DIOPTERS: 45.25
OD_K1POWER_DIOPTERS: 44.50
OS_K1POWER_DIOPTERS: 44.00
OD_AXISANGLE_DEGREES: 085
METHOD_AUTO_MANUAL: AUTO
OS_AXISANGLE_DEGREES: 115

## 2024-10-10 ASSESSMENT — REFRACTION_MANIFEST
OS_CYLINDER: -0.75
OS_VA1: 20/25+-
OD_SPHERE: -3.00
OD_CYLINDER: -0.25
OD_VA1: 20/25+
OS_AXIS: 005
OD_AXIS: 160
OS_SPHERE: -3.00

## 2024-10-10 ASSESSMENT — REFRACTION_AUTOREFRACTION
OS_AXIS: 112
OD_CYLINDER: +0.50
OD_SPHERE: -3.00
OD_AXIS: 051
OS_SPHERE: -2.50
OS_CYLINDER: +1.25

## 2024-10-10 ASSESSMENT — REFRACTION_CURRENTRX
OS_AXIS: 001
OD_SPHERE: -2.25
OD_OVR_VA: 20/
OD_CYLINDER: +0.50
OD_CYLINDER: +0.25
OS_CYLINDER: -0.75
OS_SPHERE: -2.50
OS_AXIS: 098
OS_SPHERE: -3.00
OD_AXIS: 180
OD_SPHERE: -2.75
OS_AXIS: 174
OD_OVR_VA: 20/
OS_SPHERE: -1.75
OD_AXIS: 49
OD_OVR_VA: 20/
OS_OVR_VA: 20/
OD_SPHERE: -3.25
OS_CYLINDER: +0.75
OS_CYLINDER: +0.75
OS_OVR_VA: 20/
OD_CYLINDER: -0.50
OS_OVR_VA: 20/
OD_AXIS: 93

## 2024-10-10 ASSESSMENT — VISUAL ACUITY
OS_BCVA: 20/25-1+1
OD_BCVA: 20/25

## 2024-10-10 ASSESSMENT — TONOMETRY: OD_IOP_MMHG: 16

## 2024-10-16 ENCOUNTER — OFFICE (OUTPATIENT)
Dept: URBAN - METROPOLITAN AREA CLINIC 27 | Facility: CLINIC | Age: 84
Setting detail: OPHTHALMOLOGY
End: 2024-10-16
Payer: MEDICARE

## 2024-10-16 DIAGNOSIS — H16.223: ICD-10-CM

## 2024-10-16 DIAGNOSIS — H26.491: ICD-10-CM

## 2024-10-16 DIAGNOSIS — Z96.1: ICD-10-CM

## 2024-10-16 PROCEDURE — 99024 POSTOP FOLLOW-UP VISIT: CPT | Performed by: OPHTHALMOLOGY

## 2024-10-16 ASSESSMENT — REFRACTION_CURRENTRX
OD_AXIS: 180
OD_AXIS: 49
OS_CYLINDER: +0.75
OD_CYLINDER: -0.50
OS_AXIS: 174
OD_SPHERE: -2.25
OS_CYLINDER: +0.75
OD_OVR_VA: 20/
OD_OVR_VA: 20/
OS_AXIS: 001
OS_OVR_VA: 20/
OD_SPHERE: -2.75
OS_SPHERE: -1.75
OS_OVR_VA: 20/
OD_SPHERE: -3.25
OS_SPHERE: -3.00
OS_OVR_VA: 20/
OD_OVR_VA: 20/
OS_AXIS: 098
OS_SPHERE: -2.50
OD_CYLINDER: +0.25
OD_CYLINDER: +0.50
OD_AXIS: 93
OS_CYLINDER: -0.75

## 2024-10-16 ASSESSMENT — REFRACTION_MANIFEST
OD_CYLINDER: -0.25
OD_VA1: 20/25+
OD_SPHERE: -3.00
OS_VA1: 20/25+-
OD_AXIS: 160
OS_AXIS: 005
OS_SPHERE: -3.00
OS_CYLINDER: -0.75

## 2024-10-16 ASSESSMENT — KERATOMETRY
OD_K2POWER_DIOPTERS: 45.25
OS_AXISANGLE_DEGREES: 138
OD_K1POWER_DIOPTERS: 44.25
METHOD_AUTO_MANUAL: AUTO
OS_K2POWER_DIOPTERS: 45.25
OD_AXISANGLE_DEGREES: 32
OS_K1POWER_DIOPTERS: 44.25

## 2024-10-16 ASSESSMENT — REFRACTION_AUTOREFRACTION
OD_CYLINDER: +0.75
OD_SPHERE: -3.50
OD_AXIS: 40
OS_CYLINDER: +0.75
OS_AXIS: 146
OS_SPHERE: -2.25

## 2024-10-16 ASSESSMENT — TONOMETRY
OD_IOP_MMHG: 16
OS_IOP_MMHG: 19
OS_IOP_MMHG: 17

## 2024-10-16 ASSESSMENT — VISUAL ACUITY
OD_BCVA: 20/20
OS_BCVA: 20/30

## 2024-11-06 ENCOUNTER — OFFICE (OUTPATIENT)
Dept: URBAN - METROPOLITAN AREA CLINIC 27 | Facility: CLINIC | Age: 84
Setting detail: OPHTHALMOLOGY
End: 2024-11-06
Payer: MEDICARE

## 2024-11-06 DIAGNOSIS — H16.223: ICD-10-CM

## 2024-11-06 DIAGNOSIS — H26.491: ICD-10-CM

## 2024-11-06 DIAGNOSIS — Z96.1: ICD-10-CM

## 2024-11-06 PROCEDURE — 99024 POSTOP FOLLOW-UP VISIT: CPT | Performed by: OPHTHALMOLOGY

## 2024-11-06 ASSESSMENT — TONOMETRY
OS_IOP_MMHG: 15
OS_IOP_MMHG: 15
OD_IOP_MMHG: 16

## 2024-11-06 ASSESSMENT — REFRACTION_CURRENTRX
OS_SPHERE: -3.00
OD_AXIS: 49
OD_CYLINDER: +0.50
OS_OVR_VA: 20/
OS_AXIS: 001
OD_AXIS: 93
OS_AXIS: 174
OD_SPHERE: -2.25
OS_OVR_VA: 20/
OS_SPHERE: -2.50
OS_CYLINDER: +0.75
OS_OVR_VA: 20/
OD_CYLINDER: +0.25
OD_AXIS: 180
OS_CYLINDER: -0.75
OS_SPHERE: -1.75
OD_SPHERE: -3.25
OD_OVR_VA: 20/
OD_SPHERE: -2.75
OS_AXIS: 098
OS_CYLINDER: +0.75
OD_CYLINDER: -0.50

## 2024-11-06 ASSESSMENT — REFRACTION_AUTOREFRACTION
OD_AXIS: 38
OD_SPHERE: -3.00
OS_AXIS: 141
OD_CYLINDER: +0.75
OS_SPHERE: -2.25
OS_CYLINDER: +1.00

## 2024-11-06 ASSESSMENT — REFRACTION_MANIFEST
OS_SPHERE: -3.00
OS_CYLINDER: -0.75
OS_AXIS: 005
OD_CYLINDER: -0.25
OD_VA1: 20/25+
OD_AXIS: 160
OS_VA1: 20/25+-
OD_SPHERE: -3.00

## 2024-11-06 ASSESSMENT — CONFRONTATIONAL VISUAL FIELD TEST (CVF)
OS_FINDINGS: FULL
OD_FINDINGS: FULL

## 2024-11-06 ASSESSMENT — KERATOMETRY
OS_K1POWER_DIOPTERS: 44.25
OD_K2POWER_DIOPTERS: 45.00
METHOD_AUTO_MANUAL: AUTO
OD_K1POWER_DIOPTERS: 43.75
OD_AXISANGLE_DEGREES: 17
OS_AXISANGLE_DEGREES: 138
OS_K2POWER_DIOPTERS: 45.25

## 2024-11-06 ASSESSMENT — VISUAL ACUITY
OS_BCVA: 20/20-2
OD_BCVA: 20/40

## 2024-11-15 ENCOUNTER — APPOINTMENT (OUTPATIENT)
Dept: ORTHOPEDIC SURGERY | Facility: CLINIC | Age: 84
End: 2024-11-15
Payer: MEDICARE

## 2024-11-15 VITALS — BODY MASS INDEX: 23.49 KG/M2 | WEIGHT: 155 LBS | HEIGHT: 68 IN

## 2024-11-15 DIAGNOSIS — M17.11 UNILATERAL PRIMARY OSTEOARTHRITIS, RIGHT KNEE: ICD-10-CM

## 2024-11-15 PROCEDURE — 99214 OFFICE O/P EST MOD 30 MIN: CPT | Mod: 25

## 2024-11-15 PROCEDURE — 73564 X-RAY EXAM KNEE 4 OR MORE: CPT | Mod: RT

## 2024-11-15 PROCEDURE — 20610 DRAIN/INJ JOINT/BURSA W/O US: CPT | Mod: RT

## 2024-11-26 ENCOUNTER — APPOINTMENT (OUTPATIENT)
Dept: CARDIOLOGY | Facility: CLINIC | Age: 84
End: 2024-11-26
Payer: MEDICARE

## 2024-11-26 ENCOUNTER — APPOINTMENT (OUTPATIENT)
Dept: PULMONOLOGY | Facility: CLINIC | Age: 84
End: 2024-11-26
Payer: MEDICARE

## 2024-11-26 VITALS
BODY MASS INDEX: 23.79 KG/M2 | OXYGEN SATURATION: 95 % | TEMPERATURE: 98.7 F | RESPIRATION RATE: 16 BRPM | SYSTOLIC BLOOD PRESSURE: 126 MMHG | WEIGHT: 157 LBS | HEART RATE: 82 BPM | HEIGHT: 68 IN | DIASTOLIC BLOOD PRESSURE: 78 MMHG

## 2024-11-26 VITALS
TEMPERATURE: 97.9 F | RESPIRATION RATE: 16 BRPM | WEIGHT: 156 LBS | OXYGEN SATURATION: 96 % | DIASTOLIC BLOOD PRESSURE: 76 MMHG | SYSTOLIC BLOOD PRESSURE: 122 MMHG | HEART RATE: 86 BPM | HEIGHT: 68 IN | BODY MASS INDEX: 23.64 KG/M2

## 2024-11-26 DIAGNOSIS — I37.1 NONRHEUMATIC PULMONARY VALVE INSUFFICIENCY: ICD-10-CM

## 2024-11-26 DIAGNOSIS — G47.33 OBSTRUCTIVE SLEEP APNEA (ADULT) (PEDIATRIC): ICD-10-CM

## 2024-11-26 DIAGNOSIS — R06.02 SHORTNESS OF BREATH: ICD-10-CM

## 2024-11-26 DIAGNOSIS — E78.00 PURE HYPERCHOLESTEROLEMIA, UNSPECIFIED: ICD-10-CM

## 2024-11-26 DIAGNOSIS — I27.21 SECONDARY PULMONARY ARTERIAL HYPERTENSION: ICD-10-CM

## 2024-11-26 DIAGNOSIS — Z80.42 FAMILY HISTORY OF MALIGNANT NEOPLASM OF PROSTATE: ICD-10-CM

## 2024-11-26 DIAGNOSIS — I10 ESSENTIAL (PRIMARY) HYPERTENSION: ICD-10-CM

## 2024-11-26 DIAGNOSIS — J45.20 MILD INTERMITTENT ASTHMA, UNCOMPLICATED: ICD-10-CM

## 2024-11-26 DIAGNOSIS — I07.1 RHEUMATIC TRICUSPID INSUFFICIENCY: ICD-10-CM

## 2024-11-26 DIAGNOSIS — R09.82 POSTNASAL DRIP: ICD-10-CM

## 2024-11-26 DIAGNOSIS — I34.0 NONRHEUMATIC MITRAL (VALVE) INSUFFICIENCY: ICD-10-CM

## 2024-11-26 DIAGNOSIS — I34.1 NONRHEUMATIC MITRAL (VALVE) PROLAPSE: ICD-10-CM

## 2024-11-26 DIAGNOSIS — J45.909 UNSPECIFIED ASTHMA, UNCOMPLICATED: ICD-10-CM

## 2024-11-26 DIAGNOSIS — Z84.1 FAMILY HISTORY OF DISORDERS OF KIDNEY AND URETER: ICD-10-CM

## 2024-11-26 DIAGNOSIS — K21.9 GASTRO-ESOPHAGEAL REFLUX DISEASE W/OUT ESOPHAGITIS: ICD-10-CM

## 2024-11-26 PROCEDURE — 99214 OFFICE O/P EST MOD 30 MIN: CPT

## 2024-11-26 PROCEDURE — 94010 BREATHING CAPACITY TEST: CPT

## 2024-11-26 PROCEDURE — 99214 OFFICE O/P EST MOD 30 MIN: CPT | Mod: 25

## 2024-11-26 PROCEDURE — 95012 NITRIC OXIDE EXP GAS DETER: CPT

## 2024-11-26 PROCEDURE — G2211 COMPLEX E/M VISIT ADD ON: CPT

## 2024-11-26 RX ORDER — TOLTERODINE TARTRATE 4 MG/1
4 CAPSULE, EXTENDED RELEASE ORAL
Refills: 0 | Status: ACTIVE | COMMUNITY

## 2024-12-05 ENCOUNTER — APPOINTMENT (OUTPATIENT)
Dept: MRI IMAGING | Facility: CLINIC | Age: 84
End: 2024-12-05

## 2024-12-16 ENCOUNTER — APPOINTMENT (OUTPATIENT)
Dept: UROLOGY | Facility: CLINIC | Age: 84
End: 2024-12-16

## 2024-12-21 ENCOUNTER — NON-APPOINTMENT (OUTPATIENT)
Age: 84
End: 2024-12-21

## 2025-01-10 ENCOUNTER — OFFICE (OUTPATIENT)
Dept: URBAN - METROPOLITAN AREA CLINIC 27 | Facility: CLINIC | Age: 85
Setting detail: OPHTHALMOLOGY
End: 2025-01-10
Payer: MEDICARE

## 2025-01-10 DIAGNOSIS — H16.223: ICD-10-CM

## 2025-01-10 PROCEDURE — 92012 INTRM OPH EXAM EST PATIENT: CPT | Performed by: OPTOMETRIST

## 2025-01-10 ASSESSMENT — TONOMETRY
OD_IOP_MMHG: 17
OS_IOP_MMHG: 15

## 2025-01-10 ASSESSMENT — REFRACTION_AUTOREFRACTION
OS_SPHERE: -2.25
OD_AXIS: 054
OD_CYLINDER: +0.75
OS_CYLINDER: +1.00
OD_SPHERE: -3.25
OS_AXIS: 138

## 2025-01-10 ASSESSMENT — REFRACTION_CURRENTRX
OS_OVR_VA: 20/
OD_OVR_VA: 20/
OD_CYLINDER: +0.50
OS_AXIS: 174
OS_AXIS: 001
OS_CYLINDER: +0.75
OS_SPHERE: -2.50
OD_CYLINDER: +0.25
OD_AXIS: 93
OD_SPHERE: -2.75
OD_SPHERE: -2.25
OS_OVR_VA: 20/
OD_SPHERE: -3.25
OD_OVR_VA: 20/
OD_AXIS: 180
OD_CYLINDER: -0.50
OS_SPHERE: -3.00
OD_AXIS: 49
OS_AXIS: 098
OS_OVR_VA: 20/
OS_SPHERE: -1.75
OS_CYLINDER: -0.75
OS_CYLINDER: +0.75
OD_OVR_VA: 20/

## 2025-01-10 ASSESSMENT — KERATOMETRY
OD_K1POWER_DIOPTERS: 44.25
OS_K1POWER_DIOPTERS: 44.25
OD_K2POWER_DIOPTERS: 45.00
OS_AXISANGLE_DEGREES: 118
OS_K2POWER_DIOPTERS: 45.50
METHOD_AUTO_MANUAL: AUTO
OD_AXISANGLE_DEGREES: 060

## 2025-01-10 ASSESSMENT — VISUAL ACUITY
OD_BCVA: 20/40+2
OS_BCVA: 20/25-2

## 2025-01-10 ASSESSMENT — REFRACTION_MANIFEST
OD_AXIS: 160
OD_CYLINDER: -0.25
OS_SPHERE: -3.00
OS_CYLINDER: -0.75
OS_VA1: 20/25+-
OD_SPHERE: -3.00
OS_AXIS: 005
OD_VA1: 20/25+

## 2025-01-10 ASSESSMENT — SUPERFICIAL PUNCTATE KERATITIS (SPK)
OD_SPK: 2+
OS_SPK: 2+

## 2025-01-21 ENCOUNTER — NON-APPOINTMENT (OUTPATIENT)
Age: 85
End: 2025-01-21

## 2025-01-21 ENCOUNTER — APPOINTMENT (OUTPATIENT)
Dept: ORTHOPEDIC SURGERY | Facility: CLINIC | Age: 85
End: 2025-01-21
Payer: MEDICARE

## 2025-01-21 VITALS — WEIGHT: 157 LBS | HEIGHT: 68 IN | BODY MASS INDEX: 23.79 KG/M2

## 2025-01-21 PROCEDURE — 99203 OFFICE O/P NEW LOW 30 MIN: CPT | Mod: 25

## 2025-01-21 PROCEDURE — 20610 DRAIN/INJ JOINT/BURSA W/O US: CPT | Mod: RT

## 2025-01-31 ENCOUNTER — APPOINTMENT (OUTPATIENT)
Dept: ORTHOPEDIC SURGERY | Facility: CLINIC | Age: 85
End: 2025-01-31
Payer: MEDICARE

## 2025-01-31 VITALS — WEIGHT: 157 LBS | HEIGHT: 68 IN | BODY MASS INDEX: 23.79 KG/M2

## 2025-01-31 DIAGNOSIS — M70.61 TROCHANTERIC BURSITIS, RIGHT HIP: ICD-10-CM

## 2025-01-31 PROCEDURE — 73502 X-RAY EXAM HIP UNI 2-3 VIEWS: CPT | Mod: 26,RT

## 2025-01-31 PROCEDURE — 99214 OFFICE O/P EST MOD 30 MIN: CPT

## 2025-02-03 ENCOUNTER — APPOINTMENT (OUTPATIENT)
Dept: ORTHOPEDIC SURGERY | Facility: CLINIC | Age: 85
End: 2025-02-03
Payer: MEDICARE

## 2025-02-03 VITALS — WEIGHT: 155 LBS | HEIGHT: 68 IN | BODY MASS INDEX: 23.49 KG/M2

## 2025-02-03 DIAGNOSIS — M54.16 RADICULOPATHY, LUMBAR REGION: ICD-10-CM

## 2025-02-03 DIAGNOSIS — M51.369: ICD-10-CM

## 2025-02-03 DIAGNOSIS — M43.16 SPONDYLOLISTHESIS, LUMBAR REGION: ICD-10-CM

## 2025-02-03 PROCEDURE — 72100 X-RAY EXAM L-S SPINE 2/3 VWS: CPT

## 2025-02-03 PROCEDURE — 99214 OFFICE O/P EST MOD 30 MIN: CPT

## 2025-02-07 ENCOUNTER — OFFICE (OUTPATIENT)
Dept: URBAN - METROPOLITAN AREA CLINIC 27 | Facility: CLINIC | Age: 85
Setting detail: OPHTHALMOLOGY
End: 2025-02-07
Payer: MEDICARE

## 2025-02-07 DIAGNOSIS — H16.223: ICD-10-CM

## 2025-02-07 PROBLEM — H18.513 ENDOTHELIAL CORNEAL DYSTROPHY; BOTH EYES: Status: ACTIVE | Noted: 2025-02-07

## 2025-02-07 PROCEDURE — 99213 OFFICE O/P EST LOW 20 MIN: CPT | Performed by: OPTOMETRIST

## 2025-02-07 ASSESSMENT — SUPERFICIAL PUNCTATE KERATITIS (SPK)
OD_SPK: T
OS_SPK: T

## 2025-02-07 ASSESSMENT — TONOMETRY
OS_IOP_MMHG: 16
OD_IOP_MMHG: 17

## 2025-02-07 ASSESSMENT — REFRACTION_CURRENTRX
OS_OVR_VA: 20/
OD_CYLINDER: -0.50
OD_AXIS: 93
OD_OVR_VA: 20/
OS_CYLINDER: +0.75
OD_CYLINDER: +0.25
OD_SPHERE: -2.25
OS_CYLINDER: +0.75
OS_AXIS: 001
OD_AXIS: 180
OS_OVR_VA: 20/
OS_SPHERE: -2.50
OD_CYLINDER: +0.50
OD_AXIS: 49
OD_SPHERE: -3.25
OD_OVR_VA: 20/
OS_AXIS: 098
OS_SPHERE: -3.00
OS_CYLINDER: -0.75
OS_OVR_VA: 20/
OS_SPHERE: -1.75
OS_AXIS: 174
OD_OVR_VA: 20/
OD_SPHERE: -2.75

## 2025-02-07 ASSESSMENT — REFRACTION_MANIFEST
OS_SPHERE: -3.00
OD_CYLINDER: -0.25
OS_AXIS: 005
OS_CYLINDER: -0.75
OS_VA1: 20/25+-
OD_SPHERE: -3.00
OD_AXIS: 160
OD_VA1: 20/25+

## 2025-02-07 ASSESSMENT — REFRACTION_AUTOREFRACTION
OS_SPHERE: -2.00
OD_AXIS: 60
OS_CYLINDER: +0.75
OD_SPHERE: -3.25
OD_CYLINDER: +0.75
OS_AXIS: 152

## 2025-02-07 ASSESSMENT — KERATOMETRY
OD_AXISANGLE_DEGREES: 60
OD_K2POWER_DIOPTERS: 45.50
METHOD_AUTO_MANUAL: AUTO
OD_K1POWER_DIOPTERS: 44.25
OS_AXISANGLE_DEGREES: 127
OS_K2POWER_DIOPTERS: 45.50
OS_K1POWER_DIOPTERS: 44.25

## 2025-02-07 ASSESSMENT — VISUAL ACUITY
OD_BCVA: 20/30-1
OS_BCVA: 20/40

## 2025-03-03 ENCOUNTER — RX ONLY (RX ONLY)
Age: 85
End: 2025-03-03

## 2025-03-03 ENCOUNTER — OFFICE (OUTPATIENT)
Dept: URBAN - METROPOLITAN AREA CLINIC 27 | Facility: CLINIC | Age: 85
Setting detail: OPHTHALMOLOGY
End: 2025-03-03
Payer: MEDICARE

## 2025-03-03 DIAGNOSIS — H57.13: ICD-10-CM

## 2025-03-03 DIAGNOSIS — H18.593: ICD-10-CM

## 2025-03-03 DIAGNOSIS — H16.223: ICD-10-CM

## 2025-03-03 PROCEDURE — 99213 OFFICE O/P EST LOW 20 MIN: CPT | Performed by: OPHTHALMOLOGY

## 2025-03-03 ASSESSMENT — REFRACTION_CURRENTRX
OD_CYLINDER: -0.50
OD_AXIS: 180
OD_OVR_VA: 20/
OD_AXIS: 93
OD_OVR_VA: 20/
OS_SPHERE: -3.00
OS_OVR_VA: 20/
OS_CYLINDER: -0.75
OD_SPHERE: -2.25
OS_AXIS: 098
OD_AXIS: 49
OD_CYLINDER: +0.25
OD_OVR_VA: 20/
OS_CYLINDER: +0.75
OS_AXIS: 001
OD_CYLINDER: +0.50
OS_OVR_VA: 20/
OD_SPHERE: -2.75
OD_SPHERE: -3.25
OS_CYLINDER: +0.75
OS_SPHERE: -1.75
OS_AXIS: 174
OS_OVR_VA: 20/
OS_SPHERE: -2.50

## 2025-03-03 ASSESSMENT — VISUAL ACUITY
OD_BCVA: 20/25
OS_BCVA: 20/30

## 2025-03-03 ASSESSMENT — REFRACTION_MANIFEST
OD_CYLINDER: -0.25
OS_VA1: 20/25+-
OD_AXIS: 160
OS_CYLINDER: -0.75
OD_SPHERE: -3.00
OS_AXIS: 005
OD_VA1: 20/25+
OS_SPHERE: -3.00

## 2025-03-03 ASSESSMENT — TONOMETRY
OD_IOP_MMHG: 18
OD_IOP_MMHG: 15
OS_IOP_MMHG: 16
OS_IOP_MMHG: 17

## 2025-03-03 ASSESSMENT — KERATOMETRY
OD_K1POWER_DIOPTERS: 44.25
OS_K2POWER_DIOPTERS: 45.25
OS_AXISANGLE_DEGREES: 122
OS_K1POWER_DIOPTERS: 44.25
OD_AXISANGLE_DEGREES: 94
METHOD_AUTO_MANUAL: AUTO
OD_K2POWER_DIOPTERS: 45.25

## 2025-03-03 ASSESSMENT — SUPERFICIAL PUNCTATE KERATITIS (SPK)
OD_SPK: T
OS_SPK: T

## 2025-03-03 ASSESSMENT — REFRACTION_AUTOREFRACTION
OD_AXIS: 76
OD_SPHERE: -3.25
OS_AXIS: 121
OS_CYLINDER: +1.25
OD_CYLINDER: +0.75
OS_SPHERE: -2.50

## 2025-03-13 ENCOUNTER — RX RENEWAL (OUTPATIENT)
Age: 85
End: 2025-03-13

## 2025-03-17 ENCOUNTER — OFFICE (OUTPATIENT)
Dept: URBAN - METROPOLITAN AREA CLINIC 27 | Facility: CLINIC | Age: 85
Setting detail: OPHTHALMOLOGY
End: 2025-03-17
Payer: MEDICARE

## 2025-03-17 DIAGNOSIS — H53.19: ICD-10-CM

## 2025-03-17 DIAGNOSIS — H16.223: ICD-10-CM

## 2025-03-17 DIAGNOSIS — H18.593: ICD-10-CM

## 2025-03-17 DIAGNOSIS — H57.13: ICD-10-CM

## 2025-03-17 PROCEDURE — 99213 OFFICE O/P EST LOW 20 MIN: CPT | Performed by: OPHTHALMOLOGY

## 2025-03-17 ASSESSMENT — REFRACTION_CURRENTRX
OD_AXIS: 93
OS_SPHERE: -2.50
OS_CYLINDER: +0.75
OS_SPHERE: -3.00
OD_OVR_VA: 20/
OD_SPHERE: -3.25
OD_AXIS: 180
OS_AXIS: 098
OD_OVR_VA: 20/
OS_OVR_VA: 20/
OD_SPHERE: -2.75
OS_CYLINDER: +0.75
OS_CYLINDER: -0.75
OD_SPHERE: -2.25
OS_OVR_VA: 20/
OS_AXIS: 174
OS_AXIS: 001
OS_SPHERE: -1.75
OD_CYLINDER: -0.50
OD_OVR_VA: 20/
OS_OVR_VA: 20/
OD_CYLINDER: +0.25
OD_CYLINDER: +0.50
OD_AXIS: 49

## 2025-03-17 ASSESSMENT — REFRACTION_AUTOREFRACTION
OS_CYLINDER: +1.00
OD_AXIS: 33
OS_AXIS: 123
OD_CYLINDER: +0.75
OS_SPHERE: -2.50
OD_SPHERE: -3.00

## 2025-03-17 ASSESSMENT — CONFRONTATIONAL VISUAL FIELD TEST (CVF)
OS_FINDINGS: FULL
OD_FINDINGS: FULL

## 2025-03-17 ASSESSMENT — KERATOMETRY
OD_K1POWER_DIOPTERS: 44.00
OS_K1POWER_DIOPTERS: 44.25
METHOD_AUTO_MANUAL: AUTO
OS_AXISANGLE_DEGREES: 122
OD_K2POWER_DIOPTERS: 45.00
OD_AXISANGLE_DEGREES: 99
OS_K2POWER_DIOPTERS: 45.25

## 2025-03-17 ASSESSMENT — TONOMETRY
OS_IOP_MMHG: 15
OD_IOP_MMHG: 15

## 2025-03-17 ASSESSMENT — VISUAL ACUITY
OD_BCVA: 20/30-1
OS_BCVA: 20/30

## 2025-03-17 ASSESSMENT — REFRACTION_MANIFEST
OD_VA1: 20/25+
OS_VA1: 20/25+-
OS_SPHERE: -3.00
OD_CYLINDER: -0.25
OD_SPHERE: -3.00
OS_AXIS: 005
OD_AXIS: 160
OS_CYLINDER: -0.75

## 2025-03-17 ASSESSMENT — SUPERFICIAL PUNCTATE KERATITIS (SPK)
OS_SPK: T
OD_SPK: T

## 2025-04-03 ENCOUNTER — APPOINTMENT (OUTPATIENT)
Dept: CARDIOLOGY | Facility: CLINIC | Age: 85
End: 2025-04-03
Payer: MEDICARE

## 2025-04-03 ENCOUNTER — NON-APPOINTMENT (OUTPATIENT)
Age: 85
End: 2025-04-03

## 2025-04-03 ENCOUNTER — APPOINTMENT (OUTPATIENT)
Dept: CARDIOLOGY | Facility: CLINIC | Age: 85
End: 2025-04-03

## 2025-04-03 ENCOUNTER — APPOINTMENT (OUTPATIENT)
Dept: PULMONOLOGY | Facility: CLINIC | Age: 85
End: 2025-04-03
Payer: MEDICARE

## 2025-04-03 VITALS
HEART RATE: 80 BPM | OXYGEN SATURATION: 96 % | HEIGHT: 68 IN | WEIGHT: 155 LBS | DIASTOLIC BLOOD PRESSURE: 80 MMHG | BODY MASS INDEX: 23.49 KG/M2 | SYSTOLIC BLOOD PRESSURE: 122 MMHG | RESPIRATION RATE: 16 BRPM

## 2025-04-03 VITALS
SYSTOLIC BLOOD PRESSURE: 126 MMHG | TEMPERATURE: 97.2 F | HEART RATE: 64 BPM | HEIGHT: 68 IN | BODY MASS INDEX: 23.04 KG/M2 | DIASTOLIC BLOOD PRESSURE: 78 MMHG | RESPIRATION RATE: 16 BRPM | OXYGEN SATURATION: 100 % | WEIGHT: 152 LBS

## 2025-04-03 DIAGNOSIS — I34.1 NONRHEUMATIC MITRAL (VALVE) PROLAPSE: ICD-10-CM

## 2025-04-03 DIAGNOSIS — L02.32 FURUNCLE OF BUTTOCK: ICD-10-CM

## 2025-04-03 DIAGNOSIS — I27.21 SECONDARY PULMONARY ARTERIAL HYPERTENSION: ICD-10-CM

## 2025-04-03 DIAGNOSIS — J45.20 MILD INTERMITTENT ASTHMA, UNCOMPLICATED: ICD-10-CM

## 2025-04-03 DIAGNOSIS — I34.0 NONRHEUMATIC MITRAL (VALVE) INSUFFICIENCY: ICD-10-CM

## 2025-04-03 DIAGNOSIS — G47.33 OBSTRUCTIVE SLEEP APNEA (ADULT) (PEDIATRIC): ICD-10-CM

## 2025-04-03 DIAGNOSIS — E78.00 PURE HYPERCHOLESTEROLEMIA, UNSPECIFIED: ICD-10-CM

## 2025-04-03 DIAGNOSIS — I10 ESSENTIAL (PRIMARY) HYPERTENSION: ICD-10-CM

## 2025-04-03 DIAGNOSIS — R06.02 SHORTNESS OF BREATH: ICD-10-CM

## 2025-04-03 DIAGNOSIS — K21.9 GASTRO-ESOPHAGEAL REFLUX DISEASE W/OUT ESOPHAGITIS: ICD-10-CM

## 2025-04-03 PROCEDURE — ZZZZZ: CPT

## 2025-04-03 PROCEDURE — 99214 OFFICE O/P EST MOD 30 MIN: CPT | Mod: 25

## 2025-04-03 PROCEDURE — 94010 BREATHING CAPACITY TEST: CPT

## 2025-04-03 PROCEDURE — 99214 OFFICE O/P EST MOD 30 MIN: CPT

## 2025-04-03 PROCEDURE — 95012 NITRIC OXIDE EXP GAS DETER: CPT

## 2025-04-03 PROCEDURE — 93306 TTE W/DOPPLER COMPLETE: CPT

## 2025-04-03 PROCEDURE — G2211 COMPLEX E/M VISIT ADD ON: CPT

## 2025-04-03 PROCEDURE — 94727 GAS DIL/WSHOT DETER LNG VOL: CPT

## 2025-04-03 PROCEDURE — 94729 DIFFUSING CAPACITY: CPT

## 2025-04-03 PROCEDURE — 93000 ELECTROCARDIOGRAM COMPLETE: CPT

## 2025-04-09 RX ORDER — SOLIFENACIN SUCCINATE 5 MG/1
5 TABLET ORAL
Refills: 0 | Status: ACTIVE | COMMUNITY
Start: 2025-04-09

## 2025-04-20 ENCOUNTER — EMERGENCY (EMERGENCY)
Facility: HOSPITAL | Age: 85
LOS: 1 days | End: 2025-04-20
Attending: STUDENT IN AN ORGANIZED HEALTH CARE EDUCATION/TRAINING PROGRAM
Payer: MEDICARE

## 2025-04-20 VITALS
TEMPERATURE: 97 F | DIASTOLIC BLOOD PRESSURE: 90 MMHG | HEART RATE: 98 BPM | OXYGEN SATURATION: 98 % | HEIGHT: 68 IN | WEIGHT: 154.98 LBS | SYSTOLIC BLOOD PRESSURE: 157 MMHG | RESPIRATION RATE: 20 BRPM

## 2025-04-20 VITALS
DIASTOLIC BLOOD PRESSURE: 87 MMHG | RESPIRATION RATE: 18 BRPM | HEART RATE: 81 BPM | TEMPERATURE: 98 F | SYSTOLIC BLOOD PRESSURE: 132 MMHG | OXYGEN SATURATION: 98 %

## 2025-04-20 DIAGNOSIS — Z87.828 PERSONAL HISTORY OF OTHER (HEALED) PHYSICAL INJURY AND TRAUMA: Chronic | ICD-10-CM

## 2025-04-20 DIAGNOSIS — H26.9 UNSPECIFIED CATARACT: Chronic | ICD-10-CM

## 2025-04-20 DIAGNOSIS — Z98.890 OTHER SPECIFIED POSTPROCEDURAL STATES: Chronic | ICD-10-CM

## 2025-04-20 LAB
ALBUMIN SERPL ELPH-MCNC: 3.9 G/DL — SIGNIFICANT CHANGE UP (ref 3.3–5)
ALP SERPL-CCNC: 87 U/L — SIGNIFICANT CHANGE UP (ref 40–120)
ALT FLD-CCNC: 13 U/L — SIGNIFICANT CHANGE UP (ref 10–45)
ANION GAP SERPL CALC-SCNC: 13 MMOL/L — SIGNIFICANT CHANGE UP (ref 5–17)
AST SERPL-CCNC: 13 U/L — SIGNIFICANT CHANGE UP (ref 10–40)
BASOPHILS # BLD AUTO: 0.11 K/UL — SIGNIFICANT CHANGE UP (ref 0–0.2)
BASOPHILS NFR BLD AUTO: 1.3 % — SIGNIFICANT CHANGE UP (ref 0–2)
BILIRUB SERPL-MCNC: 0.2 MG/DL — SIGNIFICANT CHANGE UP (ref 0.2–1.2)
BUN SERPL-MCNC: 17 MG/DL — SIGNIFICANT CHANGE UP (ref 7–23)
CALCIUM SERPL-MCNC: 9.4 MG/DL — SIGNIFICANT CHANGE UP (ref 8.4–10.5)
CHLORIDE SERPL-SCNC: 106 MMOL/L — SIGNIFICANT CHANGE UP (ref 96–108)
CO2 SERPL-SCNC: 23 MMOL/L — SIGNIFICANT CHANGE UP (ref 22–31)
CREAT SERPL-MCNC: 1.24 MG/DL — SIGNIFICANT CHANGE UP (ref 0.5–1.3)
EGFR: 58 ML/MIN/1.73M2 — LOW
EGFR: 58 ML/MIN/1.73M2 — LOW
EOSINOPHIL # BLD AUTO: 0.06 K/UL — SIGNIFICANT CHANGE UP (ref 0–0.5)
EOSINOPHIL NFR BLD AUTO: 0.7 % — SIGNIFICANT CHANGE UP (ref 0–6)
GLUCOSE SERPL-MCNC: 122 MG/DL — HIGH (ref 70–99)
HCT VFR BLD CALC: 46.7 % — SIGNIFICANT CHANGE UP (ref 39–50)
HGB BLD-MCNC: 15.4 G/DL — SIGNIFICANT CHANGE UP (ref 13–17)
IMM GRANULOCYTES NFR BLD AUTO: 0.6 % — SIGNIFICANT CHANGE UP (ref 0–0.9)
LIDOCAIN IGE QN: 28 U/L — SIGNIFICANT CHANGE UP (ref 7–60)
LYMPHOCYTES # BLD AUTO: 1.46 K/UL — SIGNIFICANT CHANGE UP (ref 1–3.3)
LYMPHOCYTES # BLD AUTO: 17.7 % — SIGNIFICANT CHANGE UP (ref 13–44)
MCHC RBC-ENTMCNC: 30 PG — SIGNIFICANT CHANGE UP (ref 27–34)
MCHC RBC-ENTMCNC: 33 G/DL — SIGNIFICANT CHANGE UP (ref 32–36)
MCV RBC AUTO: 91 FL — SIGNIFICANT CHANGE UP (ref 80–100)
MONOCYTES # BLD AUTO: 0.73 K/UL — SIGNIFICANT CHANGE UP (ref 0–0.9)
MONOCYTES NFR BLD AUTO: 8.8 % — SIGNIFICANT CHANGE UP (ref 2–14)
NEUTROPHILS # BLD AUTO: 5.85 K/UL — SIGNIFICANT CHANGE UP (ref 1.8–7.4)
NEUTROPHILS NFR BLD AUTO: 70.9 % — SIGNIFICANT CHANGE UP (ref 43–77)
NRBC BLD AUTO-RTO: 0 /100 WBCS — SIGNIFICANT CHANGE UP (ref 0–0)
NT-PROBNP SERPL-SCNC: 109 PG/ML — SIGNIFICANT CHANGE UP (ref 0–300)
PLATELET # BLD AUTO: 188 K/UL — SIGNIFICANT CHANGE UP (ref 150–400)
POTASSIUM SERPL-MCNC: 4.2 MMOL/L — SIGNIFICANT CHANGE UP (ref 3.5–5.3)
POTASSIUM SERPL-SCNC: 4.2 MMOL/L — SIGNIFICANT CHANGE UP (ref 3.5–5.3)
PROT SERPL-MCNC: 6.7 G/DL — SIGNIFICANT CHANGE UP (ref 6–8.3)
RBC # BLD: 5.13 M/UL — SIGNIFICANT CHANGE UP (ref 4.2–5.8)
RBC # FLD: 13.2 % — SIGNIFICANT CHANGE UP (ref 10.3–14.5)
SODIUM SERPL-SCNC: 142 MMOL/L — SIGNIFICANT CHANGE UP (ref 135–145)
TROPONIN T, HIGH SENSITIVITY RESULT: 9 NG/L — SIGNIFICANT CHANGE UP (ref 0–51)
WBC # BLD: 8.26 K/UL — SIGNIFICANT CHANGE UP (ref 3.8–10.5)
WBC # FLD AUTO: 8.26 K/UL — SIGNIFICANT CHANGE UP (ref 3.8–10.5)

## 2025-04-20 PROCEDURE — 84484 ASSAY OF TROPONIN QUANT: CPT

## 2025-04-20 PROCEDURE — 83880 ASSAY OF NATRIURETIC PEPTIDE: CPT

## 2025-04-20 PROCEDURE — 85025 COMPLETE CBC W/AUTO DIFF WBC: CPT

## 2025-04-20 PROCEDURE — 71046 X-RAY EXAM CHEST 2 VIEWS: CPT

## 2025-04-20 PROCEDURE — 93010 ELECTROCARDIOGRAM REPORT: CPT

## 2025-04-20 PROCEDURE — 83690 ASSAY OF LIPASE: CPT

## 2025-04-20 PROCEDURE — 96374 THER/PROPH/DIAG INJ IV PUSH: CPT

## 2025-04-20 PROCEDURE — 80053 COMPREHEN METABOLIC PANEL: CPT

## 2025-04-20 PROCEDURE — 71046 X-RAY EXAM CHEST 2 VIEWS: CPT | Mod: 26

## 2025-04-20 PROCEDURE — 93005 ELECTROCARDIOGRAM TRACING: CPT

## 2025-04-20 PROCEDURE — 99285 EMERGENCY DEPT VISIT HI MDM: CPT | Mod: FS

## 2025-04-20 PROCEDURE — 99285 EMERGENCY DEPT VISIT HI MDM: CPT | Mod: 25

## 2025-04-20 RX ORDER — KETOROLAC TROMETHAMINE 30 MG/ML
15 INJECTION, SOLUTION INTRAMUSCULAR; INTRAVENOUS ONCE
Refills: 0 | Status: DISCONTINUED | OUTPATIENT
Start: 2025-04-20 | End: 2025-04-20

## 2025-04-20 RX ADMIN — KETOROLAC TROMETHAMINE 15 MILLIGRAM(S): 30 INJECTION, SOLUTION INTRAMUSCULAR; INTRAVENOUS at 14:24

## 2025-04-20 NOTE — ED PROVIDER NOTE - OBJECTIVE STATEMENT
84-year-old male hypertension hyperlipidemia hypothyroidism presenting to the emergency department with 10 days of bilateral shoulder neck and chest discomfort.  Patient says symptoms started the day after he followed up with his cardiologist and had an echocardiogram.  During the echocardiogram says that he was positioned awkwardly and thinks that the pain might be related to that.  Pain is worse with movement has not taken anything for the pain nothing makes it better.  No shortness of breath no palpitations no belly pain nausea vomiting diarrhea.  Patient does workout frequently and says that he can reproduce the pain when he was lifting when the pressure

## 2025-04-20 NOTE — ED PROVIDER NOTE - PROGRESS NOTE DETAILS
Patient reporting near complete improvement of his shoulder pain after receiving a dose of Toradol.  Blood work unremarkable EKG showing no acute changes with a normal chest x-ray, patient's pain likely related to musculoskeletal will continue NSAIDs and follow-up closely with his primary care physician discussed all return precautions.  Melody

## 2025-04-20 NOTE — ED PROVIDER NOTE - CLINICAL SUMMARY MEDICAL DECISION MAKING FREE TEXT BOX
Larissa Collado MD (Attending MD): 81-year-old male presenting with bilateral shoulder pain timesx 10 days.  Had nonactionable cardiac workup done prior to pain starting.  Presents complaining of her dizziness. Larissa Collado MD (Attending MD): 81-year-old male presenting with bilateral shoulder pain timesx 10 days.  Had nonactionable cardiac workup done prior to pain starting.  no other associated sx. lower c/f acs but will send trop. likely rotator cuff-related pain.

## 2025-04-20 NOTE — ED PROVIDER NOTE - ATTENDING APP SHARED VISIT CONTRIBUTION OF CARE
Attending MD Collado: I personally made/approved the management plan and take responsibility for the patient management.    see mdm       *The above represents an initial assessment/impression. Please refer to progress notes for potential changes in patient clinical course*

## 2025-04-20 NOTE — ED PROVIDER NOTE - NSFOLLOWUPINSTRUCTIONS_ED_ALL_ED_FT
Ibuprofen/Tylenol as needed for pain consult package insert for dosing information  Follow-up with your doctor or medicine clinic  If you have any worsening pain or any new or worsening complaints please contact the emergency department immediately

## 2025-04-20 NOTE — ED ADULT TRIAGE NOTE - CHIEF COMPLAINT QUOTE
pt with b/l shoulder pain for 1 1/2 weeks approx onset 4/4 feels upper chest tightness pt does state he lifts weights and does stretches pt took ibupropen for the 1st time today for this discomfort pain increases with ROM and is releived when laying down pain worse in the morning

## 2025-04-20 NOTE — ED PROVIDER NOTE - NSFOLLOWUPCLINICS_GEN_ALL_ED_FT
[facility] General Internal Medicine  General Internal Medicine  [address] [place] [address]  Phone: [phone]  Fax:

## 2025-04-20 NOTE — ED PROVIDER NOTE - CARE PLAN
Principal Discharge DX:	Neck pain   1 Split-Thickness Skin Graft Text: The defect edges were debeveled with a #15 scalpel blade.  Given the location of the defect, shape of the defect and the proximity to free margins a split thickness skin graft was deemed most appropriate.  Using a sterile surgical marker, the primary defect shape was transferred to the donor site. The split thickness graft was then harvested.  The skin graft was then placed in the primary defect and oriented appropriately.

## 2025-04-20 NOTE — ED ADULT NURSE NOTE - OBJECTIVE STATEMENT
84y Male PMHx HLD and hypothyroidism presenting to ED via walk-in triage for b/l shoulder, neck and chest discomfort x 10d. Pt states that symptoms started the day after the had an echo and states during his test he was positioned uncomfortably and woke up sore. Pt endorsing worsening pain with movement, pt states he took 1 200mg motrin w/o relief. Pt denies SOB, palpitations, abd pain, nausea, vomiting, and diarrhea. Pt does workout frequently and states he has had pain with lifting and while using his phone if his arms are up while laying down. IV placed, labs drawn and sent, medicated as ordered, seen and eval by MD. Indigo in lowest position and locked, VSS.

## 2025-04-21 ENCOUNTER — OUTPATIENT (OUTPATIENT)
Dept: OUTPATIENT SERVICES | Facility: HOSPITAL | Age: 85
LOS: 1 days | End: 2025-04-21
Payer: MEDICARE

## 2025-04-21 DIAGNOSIS — H26.9 UNSPECIFIED CATARACT: Chronic | ICD-10-CM

## 2025-04-21 DIAGNOSIS — Z87.828 PERSONAL HISTORY OF OTHER (HEALED) PHYSICAL INJURY AND TRAUMA: Chronic | ICD-10-CM

## 2025-04-21 DIAGNOSIS — Z98.890 OTHER SPECIFIED POSTPROCEDURAL STATES: Chronic | ICD-10-CM

## 2025-04-21 DIAGNOSIS — I35.0 NONRHEUMATIC AORTIC (VALVE) STENOSIS: ICD-10-CM

## 2025-04-22 ENCOUNTER — RESULT REVIEW (OUTPATIENT)
Age: 85
End: 2025-04-22

## 2025-04-22 PROCEDURE — 93356 MYOCRD STRAIN IMG SPCKL TRCK: CPT

## 2025-04-22 PROCEDURE — 76377 3D RENDER W/INTRP POSTPROCES: CPT | Mod: 26

## 2025-04-22 PROCEDURE — 93306 TTE W/DOPPLER COMPLETE: CPT

## 2025-04-22 PROCEDURE — 93306 TTE W/DOPPLER COMPLETE: CPT | Mod: 26

## 2025-04-22 PROCEDURE — 76377 3D RENDER W/INTRP POSTPROCES: CPT

## 2025-04-24 ENCOUNTER — APPOINTMENT (OUTPATIENT)
Dept: CARDIOTHORACIC SURGERY | Facility: CLINIC | Age: 85
End: 2025-04-24
Payer: MEDICARE

## 2025-04-24 VITALS
RESPIRATION RATE: 16 BRPM | DIASTOLIC BLOOD PRESSURE: 91 MMHG | SYSTOLIC BLOOD PRESSURE: 142 MMHG | HEART RATE: 91 BPM | TEMPERATURE: 98.2 F | OXYGEN SATURATION: 95 %

## 2025-04-24 DIAGNOSIS — R06.02 SHORTNESS OF BREATH: ICD-10-CM

## 2025-04-24 DIAGNOSIS — I27.21 SECONDARY PULMONARY ARTERIAL HYPERTENSION: ICD-10-CM

## 2025-04-24 PROCEDURE — 99205 OFFICE O/P NEW HI 60 MIN: CPT

## 2025-04-24 PROCEDURE — G2211 COMPLEX E/M VISIT ADD ON: CPT

## 2025-05-01 ENCOUNTER — OFFICE (OUTPATIENT)
Facility: LOCATION | Age: 85
Setting detail: OPHTHALMOLOGY
End: 2025-05-01
Payer: MEDICARE

## 2025-05-01 DIAGNOSIS — H35.033: ICD-10-CM

## 2025-05-01 DIAGNOSIS — H53.433: ICD-10-CM

## 2025-05-01 DIAGNOSIS — H53.19: ICD-10-CM

## 2025-05-01 DIAGNOSIS — H16.223: ICD-10-CM

## 2025-05-01 PROCEDURE — 92014 COMPRE OPH EXAM EST PT 1/>: CPT | Performed by: OPHTHALMOLOGY

## 2025-05-01 PROCEDURE — 92083 EXTENDED VISUAL FIELD XM: CPT | Performed by: OPHTHALMOLOGY

## 2025-05-01 PROCEDURE — 92201 OPSCPY EXTND RTA DRAW UNI/BI: CPT | Performed by: OPHTHALMOLOGY

## 2025-05-01 ASSESSMENT — CONFRONTATIONAL VISUAL FIELD TEST (CVF)
OS_FINDINGS: FULL
OD_FINDINGS: FULL

## 2025-05-01 ASSESSMENT — SUPERFICIAL PUNCTATE KERATITIS (SPK)
OD_SPK: T
OS_SPK: T

## 2025-05-05 ENCOUNTER — TRANSCRIPTION ENCOUNTER (OUTPATIENT)
Age: 85
End: 2025-05-05

## 2025-05-05 ENCOUNTER — OUTPATIENT (OUTPATIENT)
Dept: OUTPATIENT SERVICES | Facility: HOSPITAL | Age: 85
LOS: 1 days | End: 2025-05-05
Payer: MEDICARE

## 2025-05-05 VITALS
OXYGEN SATURATION: 98 % | HEART RATE: 72 BPM | SYSTOLIC BLOOD PRESSURE: 116 MMHG | DIASTOLIC BLOOD PRESSURE: 70 MMHG | RESPIRATION RATE: 16 BRPM

## 2025-05-05 VITALS — HEIGHT: 68 IN | WEIGHT: 154.98 LBS

## 2025-05-05 DIAGNOSIS — H26.9 UNSPECIFIED CATARACT: Chronic | ICD-10-CM

## 2025-05-05 DIAGNOSIS — I27.20 PULMONARY HYPERTENSION, UNSPECIFIED: ICD-10-CM

## 2025-05-05 DIAGNOSIS — Z87.828 PERSONAL HISTORY OF OTHER (HEALED) PHYSICAL INJURY AND TRAUMA: Chronic | ICD-10-CM

## 2025-05-05 DIAGNOSIS — Z98.890 OTHER SPECIFIED POSTPROCEDURAL STATES: Chronic | ICD-10-CM

## 2025-05-05 LAB
ANION GAP SERPL CALC-SCNC: 12 MMOL/L — SIGNIFICANT CHANGE UP (ref 5–17)
BUN SERPL-MCNC: 14 MG/DL — SIGNIFICANT CHANGE UP (ref 7–23)
CALCIUM SERPL-MCNC: 9.5 MG/DL — SIGNIFICANT CHANGE UP (ref 8.4–10.5)
CHLORIDE SERPL-SCNC: 103 MMOL/L — SIGNIFICANT CHANGE UP (ref 96–108)
CO2 SERPL-SCNC: 24 MMOL/L — SIGNIFICANT CHANGE UP (ref 22–31)
CREAT SERPL-MCNC: 1.11 MG/DL — SIGNIFICANT CHANGE UP (ref 0.5–1.3)
EGFR: 66 ML/MIN/1.73M2 — SIGNIFICANT CHANGE UP
EGFR: 66 ML/MIN/1.73M2 — SIGNIFICANT CHANGE UP
GLUCOSE SERPL-MCNC: 98 MG/DL — SIGNIFICANT CHANGE UP (ref 70–99)
HCT VFR BLD CALC: 50.6 % — HIGH (ref 39–50)
HGB BLD-MCNC: 16 G/DL — SIGNIFICANT CHANGE UP (ref 13–17)
HGB FLD-MCNC: 15 G/DL — SIGNIFICANT CHANGE UP (ref 12.6–17.4)
MCHC RBC-ENTMCNC: 29.4 PG — SIGNIFICANT CHANGE UP (ref 27–34)
MCHC RBC-ENTMCNC: 31.6 G/DL — LOW (ref 32–36)
MCV RBC AUTO: 93 FL — SIGNIFICANT CHANGE UP (ref 80–100)
NRBC BLD AUTO-RTO: 0 /100 WBCS — SIGNIFICANT CHANGE UP (ref 0–0)
OXYHGB MFR BLDMV: 73.3 % — SIGNIFICANT CHANGE UP
PLATELET # BLD AUTO: 199 K/UL — SIGNIFICANT CHANGE UP (ref 150–400)
POTASSIUM SERPL-MCNC: 3.8 MMOL/L — SIGNIFICANT CHANGE UP (ref 3.5–5.3)
POTASSIUM SERPL-SCNC: 3.8 MMOL/L — SIGNIFICANT CHANGE UP (ref 3.5–5.3)
RBC # BLD: 5.44 M/UL — SIGNIFICANT CHANGE UP (ref 4.2–5.8)
RBC # FLD: 13.2 % — SIGNIFICANT CHANGE UP (ref 10.3–14.5)
SAO2 % BLD: 74.3 % — SIGNIFICANT CHANGE UP (ref 60–90)
SODIUM SERPL-SCNC: 139 MMOL/L — SIGNIFICANT CHANGE UP (ref 135–145)
WBC # BLD: 5.71 K/UL — SIGNIFICANT CHANGE UP (ref 3.8–10.5)
WBC # FLD AUTO: 5.71 K/UL — SIGNIFICANT CHANGE UP (ref 3.8–10.5)

## 2025-05-05 PROCEDURE — C1887: CPT

## 2025-05-05 PROCEDURE — 93005 ELECTROCARDIOGRAM TRACING: CPT | Mod: XU

## 2025-05-05 PROCEDURE — 85027 COMPLETE CBC AUTOMATED: CPT

## 2025-05-05 PROCEDURE — 80048 BASIC METABOLIC PNL TOTAL CA: CPT

## 2025-05-05 PROCEDURE — 93451 RIGHT HEART CATH: CPT

## 2025-05-05 PROCEDURE — 82803 BLOOD GASES ANY COMBINATION: CPT

## 2025-05-05 PROCEDURE — C1894: CPT

## 2025-05-05 PROCEDURE — 93451 RIGHT HEART CATH: CPT | Mod: 26

## 2025-05-05 PROCEDURE — 93010 ELECTROCARDIOGRAM REPORT: CPT

## 2025-05-05 PROCEDURE — 36415 COLL VENOUS BLD VENIPUNCTURE: CPT

## 2025-05-05 RX ORDER — ROSUVASTATIN CALCIUM 20 MG/1
1 TABLET, FILM COATED ORAL
Refills: 0 | DISCHARGE

## 2025-05-05 RX ORDER — CYCLOSPORINE OPHTHALMIC SOLUTION 1 MG/ML
1 SOLUTION/ DROPS OPHTHALMIC
Refills: 0 | DISCHARGE

## 2025-05-05 NOTE — ASU DISCHARGE PLAN (ADULT/PEDIATRIC) - FINANCIAL ASSISTANCE
[facility] provides services at a reduced cost to those who are determined to be eligible through [facility]’s financial assistance program. Information regarding [facility]’s financial assistance program can be found by going to [email or web address] or by calling [phone].

## 2025-05-05 NOTE — ASU PATIENT PROFILE, ADULT - FALL HARM RISK - UNIVERSAL INTERVENTIONS
Bed in lowest position, wheels locked, appropriate side rails in place/Call bell, personal items and telephone in reach/Instruct patient to call for assistance before getting out of bed or chair/Non-slip footwear when patient is out of bed/[place] to call system/Physically safe environment - no spills, clutter or unnecessary equipment/Purposeful Proactive Rounding/Room/bathroom lighting operational, light cord in reach

## 2025-05-05 NOTE — ASU DISCHARGE PLAN (ADULT/PEDIATRIC) - CARE PROVIDER_API CALL
Turner Sanchez [place]  Pulmonary Disease  [address]  [place] [address]  Phone: [phone]  Fax: [phone]  Established Patient  Follow Up Time: 2 weeks

## 2025-05-05 NOTE — H&P CARDIOLOGY - HISTORY OF PRESENT ILLNESS
84 year old male with PMHx of HTN, HLD, hypothyroidism, pulm HTN, mild SOHAIL (doesn't use CPAP), BPH, presented to Dr. Sanchez's office with c/o increase fatigue and dyspnea upon exertion over the last few weeks. He reports that his HR/BP have been elevated following a right shoulder injury. Recent echo on 4/3/24 demonstrated EF 64%, mild mitral/pulmonic regurgitation, mild to moderate tricuspid regurg, mild bileaflet mitral valve prolapse, mild pulm HTN. Patient now here for RHC w/ Dr. Hooper today. Currently denies chest pain, palpitations, SOB, dizziness.    Referring: Turner Sanchez    Echo 4/3/25:  CONCLUSIONS:  1.Left ventricular systolic function is normal with an ejection fraction of 64 % by visual interpretation visually estimated at 60 to 65 %.  2.Mild mitral regurgitation.  3.Mild pulmonic regurgitation.  4.Mild to moderate tricuspid regurgitation.  5.Estimated pulmonary artery systolic pressure is 46 mmHg, consistent with mild pulmonary hypertension.  6.There is mild thickening of the aortic valve leaflets.  7.Mild bileaflet mitral valve prolapse

## 2025-05-05 NOTE — ASU DISCHARGE PLAN (ADULT/PEDIATRIC) - NS MD DC FALL RISK RISK
Continues to follow with Dr. Carlton and sees her again in about one week.   For information on Fall & Injury Prevention, visit: [email or web address] OR  [email or web address] OR  https://www.cdc.gov/steadi/patient.html

## 2025-05-06 ENCOUNTER — APPOINTMENT (OUTPATIENT)
Dept: ORTHOPEDIC SURGERY | Facility: CLINIC | Age: 85
End: 2025-05-06

## 2025-05-06 VITALS — WEIGHT: 155 LBS | BODY MASS INDEX: 23.49 KG/M2 | HEIGHT: 68 IN

## 2025-05-06 PROCEDURE — 73030 X-RAY EXAM OF SHOULDER: CPT | Mod: LT

## 2025-05-06 PROCEDURE — 99213 OFFICE O/P EST LOW 20 MIN: CPT

## 2025-05-07 PROBLEM — I27.20 PULMONARY HYPERTENSION, UNSPECIFIED: Chronic | Status: ACTIVE | Noted: 2025-05-05

## 2025-05-07 PROBLEM — E78.5 HYPERLIPIDEMIA, UNSPECIFIED: Chronic | Status: ACTIVE | Noted: 2025-05-05

## 2025-05-08 ENCOUNTER — APPOINTMENT (OUTPATIENT)
Dept: ORTHOPEDIC SURGERY | Facility: CLINIC | Age: 85
End: 2025-05-08
Payer: MEDICARE

## 2025-05-08 DIAGNOSIS — M75.41 IMPINGEMENT SYNDROME OF RIGHT SHOULDER: ICD-10-CM

## 2025-05-08 DIAGNOSIS — M75.42 IMPINGEMENT SYNDROME OF RIGHT SHOULDER: ICD-10-CM

## 2025-05-08 PROCEDURE — 20610 DRAIN/INJ JOINT/BURSA W/O US: CPT | Mod: 50

## 2025-05-08 PROCEDURE — 99213 OFFICE O/P EST LOW 20 MIN: CPT | Mod: 25

## 2025-05-09 PROBLEM — M75.41 IMPINGEMENT SYNDROME OF BOTH SHOULDERS: Status: ACTIVE | Noted: 2025-05-09

## 2025-05-23 ASSESSMENT — VISUAL ACUITY
OD_BCVA: 20/20-2
OS_BCVA: 20/20-1

## 2025-05-23 ASSESSMENT — REFRACTION_MANIFEST
OD_AXIS: 160
OS_SPHERE: -3.00
OS_AXIS: 005
OD_CYLINDER: -0.25
OS_CYLINDER: -0.75
OS_VA1: 20/25+-
OD_VA1: 20/25+
OD_SPHERE: -3.00

## 2025-05-23 ASSESSMENT — REFRACTION_CURRENTRX
OS_SPHERE: -2.50
OD_AXIS: 180
OS_CYLINDER: -0.75
OS_AXIS: 089
OD_OVR_VA: 20/
OD_CYLINDER: +0.25
OS_AXIS: 174
OS_CYLINDER: +0.75
OS_SPHERE: -3.00
OS_CYLINDER: +0.75
OS_CYLINDER: -0.75
OD_CYLINDER: -0.50
OS_OVR_VA: 20/
OD_OVR_VA: 20/
OS_SPHERE: -1.75
OD_AXIS: 93
OS_AXIS: 098
OD_CYLINDER: -0.50
OD_SPHERE: -2.75
OD_SPHERE: -2.25
OS_AXIS: 001
OD_AXIS: 49
OD_CYLINDER: +0.50
OD_AXIS: 004
OD_OVR_VA: 20/
OS_OVR_VA: 20/
OD_SPHERE: -2.25
OD_SPHERE: -3.25
OS_OVR_VA: 20/
OS_SPHERE: -1.75

## 2025-05-23 ASSESSMENT — KERATOMETRY
OS_K1POWER_DIOPTERS: 44.00
OD_AXISANGLE_DEGREES: 069
OD_K1POWER_DIOPTERS: 44.00
OS_K2POWER_DIOPTERS: 45.25
OS_AXISANGLE_DEGREES: 135
OD_K2POWER_DIOPTERS: 44.75
METHOD_AUTO_MANUAL: AUTO

## 2025-05-23 ASSESSMENT — REFRACTION_AUTOREFRACTION
OD_CYLINDER: -1.25
OD_AXIS: 175
OS_CYLINDER: -1.25
OS_SPHERE: -1.75
OD_SPHERE: -2.25
OS_AXIS: 035

## 2025-06-22 ENCOUNTER — NON-APPOINTMENT (OUTPATIENT)
Age: 85
End: 2025-06-22

## 2025-06-24 NOTE — PATIENT PROFILE ADULT - DO YOU FEEL THREATENED BY OTHERS?
Per Jeffery:    These medications are safe with fluoxetine     Writer attempted to call pt. No answer, LM stating below. Advised to call back with any questions or concerns.    no

## 2025-06-26 ENCOUNTER — APPOINTMENT (OUTPATIENT)
Dept: ORTHOPEDIC SURGERY | Facility: CLINIC | Age: 85
End: 2025-06-26
Payer: MEDICARE

## 2025-06-26 PROBLEM — M75.21 RIGHT BICIPITAL TENOSYNOVITIS: Status: ACTIVE | Noted: 2025-06-26

## 2025-06-26 PROCEDURE — 76942 ECHO GUIDE FOR BIOPSY: CPT | Mod: RT

## 2025-06-26 PROCEDURE — 99214 OFFICE O/P EST MOD 30 MIN: CPT | Mod: 25

## 2025-06-26 PROCEDURE — 20550 NJX 1 TENDON SHEATH/LIGAMENT: CPT | Mod: RT

## (undated) DEVICE — SUCTION YANKAUER NO CONTROL VENT

## (undated) DEVICE — TUBING SUCTION CONN 6FT STERILE

## (undated) DEVICE — SYR LUER LOK 20CC

## (undated) DEVICE — SYR LUER LOK 50CC

## (undated) DEVICE — FOLEY HOLDER STATLOCK 2 WAY ADULT

## (undated) DEVICE — SYR LUER LOK 5CC